# Patient Record
Sex: FEMALE | Race: WHITE | NOT HISPANIC OR LATINO | Employment: OTHER | URBAN - NONMETROPOLITAN AREA
[De-identification: names, ages, dates, MRNs, and addresses within clinical notes are randomized per-mention and may not be internally consistent; named-entity substitution may affect disease eponyms.]

---

## 2018-08-01 ENCOUNTER — TRANSFERRED RECORDS (OUTPATIENT)
Dept: HEALTH INFORMATION MANAGEMENT | Facility: OTHER | Age: 53
End: 2018-08-01

## 2019-03-18 ENCOUNTER — OFFICE VISIT (OUTPATIENT)
Dept: INTERNAL MEDICINE | Facility: OTHER | Age: 54
End: 2019-03-18
Attending: INTERNAL MEDICINE
Payer: COMMERCIAL

## 2019-03-18 VITALS
TEMPERATURE: 97.8 F | HEIGHT: 68 IN | WEIGHT: 167.2 LBS | HEART RATE: 84 BPM | DIASTOLIC BLOOD PRESSURE: 78 MMHG | SYSTOLIC BLOOD PRESSURE: 152 MMHG | BODY MASS INDEX: 25.34 KG/M2 | RESPIRATION RATE: 16 BRPM

## 2019-03-18 DIAGNOSIS — R03.0 BORDERLINE HYPERTENSION: ICD-10-CM

## 2019-03-18 DIAGNOSIS — Z91.030 H/O BEE STING ALLERGY: ICD-10-CM

## 2019-03-18 DIAGNOSIS — M15.0 PRIMARY OSTEOARTHRITIS INVOLVING MULTIPLE JOINTS: Primary | ICD-10-CM

## 2019-03-18 PROCEDURE — 99203 OFFICE O/P NEW LOW 30 MIN: CPT | Performed by: INTERNAL MEDICINE

## 2019-03-18 PROCEDURE — G0463 HOSPITAL OUTPT CLINIC VISIT: HCPCS

## 2019-03-18 RX ORDER — LORATADINE 10 MG/1
10 TABLET ORAL DAILY PRN
COMMUNITY
Start: 2019-03-18 | End: 2020-10-19

## 2019-03-18 RX ORDER — EPINEPHRINE 0.3 MG/.3ML
0.3 INJECTION SUBCUTANEOUS PRN
Qty: 1 ML | Refills: 3 | Status: SHIPPED | OUTPATIENT
Start: 2019-03-18

## 2019-03-18 SDOH — HEALTH STABILITY: MENTAL HEALTH: HOW MANY STANDARD DRINKS CONTAINING ALCOHOL DO YOU HAVE ON A TYPICAL DAY?: 1 OR 2

## 2019-03-18 SDOH — HEALTH STABILITY: MENTAL HEALTH: HOW OFTEN DO YOU HAVE A DRINK CONTAINING ALCOHOL?: 2-3 TIMES A WEEK

## 2019-03-18 ASSESSMENT — ENCOUNTER SYMPTOMS
WOUND: 0
WHEEZING: 0
SHORTNESS OF BREATH: 0
RHINORRHEA: 1
DIZZINESS: 0
BACK PAIN: 1
EYES NEGATIVE: 1
SINUS PRESSURE: 1
TREMORS: 0
BRUISES/BLEEDS EASILY: 0
HEMATOLOGIC/LYMPHATIC NEGATIVE: 1
HEADACHES: 0
FACIAL ASYMMETRY: 0
COLOR CHANGE: 0
ARTHRALGIAS: 1
PALPITATIONS: 0
CHOKING: 0
JOINT SWELLING: 1
APNEA: 0
COUGH: 0
ADENOPATHY: 0
CHEST TIGHTNESS: 0

## 2019-03-18 ASSESSMENT — PATIENT HEALTH QUESTIONNAIRE - PHQ9: SUM OF ALL RESPONSES TO PHQ QUESTIONS 1-9: 0

## 2019-03-18 ASSESSMENT — MIFFLIN-ST. JEOR: SCORE: 1411.91

## 2019-03-18 NOTE — NURSING NOTE
"The patient is here today to be seen to establish care.  Emilia Lisa LPN on 3/18/2019 at 3:11 PM  Chief Complaint   Patient presents with     Establish Care       Initial /78 (BP Location: Right arm, Patient Position: Sitting, Cuff Size: Adult Regular)   Pulse 84   Temp 97.8  F (36.6  C) (Tympanic)   Resp 16   Ht 1.727 m (5' 8\")   Wt 75.8 kg (167 lb 3.2 oz)   Breastfeeding? No   BMI 25.42 kg/m   Estimated body mass index is 25.42 kg/m  as calculated from the following:    Height as of this encounter: 1.727 m (5' 8\").    Weight as of this encounter: 75.8 kg (167 lb 3.2 oz).  Medication Reconciliation: complete    Emilia Lisa LPN    "

## 2019-03-18 NOTE — PROGRESS NOTES
Chief Complaint:  This patient is here to establish care.    HPI: She comes in today to establish care.  She is new to the area within the last 6 months.  She moved up here from the [place] area to live with her boyfriend.  They live on [facility].  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    None of her old records are here so we spent some time today getting everything up-to-date and reviewing them.  She has a history significant for osteoarthritis.  This is to the point of causing her to be on a disability and she is considering getting a Social Security disability determination.  She has a history of peptic ulcer disease so she cannot take anti-inflammatory medications.  She has tried glucosamine in the past without any success.  She has been on some antidepressants including amitriptyline in the past without improvement.  She is really interested more natural and physical therapy type treatment to try to improve upon her symptomatology.    1 of her biggest problems is with her right second finger.  Apparently was injured and splinted incorrectly.  It is now deformed and it causes her some discomfort.  She has seen a hand surgeon in the [place] area and apparently there is nothing more that can be done for this.    Medications are reconciled.  She needs a refill on her EpiPen.  Other than that she just takes Claritin seasonally.  She used to be on hypertensive medications but no longer is.  Past medical history, past surgical history, family history and social histories are all reviewed and updated.    Past Medical History:   Diagnosis Date     Borderline hypertension      H/O bee sting allergy      Osteoarthritis      PUD (peptic ulcer disease)      Seasonal allergies        History reviewed. No pertinent surgical history.    Current Outpatient Medications   Medication Sig Dispense Refill     EPINEPHrine (EPIPEN/ADRENACLICK/OR ANY BX GENERIC EQUIV) 0.3 MG/0.3ML  injection 2-pack Inject 0.3 mLs (0.3 mg) into the muscle as needed for anaphylaxis 1 mL 3     loratadine (CLARITIN) 10 MG tablet Take 1 tablet (10 mg) by mouth daily as needed for allergies         Allergies   Allergen Reactions     Bee Venom Swelling     Nsaids Other (See Comments)     Bleeding ulcers, tar stools       Family History   Problem Relation Age of Onset     Alzheimer Disease Mother      Other - See Comments Father         Old age 93     Alzheimer Disease Father      Pancreatic Cancer Sister      Other - See Comments Brother         Heroin OD       Social History     Socioeconomic History     Marital status: Single     Spouse name: Not on file     Number of children: Not on file     Years of education: Not on file     Highest education level: Not on file   Occupational History     Not on file   Social Needs     Financial resource strain: Not on file     Food insecurity:     Worry: Not on file     Inability: Not on file     Transportation needs:     Medical: Not on file     Non-medical: Not on file   Tobacco Use     Smoking status: Former Smoker     Packs/day: 0.50     Years: 20.00     Pack years: 10.00     Types: Cigarettes     Last attempt to quit: 3/18/2018     Years since quittin.0     Smokeless tobacco: Never Used   Substance and Sexual Activity     Alcohol use: Yes     Frequency: 2-3 times a week     Drinks per session: 1 or 2     Drug use: No     Sexual activity: Yes     Partners: Male   Lifestyle     Physical activity:     Days per week: Not on file     Minutes per session: Not on file     Stress: Not on file   Relationships     Social connections:     Talks on phone: Not on file     Gets together: Not on file     Attends Muslim service: Not on file     Active member of club or organization: Not on file     Attends meetings of clubs or organizations: Not on file     Relationship status: Not on file     Intimate partner violence:     Fear of current or ex partner: Not on file     Emotionally  abused: Not on file     Physically abused: Not on file     Forced sexual activity: Not on file   Other Topics Concern     Not on file   Social History Narrative    Single, but lives with SO on [facility].  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       Review of Systems   HENT: Positive for rhinorrhea, sinus pressure and sneezing.    Eyes: Negative.    Respiratory: Negative for apnea, cough, choking, chest tightness, shortness of breath and wheezing.    Cardiovascular: Negative for chest pain, palpitations and leg swelling.   Gastrointestinal:        History of irritable bowel   Musculoskeletal: Positive for arthralgias, back pain and joint swelling.   Skin: Negative for color change, pallor, rash and wound.   Allergic/Immunologic: Positive for environmental allergies.   Neurological: Negative for dizziness, tremors, facial asymmetry and headaches.   Hematological: Negative.  Negative for adenopathy. Does not bruise/bleed easily.       Physical Exam   Constitutional: She is oriented to person, place, and time. She appears well-developed and well-nourished. No distress.   HENT:   Head: Normocephalic.   Eyes: Conjunctivae are normal. Pupils are equal, round, and reactive to light.   Musculoskeletal:   Her hands are somewhat cool, question Raynaud's phenomena.  She does have a deformity of her right second finger with flexion.  She has some hypertrophic skin over her DIPs and PIPs.  No joint deformities otherwise seen.  No current synovitis present.   Neurological: She is alert and oriented to person, place, and time. No cranial nerve deficit. Coordination normal.   Skin: Skin is warm and dry. No rash noted. She is not diaphoretic. No erythema. No pallor.   Psychiatric: She has a normal mood and affect. Her behavior is normal.   Nursing note and vitals reviewed.      Assessment:      ICD-10-CM    1. Primary osteoarthritis involving multiple joints M15.0 PHYSICAL THERAPY REFERRAL   2. H/O bee sting allergy  Z91.030 EPINEPHrine (EPIPEN/ADRENACLICK/OR ANY BX GENERIC EQUIV) 0.3 MG/0.3ML injection 2-pack   3. Borderline hypertension R03.0         Plan: This patient has significant osteoarthritis in her hands but also in other areas including her feet.  She is requesting physical therapy referral which definitely seems reasonable.  We will try to treat her nonpharmacologically.  A consultation with [facility] is requested.  Her old records are on their way so hopefully will be here within the next couple of weeks.  I will have this patient follow-up with me in 1 month to reassess how she did with the therapy and to start drilling down on what kind of diagnoses and treatments she has had in the past.  She will need a recheck on her blood pressure.  She is obviously going to need to be updated with health maintenance issues.  I will be interested to see whether any rheumatologic evaluation was done.  No medications were prescribed today, but her EpiPen was refilled.

## 2019-03-27 ENCOUNTER — TRANSFERRED RECORDS (OUTPATIENT)
Dept: HEALTH INFORMATION MANAGEMENT | Facility: OTHER | Age: 54
End: 2019-03-27

## 2019-11-08 ENCOUNTER — OFFICE VISIT (OUTPATIENT)
Dept: INTERNAL MEDICINE | Facility: OTHER | Age: 54
End: 2019-11-08
Attending: INTERNAL MEDICINE
Payer: COMMERCIAL

## 2019-11-08 ENCOUNTER — HOSPITAL ENCOUNTER (OUTPATIENT)
Dept: GENERAL RADIOLOGY | Facility: OTHER | Age: 54
Discharge: HOME | End: 2019-11-08
Attending: INTERNAL MEDICINE | Admitting: INTERNAL MEDICINE
Payer: COMMERCIAL

## 2019-11-08 VITALS
WEIGHT: 178.2 LBS | SYSTOLIC BLOOD PRESSURE: 134 MMHG | BODY MASS INDEX: 27.1 KG/M2 | DIASTOLIC BLOOD PRESSURE: 88 MMHG | HEART RATE: 88 BPM | RESPIRATION RATE: 18 BRPM | TEMPERATURE: 96.7 F

## 2019-11-08 DIAGNOSIS — Z00.00 HEALTH CARE MAINTENANCE: ICD-10-CM

## 2019-11-08 DIAGNOSIS — M19.90 INFLAMMATORY ARTHRITIS: ICD-10-CM

## 2019-11-08 DIAGNOSIS — M19.072 PRIMARY OSTEOARTHRITIS OF LEFT FOOT: ICD-10-CM

## 2019-11-08 DIAGNOSIS — R20.0 LEFT ARM NUMBNESS: Primary | ICD-10-CM

## 2019-11-08 DIAGNOSIS — Z12.31 VISIT FOR SCREENING MAMMOGRAM: ICD-10-CM

## 2019-11-08 LAB
ALBUMIN SERPL-MCNC: 5 G/DL (ref 3.5–5.7)
ALBUMIN UR-MCNC: NEGATIVE MG/DL
ALP SERPL-CCNC: 78 U/L (ref 34–104)
ALT SERPL W P-5'-P-CCNC: 17 U/L (ref 7–52)
ANION GAP SERPL CALCULATED.3IONS-SCNC: 9 MMOL/L (ref 3–14)
APPEARANCE UR: CLEAR
AST SERPL W P-5'-P-CCNC: 15 U/L (ref 13–39)
BACTERIA #/AREA URNS HPF: ABNORMAL /HPF
BILIRUB SERPL-MCNC: 0.8 MG/DL (ref 0.3–1)
BILIRUB UR QL STRIP: NEGATIVE
BUN SERPL-MCNC: 14 MG/DL (ref 7–25)
CALCIUM SERPL-MCNC: 10 MG/DL (ref 8.6–10.3)
CHLORIDE SERPL-SCNC: 100 MMOL/L (ref 98–107)
CHOLEST SERPL-MCNC: 222 MG/DL
CO2 SERPL-SCNC: 30 MMOL/L (ref 21–31)
COLOR UR AUTO: YELLOW
CREAT SERPL-MCNC: 0.81 MG/DL (ref 0.6–1.2)
CRP SERPL-MCNC: 0.2 MG/L
ERYTHROCYTE [DISTWIDTH] IN BLOOD BY AUTOMATED COUNT: 11.9 % (ref 10–15)
ERYTHROCYTE [SEDIMENTATION RATE] IN BLOOD BY WESTERGREN METHOD: 12 MM/H (ref 1–15)
GFR SERPL CREATININE-BSD FRML MDRD: 74 ML/MIN/{1.73_M2}
GLUCOSE SERPL-MCNC: 104 MG/DL (ref 70–105)
GLUCOSE UR STRIP-MCNC: NEGATIVE MG/DL
HCT VFR BLD AUTO: 45.7 % (ref 35–47)
HDLC SERPL-MCNC: 74 MG/DL (ref 23–92)
HGB BLD-MCNC: 15 G/DL (ref 11.7–15.7)
HGB UR QL STRIP: NEGATIVE
KETONES UR STRIP-MCNC: ABNORMAL MG/DL
LDLC SERPL CALC-MCNC: 130 MG/DL
LEUKOCYTE ESTERASE UR QL STRIP: ABNORMAL
MCH RBC QN AUTO: 30.5 PG (ref 26.5–33)
MCHC RBC AUTO-ENTMCNC: 32.8 G/DL (ref 31.5–36.5)
MCV RBC AUTO: 93 FL (ref 78–100)
NITRATE UR QL: NEGATIVE
NON-SQ EPI CELLS #/AREA URNS LPF: ABNORMAL /LPF
NONHDLC SERPL-MCNC: 148 MG/DL
PH UR STRIP: 5.5 PH (ref 5–9)
PLATELET # BLD AUTO: 352 10E9/L (ref 150–450)
POTASSIUM SERPL-SCNC: 4.3 MMOL/L (ref 3.5–5.1)
PROT SERPL-MCNC: 9.2 G/DL (ref 6.4–8.9)
RBC # BLD AUTO: 4.91 10E12/L (ref 3.8–5.2)
RBC #/AREA URNS AUTO: ABNORMAL /HPF
RHEUMATOID FACT SER NEPH-ACNC: <14 IU/ML (ref 0–20)
SODIUM SERPL-SCNC: 139 MMOL/L (ref 134–144)
SOURCE: ABNORMAL
SP GR UR STRIP: 1.02 (ref 1–1.03)
TRIGL SERPL-MCNC: 90 MG/DL
URATE SERPL-MCNC: 6.6 MG/DL (ref 4.4–7.6)
UROBILINOGEN UR STRIP-ACNC: 0.2 EU/DL (ref 0.2–1)
WBC # BLD AUTO: 11.7 10E9/L (ref 4–11)
WBC #/AREA URNS AUTO: ABNORMAL /HPF

## 2019-11-08 PROCEDURE — 80053 COMPREHEN METABOLIC PANEL: CPT | Mod: ZL | Performed by: INTERNAL MEDICINE

## 2019-11-08 PROCEDURE — 84550 ASSAY OF BLOOD/URIC ACID: CPT | Mod: ZL | Performed by: INTERNAL MEDICINE

## 2019-11-08 PROCEDURE — 86140 C-REACTIVE PROTEIN: CPT | Mod: ZL | Performed by: INTERNAL MEDICINE

## 2019-11-08 PROCEDURE — 86431 RHEUMATOID FACTOR QUANT: CPT | Mod: ZL | Performed by: INTERNAL MEDICINE

## 2019-11-08 PROCEDURE — 80061 LIPID PANEL: CPT | Mod: ZL | Performed by: INTERNAL MEDICINE

## 2019-11-08 PROCEDURE — 85027 COMPLETE CBC AUTOMATED: CPT | Mod: ZL | Performed by: INTERNAL MEDICINE

## 2019-11-08 PROCEDURE — 73630 X-RAY EXAM OF FOOT: CPT | Mod: LT

## 2019-11-08 PROCEDURE — 99214 OFFICE O/P EST MOD 30 MIN: CPT | Performed by: INTERNAL MEDICINE

## 2019-11-08 PROCEDURE — 85652 RBC SED RATE AUTOMATED: CPT | Mod: ZL | Performed by: INTERNAL MEDICINE

## 2019-11-08 PROCEDURE — G0463 HOSPITAL OUTPT CLINIC VISIT: HCPCS

## 2019-11-08 PROCEDURE — 86200 CCP ANTIBODY: CPT | Mod: ZL | Performed by: INTERNAL MEDICINE

## 2019-11-08 PROCEDURE — G0463 HOSPITAL OUTPT CLINIC VISIT: HCPCS | Mod: 25

## 2019-11-08 PROCEDURE — 36415 COLL VENOUS BLD VENIPUNCTURE: CPT | Mod: ZL | Performed by: INTERNAL MEDICINE

## 2019-11-08 PROCEDURE — 86618 LYME DISEASE ANTIBODY: CPT | Mod: ZL | Performed by: INTERNAL MEDICINE

## 2019-11-08 PROCEDURE — 81001 URINALYSIS AUTO W/SCOPE: CPT | Mod: ZL | Performed by: INTERNAL MEDICINE

## 2019-11-08 PROCEDURE — 86038 ANTINUCLEAR ANTIBODIES: CPT | Mod: ZL | Performed by: INTERNAL MEDICINE

## 2019-11-08 ASSESSMENT — ENCOUNTER SYMPTOMS
ALLERGIC/IMMUNOLOGIC NEGATIVE: 1
CONSTITUTIONAL NEGATIVE: 1
HEMATOLOGIC/LYMPHATIC NEGATIVE: 1
ENDOCRINE NEGATIVE: 1

## 2019-11-08 ASSESSMENT — PATIENT HEALTH QUESTIONNAIRE - PHQ9: SUM OF ALL RESPONSES TO PHQ QUESTIONS 1-9: 0

## 2019-11-08 NOTE — NURSING NOTE
"The patient is here today to have a follow up visit about recent vocational rehab.,  Emilia Lisa LPN on 11/8/2019 at 1:37 PM  Chief Complaint   Patient presents with     RECHECK       Initial There were no vitals taken for this visit. Estimated body mass index is 25.42 kg/m  as calculated from the following:    Height as of 3/18/19: 1.727 m (5' 8\").    Weight as of 3/18/19: 75.8 kg (167 lb 3.2 oz).  Medication Reconciliation: complete    Emilia Lisa LPN    "

## 2019-11-08 NOTE — PROGRESS NOTES
Chief Complaint:  Multiple concerns.    HPI: She is in today for a follow-up.  The only time that I saw her was approximately 7 months ago.  She has a lot of problems with arthritis.  She is talking about getting an SSI disability determination and evaluation.  We talked about that for some time today.  I recommend that she contact the SSI division so that she could be evaluated by an independent examiner to see whether or not she would qualify.    She has arthritis in various areas including her hands and her feet.  Her feet especially bother her.  She would like to have this checked.  We had also talked about doing lab work on her to evaluate for her arthritis to make sure she did not have a rheumatologic or infectious process.  She is fasting today and would like to have that done.    She has numbness of her left arm.  This has been bothering her for some time.  She has a little bit of weakness with it as well.  She is right-handed.  She would like to have this investigated further.    Past Medical History:   Diagnosis Date     Borderline hypertension      H/O bee sting allergy      Osteoarthritis      PUD (peptic ulcer disease)      Seasonal allergies        History reviewed. No pertinent surgical history.    Allergies   Allergen Reactions     Bee Venom Swelling     Nsaids Other (See Comments)     Bleeding ulcers, tar stools       Current Outpatient Medications   Medication Sig Dispense Refill     EPINEPHrine (EPIPEN/ADRENACLICK/OR ANY BX GENERIC EQUIV) 0.3 MG/0.3ML injection 2-pack Inject 0.3 mLs (0.3 mg) into the muscle as needed for anaphylaxis 1 mL 3     loratadine (CLARITIN) 10 MG tablet Take 1 tablet (10 mg) by mouth daily as needed for allergies         Social History     Socioeconomic History     Marital status: Single     Spouse name: Not on file     Number of children: Not on file     Years of education: Not on file     Highest education level: Not on file   Occupational History     Not on file    Social Needs     Financial resource strain: Not on file     Food insecurity:     Worry: Not on file     Inability: Not on file     Transportation needs:     Medical: Not on file     Non-medical: Not on file   Tobacco Use     Smoking status: Current Every Day Smoker     Packs/day: 0.50     Years: 20.00     Pack years: 10.00     Types: Cigarettes     Last attempt to quit: 3/18/2018     Years since quittin.6     Smokeless tobacco: Never Used   Substance and Sexual Activity     Alcohol use: Yes     Frequency: 2-3 times a week     Drinks per session: 1 or 2     Drug use: No     Sexual activity: Yes     Partners: Male   Lifestyle     Physical activity:     Days per week: Not on file     Minutes per session: Not on file     Stress: Not on file   Relationships     Social connections:     Talks on phone: Not on file     Gets together: Not on file     Attends Zoroastrianism service: Not on file     Active member of club or organization: Not on file     Attends meetings of clubs or organizations: Not on file     Relationship status: Not on file     Intimate partner violence:     Fear of current or ex partner: Not on file     Emotionally abused: Not on file     Physically abused: Not on file     Forced sexual activity: Not on file   Other Topics Concern     Not on file   Social History Narrative    Single, but lives with SO on [facility].  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       Review of Systems   Constitutional: Negative.    Endocrine: Negative.    Skin: Negative.    Allergic/Immunologic: Negative.    Hematological: Negative.        Physical Exam  Vitals signs and nursing note reviewed.   Constitutional:       General: She is not in acute distress.     Appearance: Normal appearance. She is not ill-appearing, toxic-appearing or diaphoretic.   Musculoskeletal:      Comments: She has what appears to be OA changes of her hands and her feet on exam today.  No definite synovitis or effusions are present.    Neurological:      Mental Status: She is alert.         Assessment:      ICD-10-CM    1. Left arm numbness R20.0 NEUROLOGY ADULT REFERRAL   2. Primary osteoarthritis of left foot M19.072 XR Foot Left G/E 3 Views   3. Inflammatory arthritis M19.90 Comprehensive Metabolic Panel     CBC W PLT No Diff     *UA reflex to Microscopic     Sedimentation Rate (ESR)     CRP inflammation     Uric acid     Rheumatoid factor     Cyclic Citrullinated Peptide Antibody IgG     Anti Nuclear Airam IgG by IFA with Reflex     Lyme Disease Ab with reflex to WB Serum   4. Health care maintenance Z00.00 Lipid Panel   5. Visit for screening mammogram Z12.31 MA Screen Bilateral w/Hayden       Plan: Her x-ray today showed osteoarthritis of the first MTP joint.  I expect that all of her symptoms are related to osteoarthritis rather than inflammatory arthritis but complete work-up is definitely warranted.  She will have complete blood and urine test today and I will send her a letter with the results.  No change in medications.  EMG of the left arm scheduled, I will let her know the results.  She will contact [facility] for disability assessment.  I talked to her about health maintenance things, Cologuard is requested and mammogram is scheduled.  Follow-up will be dependent on her results and her progress.

## 2019-11-08 NOTE — LETTER
November 12, 2019      Lilian Craft  [address] [address]        Dear Lilian Craft,    Below are the results of your recent labs:    Results for orders placed or performed in visit on 11/08/19   *UA reflex to Microscopic     Status: Abnormal   Result Value Ref Range    Color Urine Yellow     Appearance Urine Clear     Glucose Urine Negative NEG^Negative mg/dL    Bilirubin Urine Negative NEG^Negative    Ketones Urine Trace (A) NEG^Negative mg/dL    Specific Gravity Urine 1.020 1.000 - 1.030    Blood Urine Negative NEG^Negative    pH Urine 5.5 5.0 - 9.0 pH    Protein Albumin Urine Negative NEG^Negative mg/dL    Urobilinogen Urine 0.2 0.2 - 1.0 EU/dL    Nitrite Urine Negative NEG^Negative    Leukocyte Esterase Urine Small (A) NEG^Negative    Source Midstream Urine    Comprehensive Metabolic Panel     Status: Abnormal   Result Value Ref Range    Sodium 139 134 - 144 mmol/L    Potassium 4.3 3.5 - 5.1 mmol/L    Chloride 100 98 - 107 mmol/L    Carbon Dioxide 30 21 - 31 mmol/L    Anion Gap 9 3 - 14 mmol/L    Glucose 104 70 - 105 mg/dL    Urea Nitrogen 14 7 - 25 mg/dL    Creatinine 0.81 0.60 - 1.20 mg/dL    GFR Estimate 74 >60 mL/min/[1.73_m2]    GFR Estimate If Black 89 >60 mL/min/[1.73_m2]    Calcium 10.0 8.6 - 10.3 mg/dL    Bilirubin Total 0.8 0.3 - 1.0 mg/dL    Albumin 5.0 3.5 - 5.7 g/dL    Protein Total 9.2 (H) 6.4 - 8.9 g/dL    Alkaline Phosphatase 78 34 - 104 U/L    ALT 17 7 - 52 U/L    AST 15 13 - 39 U/L   Lipid Panel     Status: Abnormal   Result Value Ref Range    Cholesterol 222 (H) <200 mg/dL    Triglycerides 90 <150 mg/dL    HDL Cholesterol 74 23 - 92 mg/dL    LDL Cholesterol Calculated 130 (H) <100 mg/dL    Non HDL Cholesterol 148 (H) <130 mg/dL   CBC W PLT No Diff     Status: Abnormal   Result Value Ref Range    WBC 11.7 (H) 4.0 - 11.0 10e9/L    RBC Count 4.91 3.8 - 5.2 10e12/L    Hemoglobin 15.0 11.7 - 15.7 g/dL    Hematocrit 45.7 35.0 - 47.0 %    MCV 93 78 - 100 fl    MCH 30.5 26.5  - 33.0 pg    MCHC 32.8 31.5 - 36.5 g/dL    RDW 11.9 10.0 - 15.0 %    Platelet Count 352 150 - 450 10e9/L   Sedimentation Rate (ESR)     Status: None   Result Value Ref Range    Sed Rate 12 1 - 15 mm/h   CRP inflammation     Status: None   Result Value Ref Range    CRP Inflammation 0.2 <0.5 mg/L   Uric acid     Status: None   Result Value Ref Range    Uric Acid 6.6 4.4 - 7.6 mg/dL   Rheumatoid factor     Status: None   Result Value Ref Range    Rheumatoid Factor <14 <14 IU/mL   Cyclic Citrullinated Peptide Antibody IgG     Status: None   Result Value Ref Range    Cyclic Citrullinated Peptide Antibody, IgG 2 <7 U/mL   Anti Nuclear Airam IgG by IFA with Reflex     Status: None   Result Value Ref Range    OBINNA interpretation Negative NEG^Negative   Lyme Disease Ab with reflex to WB Serum     Status: None   Result Value Ref Range    Lyme Disease Antibodies Serum 0.13 0.00 - 0.89   Urine Microscopic     Status: Abnormal   Result Value Ref Range    WBC Urine 5-10 (A) OTO5^0 - 5 /HPF    RBC Urine O - 2 OTO2^O - 2 /HPF    Squamous Epithelial /LPF Urine Few FEW^Few /LPF    Bacteria Urine Few (A) NEG^Negative /HPF        Your blood tests are fairly normal.  A couple of things are labeled high or low.  We can review any abnormal findings in greater detail when you return for a follow-up visit.  Assuming all goes well, I would recommend that you plan to come back to see me sometime before the end of the year.    Sincerely,        Jason Koch MD  Internal Medicine  [facility]

## 2019-11-12 LAB
ANA SER QL IF: NEGATIVE
B BURGDOR IGG+IGM SER QL: 0.13 (ref 0–0.89)
CCP AB SER IA-ACNC: 2 U/ML

## 2020-03-09 ENCOUNTER — APPOINTMENT (OUTPATIENT)
Dept: GENERAL RADIOLOGY | Facility: OTHER | Age: 55
End: 2020-03-09
Attending: FAMILY MEDICINE
Payer: COMMERCIAL

## 2020-03-09 ENCOUNTER — HOSPITAL ENCOUNTER (EMERGENCY)
Facility: OTHER | Age: 55
Discharge: HOME | End: 2020-03-09
Attending: FAMILY MEDICINE | Admitting: FAMILY MEDICINE
Payer: COMMERCIAL

## 2020-03-09 VITALS
TEMPERATURE: 98.2 F | BODY MASS INDEX: 26.48 KG/M2 | RESPIRATION RATE: 18 BRPM | SYSTOLIC BLOOD PRESSURE: 153 MMHG | OXYGEN SATURATION: 98 % | DIASTOLIC BLOOD PRESSURE: 103 MMHG | HEIGHT: 70 IN | WEIGHT: 185 LBS | HEART RATE: 82 BPM

## 2020-03-09 DIAGNOSIS — I10 BENIGN ESSENTIAL HYPERTENSION: ICD-10-CM

## 2020-03-09 DIAGNOSIS — K21.00 GASTROESOPHAGEAL REFLUX DISEASE WITH ESOPHAGITIS: ICD-10-CM

## 2020-03-09 LAB
ALBUMIN SERPL-MCNC: 4.4 G/DL (ref 3.5–5.7)
ALBUMIN UR-MCNC: NEGATIVE MG/DL
ALP SERPL-CCNC: 58 U/L (ref 34–104)
ALT SERPL W P-5'-P-CCNC: 16 U/L (ref 7–52)
AMPHETAMINES UR QL SCN: NOT DETECTED
ANION GAP SERPL CALCULATED.3IONS-SCNC: 9 MMOL/L (ref 3–14)
APPEARANCE UR: CLEAR
AST SERPL W P-5'-P-CCNC: 14 U/L (ref 13–39)
BARBITURATES UR QL: NOT DETECTED
BASOPHILS # BLD AUTO: 0.1 10E9/L (ref 0–0.2)
BASOPHILS NFR BLD AUTO: 0.8 %
BENZODIAZ UR QL: NOT DETECTED
BILIRUB SERPL-MCNC: 0.6 MG/DL (ref 0.3–1)
BILIRUB UR QL STRIP: NEGATIVE
BUN SERPL-MCNC: 14 MG/DL (ref 7–25)
BUPRENORPHINE UR QL: NOT DETECTED NG/ML
CALCIUM SERPL-MCNC: 9.5 MG/DL (ref 8.6–10.3)
CANNABINOIDS UR QL: NOT DETECTED NG/ML
CHLORIDE SERPL-SCNC: 104 MMOL/L (ref 98–107)
CO2 SERPL-SCNC: 27 MMOL/L (ref 21–31)
COCAINE UR QL: NOT DETECTED
COLOR UR AUTO: YELLOW
CREAT SERPL-MCNC: 0.89 MG/DL (ref 0.6–1.2)
CRP SERPL-MCNC: 0.1 MG/L
D-METHAMPHET UR QL: NOT DETECTED NG/ML
DIFFERENTIAL METHOD BLD: NORMAL
EOSINOPHIL # BLD AUTO: 0 10E9/L (ref 0–0.7)
EOSINOPHIL NFR BLD AUTO: 0.1 %
ERYTHROCYTE [DISTWIDTH] IN BLOOD BY AUTOMATED COUNT: 11.5 % (ref 10–15)
GFR SERPL CREATININE-BSD FRML MDRD: 66 ML/MIN/{1.73_M2}
GLUCOSE SERPL-MCNC: 104 MG/DL (ref 70–105)
GLUCOSE UR STRIP-MCNC: NEGATIVE MG/DL
HCT VFR BLD AUTO: 40.3 % (ref 35–47)
HGB BLD-MCNC: 13.4 G/DL (ref 11.7–15.7)
HGB UR QL STRIP: NEGATIVE
IMM GRANULOCYTES # BLD: 0 10E9/L (ref 0–0.4)
IMM GRANULOCYTES NFR BLD: 0.3 %
KETONES UR STRIP-MCNC: NEGATIVE MG/DL
LEUKOCYTE ESTERASE UR QL STRIP: NEGATIVE
LIPASE SERPL-CCNC: 15 U/L (ref 11–82)
LYMPHOCYTES # BLD AUTO: 2.7 10E9/L (ref 0.8–5.3)
LYMPHOCYTES NFR BLD AUTO: 34.7 %
MAGNESIUM SERPL-MCNC: 1.9 MG/DL (ref 1.9–2.7)
MCH RBC QN AUTO: 30.9 PG (ref 26.5–33)
MCHC RBC AUTO-ENTMCNC: 33.3 G/DL (ref 31.5–36.5)
MCV RBC AUTO: 93 FL (ref 78–100)
METHADONE UR QL SCN: NOT DETECTED
MONOCYTES # BLD AUTO: 0.5 10E9/L (ref 0–1.3)
MONOCYTES NFR BLD AUTO: 5.9 %
NEUTROPHILS # BLD AUTO: 4.5 10E9/L (ref 1.6–8.3)
NEUTROPHILS NFR BLD AUTO: 58.2 %
NITRATE UR QL: NEGATIVE
NT-PROBNP SERPL-MCNC: 50 PG/ML (ref 0–100)
OPIATES UR QL SCN: NOT DETECTED
OXYCODONE UR QL: NOT DETECTED NG/ML
PCP UR QL SCN: NOT DETECTED
PH UR STRIP: 7 PH (ref 5–7)
PLATELET # BLD AUTO: 337 10E9/L (ref 150–450)
POTASSIUM SERPL-SCNC: 4 MMOL/L (ref 3.5–5.1)
PROPOXYPH UR QL: NOT DETECTED NG/ML
PROT SERPL-MCNC: 7.2 G/DL (ref 6.4–8.9)
RBC # BLD AUTO: 4.34 10E12/L (ref 3.8–5.2)
SODIUM SERPL-SCNC: 140 MMOL/L (ref 134–144)
SOURCE: NORMAL
SP GR UR STRIP: 1.01 (ref 1–1.03)
TRICYCLICS UR QL SCN: NOT DETECTED NG/ML
TROPONIN I SERPL-MCNC: <2.3 PG/ML
TROPONIN I SERPL-MCNC: <2.3 PG/ML
UROBILINOGEN UR STRIP-MCNC: NORMAL MG/DL (ref 0–2)
WBC # BLD AUTO: 7.8 10E9/L (ref 4–11)

## 2020-03-09 PROCEDURE — 25000125 ZZHC RX 250: Performed by: FAMILY MEDICINE

## 2020-03-09 PROCEDURE — 71045 X-RAY EXAM CHEST 1 VIEW: CPT

## 2020-03-09 PROCEDURE — 96365 THER/PROPH/DIAG IV INF INIT: CPT | Performed by: FAMILY MEDICINE

## 2020-03-09 PROCEDURE — 86140 C-REACTIVE PROTEIN: CPT | Performed by: FAMILY MEDICINE

## 2020-03-09 PROCEDURE — 83880 ASSAY OF NATRIURETIC PEPTIDE: CPT | Performed by: FAMILY MEDICINE

## 2020-03-09 PROCEDURE — 80053 COMPREHEN METABOLIC PANEL: CPT | Performed by: FAMILY MEDICINE

## 2020-03-09 PROCEDURE — 99285 EMERGENCY DEPT VISIT HI MDM: CPT | Mod: 25 | Performed by: FAMILY MEDICINE

## 2020-03-09 PROCEDURE — 84484 ASSAY OF TROPONIN QUANT: CPT | Performed by: FAMILY MEDICINE

## 2020-03-09 PROCEDURE — 25000128 H RX IP 250 OP 636: Performed by: FAMILY MEDICINE

## 2020-03-09 PROCEDURE — 83735 ASSAY OF MAGNESIUM: CPT | Performed by: FAMILY MEDICINE

## 2020-03-09 PROCEDURE — 83690 ASSAY OF LIPASE: CPT | Performed by: FAMILY MEDICINE

## 2020-03-09 PROCEDURE — 80307 DRUG TEST PRSMV CHEM ANLYZR: CPT | Performed by: FAMILY MEDICINE

## 2020-03-09 PROCEDURE — 99284 EMERGENCY DEPT VISIT MOD MDM: CPT | Mod: Z6 | Performed by: FAMILY MEDICINE

## 2020-03-09 PROCEDURE — 96375 TX/PRO/DX INJ NEW DRUG ADDON: CPT | Performed by: FAMILY MEDICINE

## 2020-03-09 PROCEDURE — 93005 ELECTROCARDIOGRAM TRACING: CPT | Performed by: FAMILY MEDICINE

## 2020-03-09 PROCEDURE — 25800030 ZZH RX IP 258 OP 636: Performed by: FAMILY MEDICINE

## 2020-03-09 PROCEDURE — 93010 ELECTROCARDIOGRAM REPORT: CPT | Performed by: INTERNAL MEDICINE

## 2020-03-09 PROCEDURE — 25000132 ZZH RX MED GY IP 250 OP 250 PS 637: Performed by: FAMILY MEDICINE

## 2020-03-09 PROCEDURE — 85025 COMPLETE CBC W/AUTO DIFF WBC: CPT | Performed by: FAMILY MEDICINE

## 2020-03-09 PROCEDURE — 36415 COLL VENOUS BLD VENIPUNCTURE: CPT | Performed by: FAMILY MEDICINE

## 2020-03-09 PROCEDURE — 81003 URINALYSIS AUTO W/O SCOPE: CPT | Performed by: FAMILY MEDICINE

## 2020-03-09 PROCEDURE — 96366 THER/PROPH/DIAG IV INF ADDON: CPT | Performed by: FAMILY MEDICINE

## 2020-03-09 RX ORDER — LISINOPRIL 10 MG/1
10 TABLET ORAL DAILY
Qty: 30 TABLET | Refills: 0 | Status: SHIPPED | OUTPATIENT
Start: 2020-03-09 | End: 2020-10-19

## 2020-03-09 RX ORDER — ALUMINA, MAGNESIA, AND SIMETHICONE 2400; 2400; 240 MG/30ML; MG/30ML; MG/30ML
15 SUSPENSION ORAL ONCE
Status: COMPLETED | OUTPATIENT
Start: 2020-03-09 | End: 2020-03-09

## 2020-03-09 RX ORDER — LISINOPRIL 2.5 MG/1
10 TABLET ORAL DAILY
Qty: 30 TABLET | Refills: 0 | Status: SHIPPED | OUTPATIENT
Start: 2020-03-09 | End: 2020-03-09 | Stop reason: DRUGHIGH

## 2020-03-09 RX ORDER — LIDOCAINE HYDROCHLORIDE 20 MG/ML
15 SOLUTION OROPHARYNGEAL ONCE
Status: COMPLETED | OUTPATIENT
Start: 2020-03-09 | End: 2020-03-09

## 2020-03-09 RX ORDER — PANTOPRAZOLE SODIUM 40 MG/1
40 TABLET, DELAYED RELEASE ORAL DAILY
Qty: 30 TABLET | Refills: 0 | Status: SHIPPED | OUTPATIENT
Start: 2020-03-09 | End: 2020-04-08

## 2020-03-09 RX ORDER — LISINOPRIL 10 MG/1
10 TABLET ORAL ONCE
Status: COMPLETED | OUTPATIENT
Start: 2020-03-09 | End: 2020-03-09

## 2020-03-09 RX ORDER — SUCRALFATE 1 G/1
1 TABLET ORAL 4 TIMES DAILY
Qty: 120 TABLET | Refills: 0 | Status: SHIPPED | OUTPATIENT
Start: 2020-03-09 | End: 2020-10-19

## 2020-03-09 RX ORDER — CLONIDINE HYDROCHLORIDE 0.1 MG/1
0.1 TABLET ORAL ONCE
Status: COMPLETED | OUTPATIENT
Start: 2020-03-09 | End: 2020-03-09

## 2020-03-09 RX ORDER — SODIUM CHLORIDE 9 MG/ML
1000 INJECTION, SOLUTION INTRAVENOUS CONTINUOUS
Status: DISCONTINUED | OUTPATIENT
Start: 2020-03-09 | End: 2020-03-09 | Stop reason: HOSPADM

## 2020-03-09 RX ADMIN — SODIUM CHLORIDE 1000 ML: 9 INJECTION, SOLUTION INTRAVENOUS at 10:13

## 2020-03-09 RX ADMIN — LIDOCAINE HYDROCHLORIDE 15 ML: 20 SOLUTION ORAL; TOPICAL at 11:59

## 2020-03-09 RX ADMIN — PROCHLORPERAZINE EDISYLATE 10 MG: 5 INJECTION, SOLUTION INTRAMUSCULAR; INTRAVENOUS at 10:53

## 2020-03-09 RX ADMIN — CLONIDINE HYDROCHLORIDE 0.1 MG: 0.1 TABLET ORAL at 10:12

## 2020-03-09 RX ADMIN — FAMOTIDINE 20 MG: 20 INJECTION, SOLUTION INTRAVENOUS at 10:13

## 2020-03-09 RX ADMIN — LISINOPRIL 10 MG: 10 TABLET ORAL at 12:18

## 2020-03-09 RX ADMIN — ALUMINUM HYDROXIDE, MAGNESIUM HYDROXIDE, AND DIMETHICONE 15 ML: 400; 400; 40 SUSPENSION ORAL at 11:59

## 2020-03-09 ASSESSMENT — ENCOUNTER SYMPTOMS
FATIGUE: 0
APPETITE CHANGE: 1
FEVER: 0
SHORTNESS OF BREATH: 0
LIGHT-HEADEDNESS: 1
WHEEZING: 0
STRIDOR: 0
NAUSEA: 1
HEMATOLOGIC/LYMPHATIC NEGATIVE: 1
WEAKNESS: 0
APNEA: 0
ACTIVITY CHANGE: 0
BLOOD IN STOOL: 0
CHEST TIGHTNESS: 0
CHILLS: 0
CONSTIPATION: 0
COUGH: 0
DIARRHEA: 0
ABDOMINAL PAIN: 0
BACK PAIN: 0
PSYCHIATRIC NEGATIVE: 1
UNEXPECTED WEIGHT CHANGE: 0
DIZZINESS: 1
DIAPHORESIS: 0
VOMITING: 0
CHOKING: 0
PALPITATIONS: 0

## 2020-03-09 ASSESSMENT — MIFFLIN-ST. JEOR: SCORE: 1511.46

## 2020-03-09 NOTE — ED TRIAGE NOTES
Pt states that her BP was high when she took it last night.  Pt also states that she has had acid reflux for the past month.  Pt states she came in today because she now feel nauseated as well.  Pt states that she had been taking omeprazole and Zantac along with Pepto.

## 2020-03-09 NOTE — ED AVS SNAPSHOT
[facility]  [address]  [place] [address]  Phone:  [phone]  Fax:  [phone]                                    Lilian Craft   MRN: [id number]    Department:  [facility]   Date of Visit:  3/9/2020           After Visit Summary Signature Page    I have received my discharge instructions, and my questions have been answered. I have discussed any challenges I see with this plan with the nurse or doctor.    ..........................................................................................................................................  Patient/Patient Representative Signature      ..........................................................................................................................................  Patient Representative Print Name and Relationship to Patient    ..................................................               ................................................  Date                                   Time    ..........................................................................................................................................  Reviewed by Signature/Title    ...................................................              ..............................................  Date                                               Time          22EPIC Rev 08/18

## 2020-03-09 NOTE — PROGRESS NOTES
Patient states that 1 month ago, she had 1 black tarry stool.  Just completed a 14 day course of Omeprazole that ended yesterday.  Took 2 doses of Pepto this morning as well as 1 ranitidine last night.    Kasie Molina RN on 3/9/2020 at 9:59 AM

## 2020-03-09 NOTE — DISCHARGE INSTRUCTIONS
Clinic should contact you regarding your surgery appointment   I did try to schedule it as the procedure however they may make you consult first

## 2020-03-09 NOTE — PROGRESS NOTES
Patient states that her nausea has resolved.  Her gastric pain level is now down to a 2 since the GI cocktail was administered.    Kasie Molina RN on 3/9/2020 at 12:18 PM

## 2020-03-09 NOTE — ED PROVIDER NOTES
History     Chief Complaint   Patient presents with     Hypertension     Gastrophageal Reflux     HPI  Lilian Craft is a 54 year old female who presents with concern of  Heartburn for basically whole life but increased over the last 1 month. States that she completed omeprazole day before presentation , Took ranitidine last night. States has history of ulcers but states has never had EGD. States thought she had a black stool 1 month ago but has not recurred .  Patient has known IBS . Does see DR Koch and has seen twice. Burning , stabbing chest pain , quite severe at 830 , pain scale 8 this AM , now at 4. NO history of known CAD . No history of high cholesterol or diabetes . NO sob   Nausea . NO vomitting. No recent cough , cold or flu  Like symptoms    .    Allergies:  Allergies   Allergen Reactions     Bee Venom Swelling     Nsaids Other (See Comments)     Bleeding ulcers, tar stools       Problem List:    Patient Active Problem List    Diagnosis Date Noted     Primary osteoarthritis involving multiple joints 03/18/2019     Priority: Medium     H/O bee sting allergy      Priority: Medium     Borderline hypertension      Priority: Medium        Past Medical History:    Past Medical History:   Diagnosis Date     Borderline hypertension      H/O bee sting allergy      Osteoarthritis      PUD (peptic ulcer disease)      Seasonal allergies        Past Surgical History:    History reviewed. No pertinent surgical history.    Family History:    Family History   Problem Relation Age of Onset     Alzheimer Disease Mother      Other - See Comments Father         Old age 93     Alzheimer Disease Father      Pancreatic Cancer Sister      Other - See Comments Brother         Heroin OD       Social History:  Marital Status:  Single [1]  Social History     Tobacco Use     Smoking status: Current Every Day Smoker     Packs/day: 0.50     Years: 20.00     Pack years: 10.00     Types: Cigarettes     Last attempt to quit:  "3/18/2018     Years since quittin.9     Smokeless tobacco: Never Used   Substance Use Topics     Alcohol use: Yes     Frequency: 2-3 times a week     Drinks per session: 1 or 2     Drug use: No        Medications:    loratadine (CLARITIN) 10 MG tablet  EPINEPHrine (EPIPEN/ADRENACLICK/OR ANY BX GENERIC EQUIV) 0.3 MG/0.3ML injection 2-pack          Review of Systems   Constitutional: Positive for appetite change. Negative for activity change, chills, diaphoresis, fatigue, fever and unexpected weight change.   HENT: Negative.    Respiratory: Negative for apnea, cough, choking, chest tightness, shortness of breath, wheezing and stridor.    Cardiovascular: Positive for chest pain. Negative for palpitations and leg swelling.   Gastrointestinal: Positive for nausea. Negative for abdominal pain, blood in stool, constipation, diarrhea and vomiting.   Genitourinary: Negative.    Musculoskeletal: Negative for back pain.   Skin: Negative.    Neurological: Positive for dizziness and light-headedness. Negative for syncope and weakness.   Hematological: Negative.    Psychiatric/Behavioral: Negative.        Physical Exam   BP: (!) 215/118  Pulse: 88  Heart Rate: 95  Temp: 98.2  F (36.8  C)  Resp: 18  Height: 176.5 cm (5' 9.5\")  Weight: 83.9 kg (185 lb)  SpO2: 100 %      Physical Exam  Vitals signs and nursing note reviewed.   Constitutional:       Appearance: Normal appearance.   HENT:      Head: Normocephalic and atraumatic.      Right Ear: Tympanic membrane normal.      Left Ear: Tympanic membrane normal.      Nose: Nose normal.      Mouth/Throat:      Mouth: Mucous membranes are moist.   Eyes:      Pupils: Pupils are equal, round, and reactive to light.   Neck:      Musculoskeletal: Normal range of motion and neck supple. No muscular tenderness.   Cardiovascular:      Rate and Rhythm: Normal rate and regular rhythm.      Pulses: Normal pulses.   Pulmonary:      Effort: Pulmonary effort is normal.      Breath sounds: Normal " breath sounds.   Abdominal:      General: There is no distension.      Comments: Mid epigastric tenderness to palpation without guarding or rebound    Musculoskeletal: Normal range of motion.   Lymphadenopathy:      Cervical: No cervical adenopathy.   Skin:     General: Skin is warm.   Neurological:      Mental Status: She is alert.         ED Course        Procedures          Patient presents to ER for evaluation of 2 week history of increasing heartburn and hypertension . Patient triaged to exam room . Vital signs significant for marked elevation  Of blood pressure upon arrival at 215/118. Patient describing heartburn which is longstanding but no other symptoms suggestive of hypertensive emergency including sob, severe  Headache, focal neurologic symptoms. Cardiac monitors placed EKG obtained EKG NSR NO acute ST /T changes apparent . Labs and diagnostics ordered. Symptoms most suggestive of heartburn as patient suspected. Pepcid 20 mg, compazine given. Clonidine 0.1 mg given for hypertension . Following clonidine, pepcid patient with improved symptom score of 4-2. GI cocktail ordered with almost complete symptoms resolution . Blood pressures improved significantly after single dose of clonidine . Patient states had been on lisinopril for hypertension but discontinued so will restart . Lisinopril 10 mg given prior to departure. Labs and diagnostics including serial troponin times 2 allreassuring. Discussed reassuring results with patient. Given patient blood pressure significantly improved and patient now symptomatically better will discontinue with prescription for lisinopril 10 mg daily with recommendation to follow up with primary care in next 1 week. Will start protonix , carafate . Patient will  Be referred for outpatient EGD and colonoscopy   Results for orders placed or performed during the hospital encounter of 03/09/20   XR Chest Port 1 View     Status: None    Narrative    PROCEDURE:  XR CHEST PORT 1  VW    HISTORY: chest pain. .    COMPARISON:  None.    FINDINGS:    The cardiomediastinal contours are within normal limits for portable  technique.  No focal consolidation, effusion or pneumothorax.      Impression    IMPRESSION:  Negative portable chest.      ANIBAL MACHADO MD   CBC with platelets differential     Status: None   Result Value Ref Range    WBC 7.8 4.0 - 11.0 10e9/L    RBC Count 4.34 3.8 - 5.2 10e12/L    Hemoglobin 13.4 11.7 - 15.7 g/dL    Hematocrit 40.3 35.0 - 47.0 %    MCV 93 78 - 100 fl    MCH 30.9 26.5 - 33.0 pg    MCHC 33.3 31.5 - 36.5 g/dL    RDW 11.5 10.0 - 15.0 %    Platelet Count 337 150 - 450 10e9/L    Diff Method Automated Method     % Neutrophils 58.2 %    % Lymphocytes 34.7 %    % Monocytes 5.9 %    % Eosinophils 0.1 %    % Basophils 0.8 %    % Immature Granulocytes 0.3 %    Absolute Neutrophil 4.5 1.6 - 8.3 10e9/L    Absolute Lymphocytes 2.7 0.8 - 5.3 10e9/L    Absolute Monocytes 0.5 0.0 - 1.3 10e9/L    Absolute Eosinophils 0.0 0.0 - 0.7 10e9/L    Absolute Basophils 0.1 0.0 - 0.2 10e9/L    Abs Immature Granulocytes 0.0 0 - 0.4 10e9/L   Comprehensive metabolic panel     Status: None   Result Value Ref Range    Sodium 140 134 - 144 mmol/L    Potassium 4.0 3.5 - 5.1 mmol/L    Chloride 104 98 - 107 mmol/L    Carbon Dioxide 27 21 - 31 mmol/L    Anion Gap 9 3 - 14 mmol/L    Glucose 104 70 - 105 mg/dL    Urea Nitrogen 14 7 - 25 mg/dL    Creatinine 0.89 0.60 - 1.20 mg/dL    GFR Estimate 66 >60 mL/min/[1.73_m2]    GFR Estimate If Black 80 >60 mL/min/[1.73_m2]    Calcium 9.5 8.6 - 10.3 mg/dL    Bilirubin Total 0.6 0.3 - 1.0 mg/dL    Albumin 4.4 3.5 - 5.7 g/dL    Protein Total 7.2 6.4 - 8.9 g/dL    Alkaline Phosphatase 58 34 - 104 U/L    ALT 16 7 - 52 U/L    AST 14 13 - 39 U/L   Lipase     Status: None   Result Value Ref Range    Lipase 15 11 - 82 U/L   Troponin GH     Status: None   Result Value Ref Range    Troponin <2.3 <34.0 pg/mL   Nt probnp inpatient (BNP)     Status: None   Result  Value Ref Range    N-Terminal Pro BNP Inpatient 50 0 - 100 pg/mL   CRP inflammation     Status: None   Result Value Ref Range    CRP Inflammation 0.1 <0.5 mg/L   UA reflex to Microscopic and Culture     Status: None    Specimen: Urine Midstream; Midstream Urine   Result Value Ref Range    Color Urine Yellow     Appearance Urine Clear     Glucose Urine Negative NEG^Negative mg/dL    Bilirubin Urine Negative NEG^Negative    Ketones Urine Negative NEG^Negative mg/dL    Specific Gravity Urine 1.006 1.003 - 1.035    Blood Urine Negative NEG^Negative    pH Urine 7.0 5.0 - 7.0 pH    Protein Albumin Urine Negative NEG^Negative mg/dL    Urobilinogen mg/dL Normal 0.0 - 2.0 mg/dL    Nitrite Urine Negative NEG^Negative    Leukocyte Esterase Urine Negative NEG^Negative    Source Midstream Urine    Drug of Abuse Screen Urine GH     Status: None   Result Value Ref Range    Amphetamine Qual Urine Not Detected NDET^Not Detected    Benzodiazepine Qual Urine Not Detected NDET^Not Detected    Cocaine Qual Urine Not Detected NDET^Not Detected    Methadone Qual Urine Not Detected NDET^Not Detected    PCP Qual Urine Not Detected NDET^Not Detected    Opiates Qualitative Urine Not Detected NDET^Not Detected    Oxycodone Qualitative Urine Not Detected NDET^Not Detected ng/mL    Propoxyphene Qualitative Urine Not Detected NDET^Not Detected ng/mL    Tricyclic Antidepressants Qual Urine Not Detected NDET^Not Detected ng/mL    Methamphetamine Qualitative Urine Not Detected NDET^Not Detected ng/mL    Barbiturates Qual Urine Not Detected NDET^Not Detected    Cannabinoids Qualitative Urine Not Detected NDET^Not Detected ng/mL    Buprenorphine Qualitative Urine Not Detected NDET^Not Detected ng/mL   Magnesium     Status: None   Result Value Ref Range    Magnesium 1.9 1.9 - 2.7 mg/dL   Troponin GH     Status: None   Result Value Ref Range    Troponin <2.3 <34.0 pg/mL           Results for orders placed or performed during the hospital encounter of  03/09/20 (from the past 24 hour(s))   CBC with platelets differential   Result Value Ref Range    WBC 7.8 4.0 - 11.0 10e9/L    RBC Count 4.34 3.8 - 5.2 10e12/L    Hemoglobin 13.4 11.7 - 15.7 g/dL    Hematocrit 40.3 35.0 - 47.0 %    MCV 93 78 - 100 fl    MCH 30.9 26.5 - 33.0 pg    MCHC 33.3 31.5 - 36.5 g/dL    RDW 11.5 10.0 - 15.0 %    Platelet Count 337 150 - 450 10e9/L    Diff Method Automated Method     % Neutrophils 58.2 %    % Lymphocytes 34.7 %    % Monocytes 5.9 %    % Eosinophils 0.1 %    % Basophils 0.8 %    % Immature Granulocytes 0.3 %    Absolute Neutrophil 4.5 1.6 - 8.3 10e9/L    Absolute Lymphocytes 2.7 0.8 - 5.3 10e9/L    Absolute Monocytes 0.5 0.0 - 1.3 10e9/L    Absolute Eosinophils 0.0 0.0 - 0.7 10e9/L    Absolute Basophils 0.1 0.0 - 0.2 10e9/L    Abs Immature Granulocytes 0.0 0 - 0.4 10e9/L   Troponin GH   Result Value Ref Range    Troponin <2.3 <34.0 pg/mL   Nt probnp inpatient (BNP)   Result Value Ref Range    N-Terminal Pro BNP Inpatient 50 0 - 100 pg/mL       Medications   0.9% sodium chloride BOLUS (1,000 mLs Intravenous New Bag 3/9/20 1013)     Followed by   sodium chloride 0.9% infusion (has no administration in time range)   famotidine (PEPCID) infusion 20 mg (20 mg Intravenous New Bag 3/9/20 1013)   prochlorperazine (COMPAZINE) injection 10 mg (has no administration in time range)   cloNIDine (CATAPRES) tablet 0.1 mg (0.1 mg Oral Given 3/9/20 1012)       Assessments & Plan (with Medical Decision Making)     I have reviewed the nursing notes.    I have reviewed the findings, diagnosis, plan and need for follow up with the patient.      New Prescriptions    No medications on file       Final diagnoses:   Benign essential hypertension   Gastroesophageal reflux disease with esophagitis       3/9/2020   [facility]     Zahira Muniz MD  03/09/20 [id number]

## 2020-03-10 LAB — INTERPRETATION ECG - MUSE: NORMAL

## 2020-10-19 ENCOUNTER — OFFICE VISIT (OUTPATIENT)
Dept: INTERNAL MEDICINE | Facility: OTHER | Age: 55
End: 2020-10-19
Attending: INTERNAL MEDICINE
Payer: COMMERCIAL

## 2020-10-19 VITALS
HEART RATE: 88 BPM | RESPIRATION RATE: 16 BRPM | SYSTOLIC BLOOD PRESSURE: 148 MMHG | TEMPERATURE: 97.9 F | WEIGHT: 196 LBS | BODY MASS INDEX: 28.53 KG/M2 | DIASTOLIC BLOOD PRESSURE: 100 MMHG

## 2020-10-19 DIAGNOSIS — M15.0 PRIMARY OSTEOARTHRITIS INVOLVING MULTIPLE JOINTS: Primary | ICD-10-CM

## 2020-10-19 PROCEDURE — 99214 OFFICE O/P EST MOD 30 MIN: CPT | Performed by: INTERNAL MEDICINE

## 2020-10-19 PROCEDURE — G0463 HOSPITAL OUTPT CLINIC VISIT: HCPCS

## 2020-10-19 SDOH — HEALTH STABILITY: MENTAL HEALTH: HOW OFTEN DO YOU HAVE 6 OR MORE DRINKS ON ONE OCCASION?: NOT ASKED

## 2020-10-19 SDOH — HEALTH STABILITY: MENTAL HEALTH: HOW OFTEN DO YOU HAVE A DRINK CONTAINING ALCOHOL?: MONTHLY OR LESS

## 2020-10-19 ASSESSMENT — PAIN SCALES - GENERAL: PAINLEVEL: MILD PAIN (3)

## 2020-10-19 ASSESSMENT — ENCOUNTER SYMPTOMS
ALLERGIC/IMMUNOLOGIC NEGATIVE: 1
HEMATOLOGIC/LYMPHATIC NEGATIVE: 1
CONSTITUTIONAL NEGATIVE: 1
ENDOCRINE NEGATIVE: 1

## 2020-10-19 NOTE — PROGRESS NOTES
Chief Complaint: Disability discussion.    HPI: She is here today to talk about getting a disability through Social Security.  She has already applied for disability on one occasion and was denied.  She now has some attorneys in the [place] area and they are helping her to pursue an appeal for her disability.  She is going to need some further documentation and may need to have some ongoing treatment with physical therapy, etc.  She had a letter from her attorneys but she forgot to bring that in today and she does not remember exactly what is needed in order for her to proceed as they had requested.    Her main areas of disability include arthritis.  This includes her neck, her low back, her knees and her feet.  I do not have any recent documentation or radiographs regarding her issues.  She tells me that she has had scans in the past that confirmed that she has bulging disks, etc.    Medications are reconciled.  Past medical history, past surgical history, family history and social histories are reviewed and updated.    Past Medical History:   Diagnosis Date     Borderline hypertension      H/O bee sting allergy      Osteoarthritis      PUD (peptic ulcer disease)      Seasonal allergies        History reviewed. No pertinent surgical history.    Allergies   Allergen Reactions     Bee Venom Swelling     Nsaids Other (See Comments)     Bleeding ulcers, tar stools       Current Outpatient Medications   Medication Sig Dispense Refill     EPINEPHrine (EPIPEN/ADRENACLICK/OR ANY BX GENERIC EQUIV) 0.3 MG/0.3ML injection 2-pack Inject 0.3 mLs (0.3 mg) into the muscle as needed for anaphylaxis 1 mL 3       Social History     Socioeconomic History     Marital status: Single     Spouse name: Not on file     Number of children: Not on file     Years of education: Not on file     Highest education level: Not on file   Occupational History     Not on file   Social Needs     Financial resource strain: Not on file     Food  insecurity     Worry: Not on file     Inability: Not on file     Transportation needs     Medical: Not on file     Non-medical: Not on file   Tobacco Use     Smoking status: Current Every Day Smoker     Packs/day: 0.50     Years: 20.00     Pack years: 10.00     Types: Cigarettes     Last attempt to quit: 3/18/2018     Years since quittin.5     Smokeless tobacco: Never Used   Substance and Sexual Activity     Alcohol use: Yes     Frequency: Monthly or less     Drinks per session: 1 or 2     Comment: 1-2 per year     Drug use: No     Sexual activity: Yes     Partners: Male   Lifestyle     Physical activity     Days per week: Not on file     Minutes per session: Not on file     Stress: Not on file   Relationships     Social connections     Talks on phone: Not on file     Gets together: Not on file     Attends [employer] service: Not on file     Active member of club or organization: Not on file     Attends meetings of clubs or organizations: Not on file     Relationship status: Not on file     Intimate partner violence     Fear of current or ex partner: Not on file     Emotionally abused: Not on file     Physically abused: Not on file     Forced sexual activity: Not on file   Other Topics Concern     Not on file   Social History Narrative    Single, but lives with SO on [address].  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       Review of Systems   Constitutional: Negative.    Endocrine: Negative.    Skin: Negative.    Allergic/Immunologic: Negative.    Hematological: Negative.        Physical Exam  Vitals signs and nursing note reviewed.   Constitutional:       General: She is not in acute distress.     Appearance: Normal appearance. She is not ill-appearing, toxic-appearing or diaphoretic.   Neurological:      Mental Status: She is alert.         Assessment:      ICD-10-CM    1. Primary osteoarthritis involving multiple joints  M89.49        Plan: This patient is seeking disability status because of  her osteoarthritis.  She is going to need some further testing and documentation to help with her case with Social Security.  Unfortunately, she did not bring her letter from her attorneys so she will drop that off in a couple of days and I will review this and get back to her.  Of note is that she is not really a candidate for anti-inflammatories as she does have a history of peptic ulcer disease.  She will continue with Tylenol in the interim.  Over 50% of a 25-minute visit spent in counseling and coordination of care.

## 2020-10-19 NOTE — NURSING NOTE
"Chief Complaint   Patient presents with     Consult     Discuss disability       Initial BP (!) 148/100 (BP Location: Right arm, Patient Position: Sitting, Cuff Size: Adult Regular)   Pulse 88   Temp 97.9  F (36.6  C) (Tympanic)   Resp 16   Wt 88.9 kg (196 lb)   BMI 28.53 kg/m   Estimated body mass index is 28.53 kg/m  as calculated from the following:    Height as of 3/9/20: 1.765 m (5' 9.5\").    Weight as of this encounter: 88.9 kg (196 lb).  Medication Reconciliation: complete    Alisson Galeas LPN  "

## 2020-10-30 ENCOUNTER — HOSPITAL ENCOUNTER (OUTPATIENT)
Dept: GENERAL RADIOLOGY | Facility: OTHER | Age: 55
End: 2020-10-30
Attending: FAMILY MEDICINE
Payer: COMMERCIAL

## 2020-10-30 ENCOUNTER — OFFICE VISIT (OUTPATIENT)
Dept: FAMILY MEDICINE | Facility: OTHER | Age: 55
End: 2020-10-30
Attending: FAMILY MEDICINE
Payer: COMMERCIAL

## 2020-10-30 VITALS
OXYGEN SATURATION: 99 % | TEMPERATURE: 97.8 F | BODY MASS INDEX: 30.51 KG/M2 | WEIGHT: 206 LBS | DIASTOLIC BLOOD PRESSURE: 90 MMHG | RESPIRATION RATE: 16 BRPM | HEART RATE: 98 BPM | SYSTOLIC BLOOD PRESSURE: 136 MMHG | HEIGHT: 69 IN

## 2020-10-30 DIAGNOSIS — R03.0 BORDERLINE HYPERTENSION: ICD-10-CM

## 2020-10-30 DIAGNOSIS — M17.11 PRIMARY OSTEOARTHRITIS OF RIGHT KNEE: ICD-10-CM

## 2020-10-30 DIAGNOSIS — M17.11 PRIMARY OSTEOARTHRITIS OF RIGHT KNEE: Primary | ICD-10-CM

## 2020-10-30 DIAGNOSIS — M21.612 BUNION, LEFT: ICD-10-CM

## 2020-10-30 DIAGNOSIS — M15.0 PRIMARY OSTEOARTHRITIS INVOLVING MULTIPLE JOINTS: ICD-10-CM

## 2020-10-30 DIAGNOSIS — G43.719 INTRACTABLE CHRONIC MIGRAINE WITHOUT AURA AND WITHOUT STATUS MIGRAINOSUS: ICD-10-CM

## 2020-10-30 PROCEDURE — 73562 X-RAY EXAM OF KNEE 3: CPT | Mod: RT

## 2020-10-30 PROCEDURE — G0463 HOSPITAL OUTPT CLINIC VISIT: HCPCS

## 2020-10-30 PROCEDURE — 99214 OFFICE O/P EST MOD 30 MIN: CPT | Performed by: FAMILY MEDICINE

## 2020-10-30 PROCEDURE — 73630 X-RAY EXAM OF FOOT: CPT

## 2020-10-30 RX ORDER — SUMATRIPTAN 50 MG/1
50 TABLET, FILM COATED ORAL
Qty: 20 TABLET | Refills: 0 | Status: SHIPPED | OUTPATIENT
Start: 2020-10-30 | End: 2020-11-05

## 2020-10-30 ASSESSMENT — MIFFLIN-ST. JEOR: SCORE: 1593.79

## 2020-10-30 ASSESSMENT — PAIN SCALES - GENERAL: PAINLEVEL: MILD PAIN (3)

## 2020-10-30 NOTE — PROGRESS NOTES
Nursing Notes:   Kimmy Martinez LPN  10/30/2020  1:25 PM  Sign at exiting of workspace  Chief Complaint   Patient presents with     Establish Care     Here today to establish care. Moved up here about 2-3 years ago from the [facility]. Complains of L arm tingliness, Right leg numbness, chronic headaches, applying for sliding scale insulin and would like to talk about Advanced Directives. Unsure about last pap or date of colonoscopy. Is due for Mammogram.     Medication Reconciliation: complete    Kimmy Martinez LPN       SUBJECTIVE:  HPI: Lilian Craft is a 55 year old female here for recheck of several chronic complaints. She transferred care from [facility], Dr. Olga Price 2 to 3 years ago.     # Right knee osteoarthritis: Pain in her right knee is rated 3 out of 10.  Located mostly in the joint, with some radiation into her patellar tendon.  She does have associated occasional numbness around the knee.  Numbness is worse with prolonged standing and walking, and numbness resolves with sitting down.  No x-rays on file.  Recent lab work-up of polyarthropathy, significant for normal OBINNA, RF, CRP, ESR, CBC, Lyme disease.  Patient has completed hot water physical therapy at [facility], (mostly for her herniated disc and low back pain, but was also helpful for her knee).  She denies any surgeries or prior steroid injections.  She would be interested in a steroid injection.  She does use Tylenol as needed with minimal relief.  She is not able to use NSAIDs due to recent peptic ulcer disease.  Denies leg weakness or falls.    # Left foot bunion and DJD: Pain in her foot is rated 6 out of 10 and described as constant.  Noted on prior foot x-ray from 11/2019.  Has not improved with conservative treatment.  No fevers or chills.  No history of gout. Tylenol use as needed with minimal pain relief.    # Headaches: Patient notes almost daily dull headaches, associated with photophobia and phonophobia.  She denies any nausea or  vomiting.  Tylenol has not been helpful.  She has not tried any migraine prophylaxis or treatments.    # Hypertension history: Patient has been on lisinopril 10 mg in the past, but is not taking currently.    # Peptic ulcer disease: Patient was seen in the emergency room 3-9-20 20, and the plan was to start protonix , carafate . Patient will  Be referred for outpatient EGD and colonoscopy.  We will address this at her next visit.    # Healthcare maintenance: Due for her mammogram.  Unknown timing of her last Pap smear.  Unknown timing of her last colonoscopy.  Though colonoscopy was recommended during her last ER visit for peptic ulcer disease.  We will plan to order that at her next visit.  Additionally we will get records from her last PCP.    Allergies:  Allergies   Allergen Reactions     Bee Venom Swelling     Nsaids Other (See Comments)     Bleeding ulcers, tar stools     ROS:  Positive for left eye corneal dryness, for which she saw ophthalmology this morning, and was diagnosed with elevated pressure, though not meeting criteria for glaucoma.  She was prescribed drops, which she says helps her symptoms, but does not resolve them.  She does not know what the drops are called. Constitutional, HEENT, cardiovascular, pulmonary, GI and  systems are negative, except as otherwise noted.    Past medical, surgical, and family history reviewed and updated as appropriate in the chart.  Relevant social history added to social history tab.    Past Medical History:   Diagnosis Date     Borderline hypertension      H/O bee sting allergy      Osteoarthritis      PUD (peptic ulcer disease)      Seasonal allergies      No past surgical history on file.    Family History   Problem Relation Age of Onset     Alzheimer Disease Mother      Other - See Comments Father         Old age 93     Alzheimer Disease Father      Pancreatic Cancer Sister      Other - See Comments Brother         Heroin OD     Current Outpatient Medications    Medication     EPINEPHrine (EPIPEN/ADRENACLICK/OR ANY BX GENERIC EQUIV) 0.3 MG/0.3ML injection 2-pack     SUMAtriptan (IMITREX) 50 MG tablet     No current facility-administered medications for this visit.        Social History     Socioeconomic History     Marital status: Single     Spouse name: Not on file     Number of children: Not on file     Years of education: Not on file     Highest education level: Not on file   Occupational History     Not on file   Social Needs     Financial resource strain: Not on file     Food insecurity     Worry: Not on file     Inability: Not on file     Transportation needs     Medical: Not on file     Non-medical: Not on file   Tobacco Use     Smoking status: Current Every Day Smoker     Packs/day: 0.50     Years: 20.00     Pack years: 10.00     Types: Cigarettes     Last attempt to quit: 3/18/2018     Years since quittin.6     Smokeless tobacco: Never Used     Tobacco comment: clove cigarettes   Substance and Sexual Activity     Alcohol use: Yes     Frequency: Monthly or less     Drinks per session: 1 or 2     Comment: 1-2 per year     Drug use: No     Sexual activity: Yes     Partners: Male   Lifestyle     Physical activity     Days per week: Not on file     Minutes per session: Not on file     Stress: Not on file   Relationships     Social connections     Talks on phone: Not on file     Gets together: Not on file     Attends Sabianism service: Not on file     Active member of club or organization: Not on file     Attends meetings of clubs or organizations: Not on file     Relationship status: Not on file     Intimate partner violence     Fear of current or ex partner: Not on file     Emotionally abused: Not on file     Physically abused: Not on file     Forced sexual activity: Not on file   Other Topics Concern     Not on file   Social History Narrative    Single, but lives with SO on [address].  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        2020: Has 4  "adult children.  Cared for her elderly parents until they passed away.  She did note that was a stressful time.  Does smoke half pack per day. 10-pack-year history       OBJECTIVE:  BP (!) 136/90 (BP Location: Right arm, Patient Position: Sitting, Cuff Size: Adult Regular)   Pulse 98   Temp 97.8  F (36.6  C) (Tympanic)   Resp 16   Ht 1.753 m (5' 9\")   Wt 93.4 kg (206 lb)   SpO2 99%   Breastfeeding No   BMI 30.42 kg/m      EXAM:  Constitutional: Alert. No acute distress. Well-groomed, well-hydrated and well-nourished.  Appears stated age.  Head: Normocephalic, atraumatic.  Eyes: EOMI, anicteric, PERRL, slight proptosis of both eyes  Ears: Normal TMs bilaterally. Normal auditory canals and external ears.   OroPharynx: Moist mucous membranes. Dental hygiene adequate. Normal buccal mucosa. Normal pharynx.  Neck: Supple, with no masses or nodes. No lymphadenopathy.  No thyromegaly.  Respiratory: Non-labored respirations. Clear to auscultation bilaterally.  No wheezing, rhonchi, or rales.  Cardiovascular: Regular rate.  No murmur.  No lower extremity edema.  Abdominal: Soft, nontender, non-distended. No abnormal masses or organomegaly.  GI: Bowel sounds are present.  Skin: Warm, dry, intact.  No concerning rashes or lesions.  Musculoskeletal:  Right knee with joint line tenderness, no effusions, no meniscal or ACL instability.  Left foot with prominent MCP, joint is not hot or erythematous.  Moves arms and legs equally and normally.  Normal tone and strength throughout.  Neurologic: A+Ox3. CN 2-12 grossly intact. Normal gait. No focal motor or sensory deficits. No tremor.  Psychiatric: Does not appear anxious or depressed.  Appropriate affect and insight.    X-rays personally reviewed and interpreted with patient.    ASSESSEMENT AND PLAN:    1. Primary osteoarthritis of right knee  - XR Knee Right 3 Views; Future  -Return to clinic for right knee steroid injection  -PT recommended  -Continue Tylenol as needed, " avoid NSAIDs due to PUD    2. Bunion, left foot  - XR Foot 3 Views Standing Left; Future  - Orthopedic & Spine  Referral; Future    3. Intractable chronic migraine without aura and without status migrainosus  - SUMAtriptan (IMITREX) 50 MG tablet; Take 1 tablet (50 mg) by mouth at onset of headache for migraine May repeat in 2 hours. Max 4 tablets/24 hours.  Dispense: 20 tablet; Refill: 0    # Hypertension history: Patient has been on lisinopril 10 mg in the past, but is not taking currently.    # Peptic ulcer disease: Patient was seen in the emergency room 3-9-20 20, and the plan was to start protonix , carafate . Patient will  Be referred for outpatient EGD and colonoscopy.  We will address this at her next visit.    # Healthcare maintenance: Due for her mammogram.  Unknown timing of her last Pap smear.  Unknown timing of her last colonoscopy.  Though colonoscopy was recommended during her last ER visit for peptic ulcer disease.  We will plan to order that at her next visit.  Additionally we will get records from her last PCP.    Return to clinic for steroid injection and to address her other medical's concerns, including left arm numbness, and low back pain due to L5 herniated disc.  Additionally healthcare maintenance needs to be addressed.  Consider checking TSH for slight proptosis.    The majority of the 40 minute patient visit was spent coordinating care and counseling the patient on the above issue/s.    Michaelle Simon MD  [facility] AND hospitals

## 2020-10-30 NOTE — NURSING NOTE
Chief Complaint   Patient presents with     Establish Care     Here today to establish care. Moved up here about 2-3 years ago from the [facility]. Complains of L arm tingliness, Right leg numbness, chronic headaches, applying for sliding scale insulin and would like to talk about Advanced Directives. Unsure about last pap or date of colonoscopy. Is due for Mammogram.     Medication Reconciliation: complete    Kimmy Martinez, LPN

## 2020-11-05 ENCOUNTER — OFFICE VISIT (OUTPATIENT)
Dept: FAMILY MEDICINE | Facility: OTHER | Age: 55
End: 2020-11-05
Payer: COMMERCIAL

## 2020-11-05 ENCOUNTER — HOSPITAL ENCOUNTER (OUTPATIENT)
Dept: GENERAL RADIOLOGY | Facility: OTHER | Age: 55
End: 2020-11-05
Attending: FAMILY MEDICINE
Payer: COMMERCIAL

## 2020-11-05 VITALS
RESPIRATION RATE: 16 BRPM | WEIGHT: 196.4 LBS | BODY MASS INDEX: 29 KG/M2 | HEART RATE: 100 BPM | SYSTOLIC BLOOD PRESSURE: 142 MMHG | DIASTOLIC BLOOD PRESSURE: 100 MMHG | OXYGEN SATURATION: 98 % | TEMPERATURE: 98.3 F

## 2020-11-05 DIAGNOSIS — Z12.11 SCREENING FOR COLON CANCER: ICD-10-CM

## 2020-11-05 DIAGNOSIS — M51.35 DJD (DEGENERATIVE JOINT DISEASE), THORACOLUMBAR: ICD-10-CM

## 2020-11-05 DIAGNOSIS — M17.11 PRIMARY OSTEOARTHRITIS OF RIGHT KNEE: ICD-10-CM

## 2020-11-05 DIAGNOSIS — M47.22 OSTEOARTHRITIS OF SPINE WITH RADICULOPATHY, CERVICAL REGION: ICD-10-CM

## 2020-11-05 DIAGNOSIS — M47.22 OSTEOARTHRITIS OF SPINE WITH RADICULOPATHY, CERVICAL REGION: Primary | ICD-10-CM

## 2020-11-05 DIAGNOSIS — G43.719 INTRACTABLE CHRONIC MIGRAINE WITHOUT AURA AND WITHOUT STATUS MIGRAINOSUS: ICD-10-CM

## 2020-11-05 PROCEDURE — 96372 THER/PROPH/DIAG INJ SC/IM: CPT | Performed by: FAMILY MEDICINE

## 2020-11-05 PROCEDURE — 250N000011 HC RX IP 250 OP 636: Performed by: FAMILY MEDICINE

## 2020-11-05 PROCEDURE — 72100 X-RAY EXAM L-S SPINE 2/3 VWS: CPT

## 2020-11-05 PROCEDURE — 72080 X-RAY EXAM THORACOLMB 2/> VW: CPT

## 2020-11-05 PROCEDURE — 72040 X-RAY EXAM NECK SPINE 2-3 VW: CPT

## 2020-11-05 PROCEDURE — G0463 HOSPITAL OUTPT CLINIC VISIT: HCPCS

## 2020-11-05 PROCEDURE — 99214 OFFICE O/P EST MOD 30 MIN: CPT | Mod: 25 | Performed by: FAMILY MEDICINE

## 2020-11-05 RX ORDER — SUMATRIPTAN 50 MG/1
50 TABLET, FILM COATED ORAL
Qty: 30 TABLET | Refills: 0 | Status: SHIPPED | OUTPATIENT
Start: 2020-11-05 | End: 2020-11-19

## 2020-11-05 RX ORDER — BUTALBITAL, ASPIRIN, AND CAFFEINE 325; 50; 40 MG/1; MG/1; MG/1
1 CAPSULE ORAL EVERY 4 HOURS PRN
Qty: 30 CAPSULE | Refills: 0 | Status: SHIPPED | OUTPATIENT
Start: 2020-11-05 | End: 2020-11-19 | Stop reason: ALTCHOICE

## 2020-11-05 RX ORDER — TRIAMCINOLONE ACETONIDE 40 MG/ML
40 INJECTION, SUSPENSION INTRA-ARTICULAR; INTRAMUSCULAR ONCE
Status: COMPLETED | OUTPATIENT
Start: 2020-11-05 | End: 2020-11-05

## 2020-11-05 RX ORDER — BUTALBITAL, ACETAMINOPHEN AND CAFFEINE 50; 325; 40 MG/1; MG/1; MG/1
1 TABLET ORAL EVERY 4 HOURS PRN
Qty: 30 TABLET | Refills: 0 | Status: SHIPPED | OUTPATIENT
Start: 2020-11-05 | End: 2020-12-04

## 2020-11-05 RX ADMIN — TRIAMCINOLONE ACETONIDE 40 MG: 40 INJECTION, SUSPENSION INTRA-ARTICULAR; INTRAMUSCULAR at 14:38

## 2020-11-05 ASSESSMENT — PAIN SCALES - GENERAL: PAINLEVEL: MODERATE PAIN (4)

## 2020-11-05 NOTE — NURSING NOTE
"Chief Complaint   Patient presents with     Knee Pain    x-ray were completed on 10/30/20. Just a little nervous about injection.      Initial BP (!) 142/100   Pulse 100   Temp 98.3  F (36.8  C) (Tympanic)   Resp 16   Wt 89.1 kg (196 lb 6.4 oz)   SpO2 98%   Breastfeeding No   BMI 29.00 kg/m   Estimated body mass index is 29 kg/m  as calculated from the following:    Height as of 10/30/20: 1.753 m (5' 9\").    Weight as of this encounter: 89.1 kg (196 lb 6.4 oz).  Medication Reconciliation: complete    Gabriella Barajas LPN    "
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
## 2020-11-06 DIAGNOSIS — Z01.818 PRE-OP TESTING: Primary | ICD-10-CM

## 2020-11-06 DIAGNOSIS — Z12.11 SCREENING FOR COLON CANCER: ICD-10-CM

## 2020-11-06 NOTE — TELEPHONE ENCOUNTER
Screening Questions for the Scheduling of Screening Colonoscopies   (If Colonoscopy is diagnostic, Provider should review the chart before scheduling.)  Are you younger than 50 or older than 80?   NO   Do you take aspirin or fish oil?  NO  (if yes, tell patient to stop 1 week prior to Colonoscopy)  Do you take warfarin (Coumadin), clopidogrel (Plavix), apixaban (Eliquis), dabigatram (Pradaxa), rivaroxaban (Xarelto) or any blood thinner? NO   Do you use oxygen at home?  NO   Do you have kidney disease? NO   Are you on dialysis? NO   Have you had a stroke or heart attack in the last year? NO   Have you had a stent in your heart or any blood vessel in the last year? NO   Have you had a transplant of any organ? NO   Have you had a colonoscopy or upper endoscopy (EGD) before? NO          When?    Date of scheduled Colonoscopy. 12/17/2020  Provider [facility]   Pharmacy [facility]

## 2020-11-12 ENCOUNTER — OFFICE VISIT (OUTPATIENT)
Dept: ORTHOPEDICS | Facility: OTHER | Age: 55
End: 2020-11-12
Attending: PODIATRIST
Payer: COMMERCIAL

## 2020-11-12 VITALS
SYSTOLIC BLOOD PRESSURE: 144 MMHG | BODY MASS INDEX: 28.8 KG/M2 | HEART RATE: 84 BPM | DIASTOLIC BLOOD PRESSURE: 90 MMHG | WEIGHT: 195 LBS

## 2020-11-12 DIAGNOSIS — M20.22 HALLUX RIGIDUS OF LEFT FOOT: Primary | ICD-10-CM

## 2020-11-12 DIAGNOSIS — M77.42 METATARSALGIA OF LEFT FOOT: ICD-10-CM

## 2020-11-12 PROCEDURE — 99202 OFFICE O/P NEW SF 15 MIN: CPT | Performed by: PODIATRIST

## 2020-11-12 PROCEDURE — G0463 HOSPITAL OUTPT CLINIC VISIT: HCPCS

## 2020-11-12 NOTE — PROGRESS NOTES
Visit Date:   11/12/2020      HISTORY OF PRESENT ILLNESS:  Lilian is here to see me regarding left foot bunion.  She has had a pain associated with this for quite some time.  Primary care provider recommended possible surgery on this given the extent of pain.  She is here today specifically to discuss this option and recovery and in hopes of getting scheduled.      HISTORY REVIEW:  I have reviewed this patient's past medical history, family history, social history as well as medications and allergies.  Any changes/additions were appropriately charted in the patient's electronic medical record.        PHYSICAL EXAMINATION:    CONSTITUTIONAL:  The patient is alert and oriented x3, well appearing and in no apparent distress.  Affect is pleasant and answers questions appropriately.   VASCULAR:  Circulation is intact with palpable pedal pulses and adequate capillary fill time to all digits.  Hair growth is present and appropriate to mid foot and digits.   NEUROLOGIC:  Light touch sensation is intact to digits.  There is a negative Tinel sign.  There are no signs of apparent nerve entrapment of superficial peroneal or common peroneal nerves.   INTEGUMENT:  No abnormal dermatologic lesions are noted.  Skin has normal texture and turgor.     MUSCULOSKELETAL:  The patient does have a fairly significant bunion deformity and eminence to her medial metatarsal head is evident and tender to palpate.  Most of her pain is within the joint.  She is quite evidently arthritic with very limited range of motion, tender range of motion.  Most of her pain is associated with the joint itself.  Her hindfoot is well aligned.  She has good motion of the ankle.  Walks in normal shoegear without assistive device.      IMAGING:  Three views of the patient's left foot were taken previously.  She does have end-stage first MPJ arthrosis, associated bunion deformity, second MPJ pain, arthrosis is also evident and also has clinical pain here as well.       ASSESSMENT:  Left foot hallux rigidus associated with bunion and metatarsalgia.      PLAN:  I discussed condition and treatment with the patient today.  Given findings on x-ray and extent of pain and deformity, I did discuss surgery with her, which I think is appropriate.  She has accommodated in appropriate shoes for quite some time.  She would like to get this scheduled.  I discussed surgery would consist of a first MPJ fusion, second head resection, which I think would work quite well for her.  She will require preop prior to proceeding.  We discussed surgery, recovery, risks, potential complications, alternatives and benefits in detail.  Followup accordingly postoperatively.         JULIAN MILLER DPM             D: 2020   T: 2020   MT: [id number]      Name:     KAYLA BAILEY   MRN:      [id number]        Account:      [id number]   :      1965           Visit Date:   2020      Document: [id number]

## 2020-11-12 NOTE — PROGRESS NOTES
Patient is here for consult on her left foot.  Meme Dumont LPN .....................11/12/2020 1:02 PM

## 2020-11-13 ENCOUNTER — TELEPHONE (OUTPATIENT)
Dept: FAMILY MEDICINE | Facility: OTHER | Age: 55
End: 2020-11-13

## 2020-11-13 NOTE — TELEPHONE ENCOUNTER
Called and verified patient full name and  with Rupesh. Rupesh stated that patient already filled her 18 tabs of her Sumatriptan for the month. Wondering if we would like to resubmit and PA or if patient should be taking the other medication that was prescribed. Rupesh notified that PCP was out today. Will follow up on Monday.     Kimmy Martinez LPN on 2020 at 2:17 PM

## 2020-11-16 PROBLEM — M20.22 HALLUX RIGIDUS OF LEFT FOOT: Status: ACTIVE | Noted: 2020-11-16

## 2020-11-16 PROBLEM — M77.42 METATARSALGIA OF LEFT FOOT: Status: ACTIVE | Noted: 2020-11-16

## 2020-11-18 NOTE — TELEPHONE ENCOUNTER
Closing encounter. Patient has appt tomorrow with CCN.     Kimmy Martinez LPN on 11/18/2020 at 1:47 PM

## 2020-11-18 NOTE — TELEPHONE ENCOUNTER
Have her start taking the other medication I prescribed first to see if that is effective as we will be able to use more those doses than the sumatriptan in the future. Michaelle Simon MD

## 2020-11-19 ENCOUNTER — OFFICE VISIT (OUTPATIENT)
Dept: FAMILY MEDICINE | Facility: OTHER | Age: 55
End: 2020-11-19
Attending: FAMILY MEDICINE
Payer: COMMERCIAL

## 2020-11-19 VITALS
RESPIRATION RATE: 18 BRPM | OXYGEN SATURATION: 99 % | BODY MASS INDEX: 28.94 KG/M2 | HEART RATE: 94 BPM | SYSTOLIC BLOOD PRESSURE: 140 MMHG | TEMPERATURE: 98.3 F | HEIGHT: 69 IN | WEIGHT: 195.4 LBS | DIASTOLIC BLOOD PRESSURE: 82 MMHG

## 2020-11-19 DIAGNOSIS — M47.26 OSTEOARTHRITIS OF SPINE WITH RADICULOPATHY, LUMBAR REGION: ICD-10-CM

## 2020-11-19 DIAGNOSIS — G43.719 INTRACTABLE CHRONIC MIGRAINE WITHOUT AURA AND WITHOUT STATUS MIGRAINOSUS: ICD-10-CM

## 2020-11-19 DIAGNOSIS — R92.30 DENSE BREAST TISSUE: ICD-10-CM

## 2020-11-19 DIAGNOSIS — Z00.00 ROUTINE GENERAL MEDICAL EXAMINATION AT A HEALTH CARE FACILITY: Primary | ICD-10-CM

## 2020-11-19 DIAGNOSIS — Z12.4 SCREENING FOR CERVICAL CANCER: ICD-10-CM

## 2020-11-19 DIAGNOSIS — Z12.31 ENCOUNTER FOR SCREENING MAMMOGRAM FOR BREAST CANCER: ICD-10-CM

## 2020-11-19 DIAGNOSIS — Z71.89 ADVANCED CARE PLANNING/COUNSELING DISCUSSION: ICD-10-CM

## 2020-11-19 DIAGNOSIS — M47.22 OSTEOARTHRITIS OF SPINE WITH RADICULOPATHY, CERVICAL REGION: ICD-10-CM

## 2020-11-19 DIAGNOSIS — E78.00 HYPERCHOLESTEROLEMIA: ICD-10-CM

## 2020-11-19 LAB
ALBUMIN SERPL-MCNC: 4.8 G/DL (ref 3.5–5.7)
ALP SERPL-CCNC: 66 U/L (ref 34–104)
ALT SERPL W P-5'-P-CCNC: 32 U/L (ref 7–52)
ANION GAP SERPL CALCULATED.3IONS-SCNC: 7 MMOL/L (ref 3–14)
AST SERPL W P-5'-P-CCNC: 16 U/L (ref 13–39)
BILIRUB SERPL-MCNC: 0.4 MG/DL (ref 0.3–1)
BUN SERPL-MCNC: 16 MG/DL (ref 7–25)
CALCIUM SERPL-MCNC: 10.3 MG/DL (ref 8.6–10.3)
CHLORIDE SERPL-SCNC: 102 MMOL/L (ref 98–107)
CHOLEST SERPL-MCNC: 232 MG/DL
CO2 SERPL-SCNC: 30 MMOL/L (ref 21–31)
CREAT SERPL-MCNC: 0.81 MG/DL (ref 0.6–1.2)
ERYTHROCYTE [DISTWIDTH] IN BLOOD BY AUTOMATED COUNT: 12.2 % (ref 10–15)
GFR SERPL CREATININE-BSD FRML MDRD: 73 ML/MIN/{1.73_M2}
GLUCOSE SERPL-MCNC: 95 MG/DL (ref 70–105)
HCT VFR BLD AUTO: 42.7 % (ref 35–47)
HDLC SERPL-MCNC: 50 MG/DL (ref 23–92)
HGB BLD-MCNC: 13.6 G/DL (ref 11.7–15.7)
LDLC SERPL CALC-MCNC: 164 MG/DL
MCH RBC QN AUTO: 29.5 PG (ref 26.5–33)
MCHC RBC AUTO-ENTMCNC: 31.9 G/DL (ref 31.5–36.5)
MCV RBC AUTO: 93 FL (ref 78–100)
NONHDLC SERPL-MCNC: 182 MG/DL
PLATELET # BLD AUTO: 385 10E9/L (ref 150–450)
POTASSIUM SERPL-SCNC: 3.9 MMOL/L (ref 3.5–5.1)
PROT SERPL-MCNC: 8.2 G/DL (ref 6.4–8.9)
RBC # BLD AUTO: 4.61 10E12/L (ref 3.8–5.2)
SODIUM SERPL-SCNC: 139 MMOL/L (ref 134–144)
TRIGL SERPL-MCNC: 91 MG/DL
TSH SERPL DL<=0.05 MIU/L-ACNC: 1.48 IU/ML (ref 0.34–5.6)
WBC # BLD AUTO: 9.9 10E9/L (ref 4–11)

## 2020-11-19 PROCEDURE — 88142 CYTOPATH C/V THIN LAYER: CPT | Performed by: FAMILY MEDICINE

## 2020-11-19 PROCEDURE — 80053 COMPREHEN METABOLIC PANEL: CPT | Mod: ZL | Performed by: FAMILY MEDICINE

## 2020-11-19 PROCEDURE — 84443 ASSAY THYROID STIM HORMONE: CPT | Mod: ZL | Performed by: FAMILY MEDICINE

## 2020-11-19 PROCEDURE — 80061 LIPID PANEL: CPT | Mod: ZL | Performed by: FAMILY MEDICINE

## 2020-11-19 PROCEDURE — 99396 PREV VISIT EST AGE 40-64: CPT | Mod: 25 | Performed by: FAMILY MEDICINE

## 2020-11-19 PROCEDURE — 87624 HPV HI-RISK TYP POOLED RSLT: CPT | Mod: ZL | Performed by: FAMILY MEDICINE

## 2020-11-19 PROCEDURE — 85027 COMPLETE CBC AUTOMATED: CPT | Mod: ZL | Performed by: FAMILY MEDICINE

## 2020-11-19 PROCEDURE — G0123 SCREEN CERV/VAG THIN LAYER: HCPCS | Performed by: FAMILY MEDICINE

## 2020-11-19 PROCEDURE — 36415 COLL VENOUS BLD VENIPUNCTURE: CPT | Mod: ZL | Performed by: FAMILY MEDICINE

## 2020-11-19 RX ORDER — TOPIRAMATE 25 MG/1
25 TABLET, FILM COATED ORAL DAILY
Qty: 30 TABLET | Refills: 0 | Status: SHIPPED | OUTPATIENT
Start: 2020-11-19 | End: 2020-12-04 | Stop reason: DRUGHIGH

## 2020-11-19 ASSESSMENT — MIFFLIN-ST. JEOR: SCORE: 1545.71

## 2020-11-19 ASSESSMENT — PAIN SCALES - GENERAL: PAINLEVEL: MODERATE PAIN (4)

## 2020-11-19 NOTE — NURSING NOTE
"Chief Complaint   Patient presents with     Physical       Initial BP (!) 140/82 (BP Location: Right arm, Patient Position: Sitting, Cuff Size: Adult Regular)   Pulse 94   Temp 98.3  F (36.8  C) (Tympanic)   Resp 18   Ht 1.753 m (5' 9\")   Wt 88.6 kg (195 lb 6.4 oz)   SpO2 99%   Breastfeeding No   BMI 28.86 kg/m   Estimated body mass index is 28.86 kg/m  as calculated from the following:    Height as of this encounter: 1.753 m (5' 9\").    Weight as of this encounter: 88.6 kg (195 lb 6.4 oz).  Medication Reconciliation: complete    Rosalina Quintanilla LPN    "

## 2020-11-19 NOTE — PROGRESS NOTES
SUBJECTIVE:   CC: Lilian Craft is an 55 year old woman who presents for preventive health visit, and for follow-up of her migraines.    Patient has been advised of split billing requirements and indicates understanding: Yes  Healthy Habits:    Do you get at least three servings of calcium containing foods daily (dairy, green leafy vegetables, etc.)? yes    Amount of exercise or daily activities, outside of work: Some daily exercise    Problems taking medications regularly No    Medication side effects: No    Have you had an eye exam in the past two years? yes    Do you see a dentist twice per year? yes    Do you have sleep apnea, excessive snoring or daytime drowsiness?no    Risk for STI?: no  Last pap: Unknown, but more than 5 years ago  Any hx of abnormal paps: Yes, history of a cone biopsy more than 20 years ago  FH of early CA?:  Denies  Tobacco?: clove cigarettes, on/off since teenage years   Calcium intake: From diet  DEXA: At age 65  Last mammo: More than 3 years ago  Colonoscopy: Scheduled for 12/17/2020  Immunizations: Shingrix declined  Cholesterol: Due for recheck today    Periods: None  Breast concerns: Dense breast tissue    # Left foot bunion: Patient saw Ortho, and has a surgery scheduled for mid December with the first MPJ fusion and bunion resection.    # Right knee osteoarthritis: Had steroid injection at her last visit on 11/5. Mild relief only.  She is wondering about a gel injection.    # Peptic ulcer disease: Patient was seen in the emergency room 3-9-20 20, and the plan was to start protonix , carafate . Patient will  Be referred for outpatient EGD and colonoscopy.  Patient reports that her pain has significantly improved since she stopped drinking.     # Migraines: She is getting some relief from Imitrex, though was only given 9/month from her insurance company.  Fiorecet is helpful, but she continues to have daily migraines.  She is interested in a migraine prophylaxis medication.     #  DJD: X-rays reviewed from last visit.  MRIs are warranted due to her radiculopathy.  See prior clinic notes.    Today's PHQ-2 Score:   PHQ-2 (  [employer]) 2020   Q1: Little interest or pleasure in doing things 0 0   Q2: Feeling down, depressed or hopeless 0 0   PHQ-2 Score 0 0     Have you ever done Advance Care Planning? (For example, a Health Directive, POLST, or a discussion with a medical provider or your loved ones about your wishes): Yes, advance care planning is on file.  Discussed and filled out during today's visit.    Social History     Tobacco Use     Smoking status: Current Every Day Smoker     Packs/day: 0.50     Years: 20.00     Pack years: 10.00     Types: Cigarettes     Last attempt to quit: 3/18/2018     Years since quittin.6     Smokeless tobacco: Never Used     Tobacco comment: clove cigarettes   Substance Use Topics     Alcohol use: Yes     Frequency: Monthly or less     Drinks per session: 1 or 2     Comment: 1-2 per year     If you drink alcohol do you typically have >3 drinks per day or >7 drinks per week? No                     Reviewed orders with patient.  Reviewed health maintenance and updated orders accordingly - Yes  Lab work is in process    Mammogram Screening: Patient under age 50, mutual decision reflected in health maintenance.      Pertinent mammograms are reviewed under the imaging tab.  History of abnormal Pap smear: YES - updated in Problem List and Health Maintenance accordingly     Reviewed and updated as needed this visit by clinical staff  Tobacco  Allergies  Meds              Reviewed and updated as needed this visit by Provider                Past Medical History:   Diagnosis Date     Borderline hypertension      H/O bee sting allergy      Osteoarthritis      PUD (peptic ulcer disease)      Seasonal allergies       No past surgical history on file.  OB History    Para Term  AB Living   0 0 0 0 0 4   SAB TAB Ectopic Multiple Live  "Births   0 0 0 0 0     ROS:  Constitutional, HEENT, cardiovascular, pulmonary, GI and  systems are negative, except as otherwise noted.    OBJECTIVE:   BP (!) 140/82 (BP Location: Right arm, Patient Position: Sitting, Cuff Size: Adult Regular)   Pulse 94   Temp 98.3  F (36.8  C) (Tympanic)   Resp 18   Ht 1.753 m (5' 9\")   Wt 88.6 kg (195 lb 6.4 oz)   SpO2 99%   Breastfeeding No   BMI 28.86 kg/m    EXAM:  GENERAL APPEARANCE: healthy, alert and no distress  EYES: Eyes grossly normal to inspection, PERRL and conjunctivae and sclerae normal  HENT: ear canals and TM's normal, nose and mouth without ulcers or lesions, oropharynx clear and oral mucous membranes moist  NECK: no adenopathy, no asymmetry, masses, or scars and thyroid normal to palpation  RESP: lungs clear to auscultation - no rales, rhonchi or wheezes  BREAST: normal without masses, tenderness or nipple discharge and no palpable axillary masses or adenopathy  CV: regular rate and rhythm, normal S1 S2, no S3 or S4, no murmur, click or rub, no peripheral edema and peripheral pulses strong  ABDOMEN: soft, nontender, no hepatosplenomegaly, no masses and bowel sounds normal   (female): normal female external genitalia, normal urethral meatus, vaginal mucosal atrophy noted, normal cervix, adnexae, and uterus without masses or abnormal discharge  MS: no musculoskeletal defects are noted and gait is age appropriate without ataxia-arthritis exam findings addressed at previous visit  SKIN: no suspicious lesions or rashes  NEURO: Normal strength and tone, sensory exam grossly normal, mentation intact and speech normal  PSYCH: mentation appears normal and affect normal/bright    X-rays from previous visit reviewed:  FINDINGS: The thoracic discs are normal in height. Mild degenerative  osteophytes are seen in the lower thoracic spine. Thoracic scoliosis  is noted. The paravertebral soft tissues appear normal.                                                     "            IMPRESSION: Mild thoracic scoliosis    FINDINGS: There is spondylolisthesis of L3 on L4. There is forward  slippage by approximately 7 mm. There is decrease in height in the  L3-L4 L4-L5 and L5-S1 discs. Lower lumbar facet joint degenerative  changes are noted. There is scoliosis concave right. The paravertebral  soft tissues appear normal.                                                                    IMPRESSION: Spondylolisthesis L3 on L4    FINDINGS: There is decrease in height in the C4-C5 C5-C6 and C6-C7  disks with anterior posterior osteophytes. There is uncovertebral  joint spurring seen at C5-C6 and C6-C7. There is scoliosis concave to  the patient's right. The prevertebral soft tissues appear normal.                                                                    IMPRESSION: Advanced degenerative changes of the cervical spine    The 10-year ASCVD risk score (Nisreen ESPINAL Jr., et al., 2013) is: 7.3%    Values used to calculate the score:      Age: 55 years      Sex: Female      Is Non- : No      Diabetic: No      Tobacco smoker: Yes      Systolic Blood Pressure: 140 mmHg      Is BP treated: No      HDL Cholesterol: 50 mg/dL      Total Cholesterol: 232 mg/dL    ASSESSMENT/PLAN:       ICD-10-CM    1. Routine general medical examination at a health care facility  Z00.00 CBC W PLT No Diff     Comprehensive Metabolic Panel     Comprehensive Metabolic Panel     CBC W PLT No Diff     Thyrotropin GH     CANCELED: TSH   2. Screening for cervical cancer  Z12.4 HPV High Risk Types DNA Cervical     Pap imaged thin layer screen with HPV - recommended age 30 - 65   3. Hypercholesterolemia  E78.00 Lipid Panel     Lipid Panel   4. Encounter for screening mammogram for breast cancer  Z12.31 MA Screen Bilateral w/Hayden   5. Intractable chronic migraine without aura and without status migrainosus  G43.719 topiramate (TOPAMAX) 25 MG tablet   6. Osteoarthritis of spine with radiculopathy,  "cervical region  M47.22 MR Lumbar Spine w/o Contrast   7. Osteoarthritis of spine with radiculopathy, lumbar region  M47.26 MR Cervical Spine w/o Contrast   8. Dense breast tissue  R92.2 MA Screen Bilateral w/Hayden   9. Advanced care planning/counseling discussion  Z71.89    5.  Imitrex discontinued.  Fioricet to be used as needed.  Initiate trial of daily Topamax 25 mg today.  Return to clinic in 2 to 3 weeks to monitor symptoms.    Return to clinic also for preop for foot surgery.    Patient has been advised of split billing requirements and indicates understanding: Yes  COUNSELING:   Reviewed preventive health counseling, as reflected in patient instructions    Estimated body mass index is 28.86 kg/m  as calculated from the following:    Height as of this encounter: 1.753 m (5' 9\").    Weight as of this encounter: 88.6 kg (195 lb 6.4 oz).    Weight management plan: Discussed healthy diet and exercise guidelines    She reports that she has been smoking cigarettes. She has a 10.00 pack-year smoking history. She has never used smokeless tobacco.  Tobacco Cessation Action Plan:   Information offered: Patient not interested at this time    Counseling Resources:  ATP IV Guidelines  Pooled Cohorts Equation Calculator  Breast Cancer Risk Calculator  BRCA-Related Cancer Risk Assessment: FHS-7 Tool  FRAX Risk Assessment  ICSI Preventive Guidelines  Dietary Guidelines for Americans, 2010  USDA's MyPlate  ASA Prophylaxis  Lung CA Screening    An additional 15 minutes of face-to-face time was spent reviewing lab results, counseling, and coordinating care for the patient's migraines and osteoarthritis, separate from her preventive health visit.    Michaelle Simon MD  [facility]  "

## 2020-11-20 PROCEDURE — G0123 SCREEN CERV/VAG THIN LAYER: HCPCS | Performed by: FAMILY MEDICINE

## 2020-11-20 RX ORDER — POLYETHYLENE GLYCOL 3350, SODIUM CHLORIDE, SODIUM BICARBONATE, POTASSIUM CHLORIDE 420; 11.2; 5.72; 1.48 G/4L; G/4L; G/4L; G/4L
4000 POWDER, FOR SOLUTION ORAL ONCE
Qty: 4000 ML | Refills: 0 | Status: SHIPPED | OUTPATIENT
Start: 2020-11-20 | End: 2020-11-20

## 2020-11-20 RX ORDER — BISACODYL 5 MG/1
TABLET, DELAYED RELEASE ORAL
Qty: 2 TABLET | Refills: 0 | Status: ON HOLD | OUTPATIENT
Start: 2020-11-20 | End: 2020-12-15

## 2020-11-24 ENCOUNTER — HOSPITAL ENCOUNTER (OUTPATIENT)
Dept: MRI IMAGING | Facility: OTHER | Age: 55
End: 2020-11-24
Attending: FAMILY MEDICINE
Payer: COMMERCIAL

## 2020-11-24 DIAGNOSIS — M47.22 OSTEOARTHRITIS OF SPINE WITH RADICULOPATHY, CERVICAL REGION: ICD-10-CM

## 2020-11-24 DIAGNOSIS — M47.26 OSTEOARTHRITIS OF SPINE WITH RADICULOPATHY, LUMBAR REGION: ICD-10-CM

## 2020-11-24 LAB
COPATH REPORT: NORMAL
PAP: NORMAL

## 2020-11-24 PROCEDURE — 72141 MRI NECK SPINE W/O DYE: CPT

## 2020-11-24 PROCEDURE — 72148 MRI LUMBAR SPINE W/O DYE: CPT

## 2020-11-27 LAB
FINAL DIAGNOSIS: NORMAL
HPV HR 12 DNA CVX QL NAA+PROBE: NEGATIVE
HPV16 DNA SPEC QL NAA+PROBE: NEGATIVE
HPV18 DNA SPEC QL NAA+PROBE: NEGATIVE
SPECIMEN DESCRIPTION: NORMAL
SPECIMEN SOURCE CVX/VAG CYTO: NORMAL

## 2020-12-04 ENCOUNTER — OFFICE VISIT (OUTPATIENT)
Dept: FAMILY MEDICINE | Facility: OTHER | Age: 55
End: 2020-12-04
Attending: FAMILY MEDICINE
Payer: COMMERCIAL

## 2020-12-04 VITALS
SYSTOLIC BLOOD PRESSURE: 136 MMHG | TEMPERATURE: 98.4 F | HEART RATE: 98 BPM | OXYGEN SATURATION: 100 % | BODY MASS INDEX: 28.62 KG/M2 | RESPIRATION RATE: 18 BRPM | WEIGHT: 193.8 LBS | DIASTOLIC BLOOD PRESSURE: 78 MMHG

## 2020-12-04 DIAGNOSIS — G43.719 INTRACTABLE CHRONIC MIGRAINE WITHOUT AURA AND WITHOUT STATUS MIGRAINOSUS: ICD-10-CM

## 2020-12-04 DIAGNOSIS — H04.123 DRY EYES: ICD-10-CM

## 2020-12-04 DIAGNOSIS — M54.16 SPINAL STENOSIS OF LUMBAR REGION WITH RADICULOPATHY: ICD-10-CM

## 2020-12-04 DIAGNOSIS — Z01.818 PREOP GENERAL PHYSICAL EXAM: Primary | ICD-10-CM

## 2020-12-04 DIAGNOSIS — M48.02 SPINAL STENOSIS IN CERVICAL REGION: ICD-10-CM

## 2020-12-04 DIAGNOSIS — M48.061 SPINAL STENOSIS OF LUMBAR REGION WITH RADICULOPATHY: ICD-10-CM

## 2020-12-04 PROCEDURE — G0463 HOSPITAL OUTPT CLINIC VISIT: HCPCS

## 2020-12-04 PROCEDURE — 99214 OFFICE O/P EST MOD 30 MIN: CPT | Performed by: FAMILY MEDICINE

## 2020-12-04 RX ORDER — TOPIRAMATE 50 MG/1
50 TABLET, FILM COATED ORAL DAILY
Qty: 90 TABLET | Refills: 1 | Status: SHIPPED | OUTPATIENT
Start: 2020-12-04 | End: 2021-06-10

## 2020-12-04 RX ORDER — BUTALBITAL, ACETAMINOPHEN AND CAFFEINE 50; 325; 40 MG/1; MG/1; MG/1
1 TABLET ORAL EVERY 4 HOURS PRN
Qty: 30 TABLET | Refills: 0 | Status: SHIPPED | OUTPATIENT
Start: 2020-12-04 | End: 2021-01-08

## 2020-12-04 RX ORDER — CYCLOSPORINE 0.5 MG/ML
1 EMULSION OPHTHALMIC 2 TIMES DAILY
Qty: 5.5 ML | Refills: 0 | Status: SHIPPED | OUTPATIENT
Start: 2020-12-04 | End: 2021-01-28

## 2020-12-04 ASSESSMENT — PAIN SCALES - GENERAL: PAINLEVEL: MODERATE PAIN (4)

## 2020-12-04 NOTE — NURSING NOTE
"Patient presents to clinic on preop appointment for L foot metatarsalgia. Surgery with Dr Parish scheduled for 12/18/2020. Patient also has a colonoscopy on 12/15/2020 with Dr. Young.  Chief Complaint   Patient presents with     Pre-Op Exam       Initial BP (!) 142/78 (BP Location: Right arm, Patient Position: Sitting, Cuff Size: Adult Regular)   Pulse 98   Temp 98.4  F (36.9  C) (Tympanic)   Resp 18   Wt 87.9 kg (193 lb 12.8 oz)   SpO2 100%   Breastfeeding No   BMI 28.62 kg/m   Estimated body mass index is 28.62 kg/m  as calculated from the following:    Height as of 11/19/20: 1.753 m (5' 9\").    Weight as of this encounter: 87.9 kg (193 lb 12.8 oz).  Medication Reconciliation: complete    Vero Garcia LPN    "

## 2020-12-04 NOTE — PROGRESS NOTES
[facility]  [address]  [place] [address]  Phone: [phone]  Fax: [phone]  Primary Provider: Jason Koch  Pre-op Performing Provider: AZUCENA VASQUEZ    PREOPERATIVE EVALUATION:  Today's date: 12/4/2020    Lilian Craft is a 55 year old female who presents for a preoperative evaluation.    Surgical Information:  Surgery/Procedure: first MPJ fusion, second head resection  Surgery Location: [facility]  Surgeon: Dr. Parish   Surgery Date: 12/18/2020  Where patient plans to recover: At home with family  Fax number for surgical facility: Note does not need to be faxed, will be available electronically in Epic.    Type of Anesthesia Anticipated: Local with MAC    Subjective     HPI related to upcoming procedure: History of left foot bunion, confirmed by XR. She has had a pain associated with this for quite some time.  She had an orthopedic consult who agrees that surgical intervention would be appropriate.     Preop Questions 12/4/2020   1. Have you ever had a heart attack or stroke? No   2. Have you ever had surgery on your heart or blood vessels, such as a stent placement, a coronary artery bypass, or surgery on an artery in your head, neck, heart, or legs? No   3. Do you have chest pain with activity? No   4. Do you have a history of  heart failure? No   5. Do you currently have a cold, bronchitis or symptoms of other infection? No   6. Do you have a cough, shortness of breath, or wheezing? No   7. Do you or anyone in your family have previous history of blood clots? No   8. Do you or does anyone in your family have a serious bleeding problem such as prolonged bleeding following surgeries or cuts? No   9. Have you ever had problems with anemia or been told to take iron pills? No   10. Have you had any abnormal blood loss such as black, tarry or bloody stools, or abnormal vaginal bleeding? YES - hx of PUD, which resolved with abstinence from alcohol   11. Have you  ever had a blood transfusion? No   12. Are you willing to have a blood transfusion if it is medically needed before, during, or after your surgery? Yes   13. Have you or any of your relatives ever had problems with anesthesia? No   14. Do you have sleep apnea, excessive snoring or daytime drowsiness? No   15. Do you have any artifical heart valves or other implanted medical devices like a pacemaker, defibrillator, or continuous glucose monitor? No   16. Do you have artificial joints? No   17. Are you allergic to latex? No   18. Is there any chance that you may be pregnant? No     Health Care Directive:  Patient has a Health Care Directive on file    Preoperative Review of :   reviewed - controlled substances reflected in medication list.    Status of Chronic Conditions:  See problem list for active medical problems.  Problems all longstanding and stable, except as noted/documented.  See ROS for pertinent symptoms related to these conditions.    Patient has a history of migraines, which she is being treated with Topamax daily, and Imitrex as needed.  She also has a history of cervical and lumbar osteoarthritis in addition to knee osteoarthritis.  She has a history of alcoholism in remission.    Review of Systems  Constitutional, HEENT, cardiovascular, pulmonary, GI and  systems are negative, except as otherwise noted.    Patient Active Problem List    Diagnosis Date Noted     Advanced care planning/counseling discussion 11/19/2020     Priority: Medium     Osteoarthritis of spine with radiculopathy, cervical region 11/19/2020     Priority: Medium     Osteoarthritis of spine with radiculopathy, lumbar region 11/19/2020     Priority: Medium     Hypercholesterolemia 11/19/2020     Priority: Medium     Metatarsalgia of left foot 11/16/2020     Priority: Medium     Added automatically from request for surgery [id number]       Hallux rigidus of left foot 11/16/2020     Priority: Medium     Added automatically from  request for surgery [id number]       Primary osteoarthritis of right knee 10/30/2020     Priority: Medium     Intractable chronic migraine without aura and without status migrainosus 10/30/2020     Priority: Medium     Bunion, left 10/30/2020     Priority: Medium     Primary osteoarthritis involving multiple joints 2019     Priority: Medium     H/O bee sting allergy      Priority: Medium     Borderline hypertension      Priority: Medium      Past Medical History:   Diagnosis Date     Borderline hypertension      H/O bee sting allergy      Osteoarthritis      PUD (peptic ulcer disease)      Seasonal allergies      No past surgical history on file.  Current Outpatient Medications   Medication Sig Dispense Refill     bisacodyl (DULCOLAX) 5 MG EC tablet Take as directed by colonoscopy prep instructions 2 tablet 0     butalbital-acetaminophen-caffeine (ESGIC) -40 MG tablet Take 1 tablet by mouth every 4 hours as needed for headaches 30 tablet 0     EPINEPHrine (EPIPEN/ADRENACLICK/OR ANY BX GENERIC EQUIV) 0.3 MG/0.3ML injection 2-pack Inject 0.3 mLs (0.3 mg) into the muscle as needed for anaphylaxis 1 mL 3     topiramate (TOPAMAX) 25 MG tablet Take 1 tablet (25 mg) by mouth daily for 30 doses 30 tablet 0       Allergies   Allergen Reactions     Bee Venom Swelling     Nsaids Other (See Comments)     Bleeding ulcers, tar stools        Social History     Tobacco Use     Smoking status: Current Every Day Smoker     Packs/day: 0.50     Years: 20.00     Pack years: 10.00     Types: Cigarettes     Last attempt to quit: 3/18/2018     Years since quittin.7     Smokeless tobacco: Never Used     Tobacco comment: clove cigarettes   Substance Use Topics     Alcohol use: Yes     Frequency: Monthly or less     Drinks per session: 1 or 2     Comment: 1-2 per year     Family History   Problem Relation Age of Onset     Alzheimer Disease Mother      Other - See Comments Father         Old age 93     Alzheimer Disease Father       Pancreatic Cancer Sister      Other - See Comments Brother         Heroin OD     History   Drug Use No         Objective     /78 (BP Location: Right arm, Patient Position: Sitting, Cuff Size: Adult Regular)   Pulse 98   Temp 98.4  F (36.9  C) (Tympanic)   Resp 18   Wt 87.9 kg (193 lb 12.8 oz)   SpO2 100%   Breastfeeding No   BMI 28.62 kg/m      Physical Exam    GENERAL APPEARANCE: healthy, alert and no distress     EYES: EOMI, PERRL     NECK: no adenopathy, no asymmetry, masses, or scars and thyroid normal to palpation, limited range of motion     RESP: lungs clear to auscultation - no rales, rhonchi or wheezes     CV: regular rates and rhythm, normal S1 S2, no S3 or S4 and no murmur, click or rub     ABDOMEN:  soft, nontender, no HSM or masses and bowel sounds normal     MS: inflammation in joints of hand R>L, limited range of motion in knees, hips, back and neck     SKIN: no suspicious lesions or rashes     NEURO: Normal tone, decreased strength due to pain, mentation intact and speech normal     PSYCH: mentation appears normal. and affect normal     LYMPHATICS: No cervical adenopathy    Recent Labs   Lab Test 11/19/20  1618 03/09/20  1020   HGB 13.6 13.4    337    140   POTASSIUM 3.9 4.0   CR 0.81 0.89      Diagnostics:  No labs were ordered during this visit.   No EKG required, no history of coronary heart disease, significant arrhythmia, peripheral arterial disease or other structural heart disease.   EKG 3/2020 was normal sinus rhythm.    Revised Cardiac Risk Index (RCRI):  The patient has the following serious cardiovascular risks for perioperative complications:   - No serious cardiac risks = 0 points     RCRI Interpretation: 0 points: Class I (very low risk - 0.4% complication rate)       Assessment & Plan   The proposed surgical procedure is considered INTERMEDIATE risk.    1. Preop general physical exam     Risks and Recommendations:  The patient has the following additional  risks and recommendations for perioperative complications:   - No identified additional risk factors other than previously addressed    Medication Instructions:  Patient is to take all scheduled medications on the day of surgery    RECOMMENDATION:  APPROVAL GIVEN to proceed with proposed procedure, without further diagnostic evaluation.    Signed Electronically by: Michaelle Simon MD    Copy of this evaluation report is provided to requesting physician.    Preop [facility] Preop Guidelines    Revised Cardiac Risk Index

## 2020-12-04 NOTE — H&P (VIEW-ONLY)
[facility] AND [facility]  [address]  [place] [address]  Phone: [phone]  Fax: [phone]  Primary Provider: Jason Koch  Pre-op Performing Provider: AZUCENA VASQUEZ    PREOPERATIVE EVALUATION:  Today's date: 12/4/2020    Lilian Craft is a 55 year old female who presents for a preoperative evaluation.    Surgical Information:  Surgery/Procedure: first MPJ fusion, second head resection  Surgery Location: [facility]  Surgeon: Dr. Parish   Surgery Date: 12/18/2020  Where patient plans to recover: At home with family  Fax number for surgical facility: Note does not need to be faxed, will be available electronically in Epic.    Type of Anesthesia Anticipated: Local with MAC    Subjective     HPI related to upcoming procedure: History of left foot bunion, confirmed by XR. She has had a pain associated with this for quite some time.  She had an orthopedic consult who agrees that surgical intervention would be appropriate.     Preop Questions 12/4/2020   1. Have you ever had a heart attack or stroke? No   2. Have you ever had surgery on your heart or blood vessels, such as a stent placement, a coronary artery bypass, or surgery on an artery in your head, neck, heart, or legs? No   3. Do you have chest pain with activity? No   4. Do you have a history of  heart failure? No   5. Do you currently have a cold, bronchitis or symptoms of other infection? No   6. Do you have a cough, shortness of breath, or wheezing? No   7. Do you or anyone in your family have previous history of blood clots? No   8. Do you or does anyone in your family have a serious bleeding problem such as prolonged bleeding following surgeries or cuts? No   9. Have you ever had problems with anemia or been told to take iron pills? No   10. Have you had any abnormal blood loss such as black, tarry or bloody stools, or abnormal vaginal bleeding? YES - hx of PUD, which resolved with abstinence from alcohol   11. Have you  ever had a blood transfusion? No   12. Are you willing to have a blood transfusion if it is medically needed before, during, or after your surgery? Yes   13. Have you or any of your relatives ever had problems with anesthesia? No   14. Do you have sleep apnea, excessive snoring or daytime drowsiness? No   15. Do you have any artifical heart valves or other implanted medical devices like a pacemaker, defibrillator, or continuous glucose monitor? No   16. Do you have artificial joints? No   17. Are you allergic to latex? No   18. Is there any chance that you may be pregnant? No     Health Care Directive:  Patient has a Health Care Directive on file    Preoperative Review of :   reviewed - controlled substances reflected in medication list.    Status of Chronic Conditions:  See problem list for active medical problems.  Problems all longstanding and stable, except as noted/documented.  See ROS for pertinent symptoms related to these conditions.    Patient has a history of migraines, which she is being treated with Topamax daily, and Imitrex as needed.  She also has a history of cervical and lumbar osteoarthritis in addition to knee osteoarthritis.  She has a history of alcoholism in remission.    Review of Systems  Constitutional, HEENT, cardiovascular, pulmonary, GI and  systems are negative, except as otherwise noted.    Patient Active Problem List    Diagnosis Date Noted     Advanced care planning/counseling discussion 11/19/2020     Priority: Medium     Osteoarthritis of spine with radiculopathy, cervical region 11/19/2020     Priority: Medium     Osteoarthritis of spine with radiculopathy, lumbar region 11/19/2020     Priority: Medium     Hypercholesterolemia 11/19/2020     Priority: Medium     Metatarsalgia of left foot 11/16/2020     Priority: Medium     Added automatically from request for surgery [id number]       Hallux rigidus of left foot 11/16/2020     Priority: Medium     Added automatically from  request for surgery [id number]       Primary osteoarthritis of right knee 10/30/2020     Priority: Medium     Intractable chronic migraine without aura and without status migrainosus 10/30/2020     Priority: Medium     Bunion, left 10/30/2020     Priority: Medium     Primary osteoarthritis involving multiple joints 2019     Priority: Medium     H/O bee sting allergy      Priority: Medium     Borderline hypertension      Priority: Medium      Past Medical History:   Diagnosis Date     Borderline hypertension      H/O bee sting allergy      Osteoarthritis      PUD (peptic ulcer disease)      Seasonal allergies      No past surgical history on file.  Current Outpatient Medications   Medication Sig Dispense Refill     bisacodyl (DULCOLAX) 5 MG EC tablet Take as directed by colonoscopy prep instructions 2 tablet 0     butalbital-acetaminophen-caffeine (ESGIC) -40 MG tablet Take 1 tablet by mouth every 4 hours as needed for headaches 30 tablet 0     EPINEPHrine (EPIPEN/ADRENACLICK/OR ANY BX GENERIC EQUIV) 0.3 MG/0.3ML injection 2-pack Inject 0.3 mLs (0.3 mg) into the muscle as needed for anaphylaxis 1 mL 3     topiramate (TOPAMAX) 25 MG tablet Take 1 tablet (25 mg) by mouth daily for 30 doses 30 tablet 0       Allergies   Allergen Reactions     Bee Venom Swelling     Nsaids Other (See Comments)     Bleeding ulcers, tar stools        Social History     Tobacco Use     Smoking status: Current Every Day Smoker     Packs/day: 0.50     Years: 20.00     Pack years: 10.00     Types: Cigarettes     Last attempt to quit: 3/18/2018     Years since quittin.7     Smokeless tobacco: Never Used     Tobacco comment: clove cigarettes   Substance Use Topics     Alcohol use: Yes     Frequency: Monthly or less     Drinks per session: 1 or 2     Comment: 1-2 per year     Family History   Problem Relation Age of Onset     Alzheimer Disease Mother      Other - See Comments Father         Old age 93     Alzheimer Disease Father       Pancreatic Cancer Sister      Other - See Comments Brother         Heroin OD     History   Drug Use No         Objective     /78 (BP Location: Right arm, Patient Position: Sitting, Cuff Size: Adult Regular)   Pulse 98   Temp 98.4  F (36.9  C) (Tympanic)   Resp 18   Wt 87.9 kg (193 lb 12.8 oz)   SpO2 100%   Breastfeeding No   BMI 28.62 kg/m      Physical Exam    GENERAL APPEARANCE: healthy, alert and no distress     EYES: EOMI, PERRL     NECK: no adenopathy, no asymmetry, masses, or scars and thyroid normal to palpation, limited range of motion     RESP: lungs clear to auscultation - no rales, rhonchi or wheezes     CV: regular rates and rhythm, normal S1 S2, no S3 or S4 and no murmur, click or rub     ABDOMEN:  soft, nontender, no HSM or masses and bowel sounds normal     MS: inflammation in joints of hand R>L, limited range of motion in knees, hips, back and neck     SKIN: no suspicious lesions or rashes     NEURO: Normal tone, decreased strength due to pain, mentation intact and speech normal     PSYCH: mentation appears normal. and affect normal     LYMPHATICS: No cervical adenopathy    Recent Labs   Lab Test 11/19/20  1618 03/09/20  1020   HGB 13.6 13.4    337    140   POTASSIUM 3.9 4.0   CR 0.81 0.89      Diagnostics:  No labs were ordered during this visit.   No EKG required, no history of coronary heart disease, significant arrhythmia, peripheral arterial disease or other structural heart disease.   EKG 3/2020 was normal sinus rhythm.    Revised Cardiac Risk Index (RCRI):  The patient has the following serious cardiovascular risks for perioperative complications:   - No serious cardiac risks = 0 points     RCRI Interpretation: 0 points: Class I (very low risk - 0.4% complication rate)       Assessment & Plan   The proposed surgical procedure is considered INTERMEDIATE risk.    1. Preop general physical exam     Risks and Recommendations:  The patient has the following additional  risks and recommendations for perioperative complications:   - No identified additional risk factors other than previously addressed    Medication Instructions:  Patient is to take all scheduled medications on the day of surgery    RECOMMENDATION:  APPROVAL GIVEN to proceed with proposed procedure, without further diagnostic evaluation.    Signed Electronically by: Michaelle Simon MD    Copy of this evaluation report is provided to requesting physician.    Preop [facility] Preop Guidelines    Revised Cardiac Risk Index

## 2020-12-04 NOTE — PATIENT INSTRUCTIONS

## 2020-12-05 NOTE — PROGRESS NOTES
Nursing Notes:   Vero Garcia LPN  12/4/2020  1:10 PM  Signed  Patient presents to clinic on preop appointment for L foot metatarsalgia. Surgery with Dr Parish scheduled for 12/18/2020. Patient also has a colonoscopy on 12/15/2020 with Dr. Young.  Chief Complaint   Patient presents with     Pre-Op Exam & Follow up Migraines     Vero Garcia LPN       SUBJECTIVE:  HPI: Lilian Craft is a 55 year old female here to follow-up with her migraines, and neck and low back pain in addition to her preop exam. Additionally she complains of dry eyes and would like treatment.    See separate preop note.    # Migraines: Topamax 25 mg was started at her last visit for migraine prophylaxis.  She has noticed some improvement in the frequency of her migraines, though she continues to have migraines regularly.  She denies any adverse side effects to the medication.  Additionally, she continues to get relief from Imitrex for her acute migraines, though was only given 9/month from her insurance company.  Her migraines are also triggered by her dry eyes in addition to her neck pain.     # DJD: Pain in her neck is reported 4 out of 10.  She said that her neck cracks and snaps occasionally.  She also notes additional left arm numbness that has been off and on for 1 year.  She also notes left-sided weakness occasionally. MRIs of her lumbar spine and cervical spine were reviewed with patient. She notes significant bilateral upper and lower extremity radiculopathy.  MRIs reveal spinal stenosis of the cervical and lumbar spine.  Patient is interested in surgical referral.  She cannot tolerate NSAIDs.     # Dry eyes: Patient reports that she was recently seen at her eye doctor and diagnosed with dry eyes.  She was advised to try over-the-counter moisturizing eyedrops, which she has done and she feels like it is making her condition worse.  She is wondering if I can prescribe something for dry eyes.  No history of  Sjogren's.    Allergies:  Allergies   Allergen Reactions     Bee Venom Swelling     Nsaids Other (See Comments)     Bleeding ulcers, tar stools     ROS:  Constitutional, HEENT, cardiovascular, pulmonary, GI and  systems are negative, except as otherwise noted.    Past medical, surgical, and family history reviewed and updated as appropriate in the chart.  Relevant social history listed in HPI.    OBJECTIVE:  /78 (BP Location: Right arm, Patient Position: Sitting, Cuff Size: Adult Regular)   Pulse 98   Temp 98.4  F (36.9  C) (Tympanic)   Resp 18   Wt 87.9 kg (193 lb 12.8 oz)   SpO2 100%   Breastfeeding No   BMI 28.62 kg/m      EXAM:    GENERAL APPEARANCE: healthy, alert and no distress     EYES: EOMI, PERRL     NECK: no adenopathy, no asymmetry, masses, or scars and thyroid normal to palpation, limited range of motion     RESP: lungs clear to auscultation - no rales, rhonchi or wheezes     CV: regular rates and rhythm, normal S1 S2, no S3 or S4 and no murmur, click or rub     ABDOMEN:  soft, nontender, no HSM or masses and bowel sounds normal     MS: inflammation in joints of hand R>L, limited range of motion in knees, hips, back and neck     SKIN: no suspicious lesions or rashes     NEURO: Normal tone, decreased strength due to pain, mentation intact and speech normal     PSYCH: mentation appears normal. and affect normal     LYMPHATICS: No cervical adenopathy      MR LUMBAR SPINE W/O CONTRAST   Diffuse asymmetric degenerative changes of the lumbar spine associated  with mild leftward lower lumbar coronal curvature. No severe spinal  stenosis. Foraminal stenoses are most pronounced on the right at L3-4.  Subarticular zone narrowing is most pronounced bilaterally at L4-5.    MR CERVICAL SPINE W/O CONTRAST  Fairly advanced diffuse degenerative changes of cervical spine, with  Modic type II edema at C5-6 and C7-T1. Moderate spinal stenosis at  C5-6 and C6-7. Multifocal high-grade foraminal stenoses,  most  pronounced on the left at C5-6.      ASSESSEMENT AND PLAN:    1. Preop general physical exam  -See preop note    2. Intractable chronic migraine without aura and without status migrainosus  - butalbital-acetaminophen-caffeine (ESGIC) -40 MG tablet; Take 1 tablet by mouth every 4 hours as needed for headaches  Dispense: 30 tablet; Refill: 0  - topiramate (TOPAMAX) 50 MG tablet; Take 1 tablet (50 mg) by mouth daily  Dispense: 90 tablet; Refill: 1  -If patient's migraines fail to respond to Topamax I would then consider an injectable prophylactic medication  -Additionally C-spine surgery may improve her migraines as well.    3. Spinal stenosis in cervical region  4. Spinal stenosis of lumbar region with radiculopathy  -Patient has done physical therapy in the past, and due to ongoing pain she would like to pursue surgical intervention of both her neck and her low back  - Orthopedic & Spine  Referral; Future    5. Dry eyes  - cycloSPORINE (RESTASIS) 0.05 % ophthalmic emulsion; Place 1 drop into both eyes 2 times daily  Dispense: 5.5 mL; Refill: 0  -Additionally I encouraged patient to follow-up with her ophthalmologist    6.  Paperwork completed for disability request     The majority of the 25 minute patient visit was spent counseling the patient on the above issue/s, separate from the preop physical.    Michaelle Simon MD  [facility] AND [facility]    Portions of this dictation were created using the Dragon Nuance voice recognition system. Proofreading was completed but there may be errors in text.

## 2020-12-05 NOTE — H&P (VIEW-ONLY)
Nursing Notes:   Vero Garcia LPN  12/4/2020  1:10 PM  Signed  Patient presents to clinic on preop appointment for L foot metatarsalgia. Surgery with Dr Parish scheduled for 12/18/2020. Patient also has a colonoscopy on 12/15/2020 with Dr. Young.  Chief Complaint   Patient presents with     Pre-Op Exam & Follow up Migraines     Vero Garcia LPN       SUBJECTIVE:  HPI: Lilian Craft is a 55 year old female here to follow-up with her migraines, and neck and low back pain in addition to her preop exam. Additionally she complains of dry eyes and would like treatment.    See separate preop note.    # Migraines: Topamax 25 mg was started at her last visit for migraine prophylaxis.  She has noticed some improvement in the frequency of her migraines, though she continues to have migraines regularly.  She denies any adverse side effects to the medication.  Additionally, she continues to get relief from Imitrex for her acute migraines, though was only given 9/month from her insurance company.  Her migraines are also triggered by her dry eyes in addition to her neck pain.     # DJD: Pain in her neck is reported 4 out of 10.  She said that her neck cracks and snaps occasionally.  She also notes additional left arm numbness that has been off and on for 1 year.  She also notes left-sided weakness occasionally. MRIs of her lumbar spine and cervical spine were reviewed with patient. She notes significant bilateral upper and lower extremity radiculopathy.  MRIs reveal spinal stenosis of the cervical and lumbar spine.  Patient is interested in surgical referral.  She cannot tolerate NSAIDs.     # Dry eyes: Patient reports that she was recently seen at her eye doctor and diagnosed with dry eyes.  She was advised to try over-the-counter moisturizing eyedrops, which she has done and she feels like it is making her condition worse.  She is wondering if I can prescribe something for dry eyes.  No history of  Sjogren's.    Allergies:  Allergies   Allergen Reactions     Bee Venom Swelling     Nsaids Other (See Comments)     Bleeding ulcers, tar stools     ROS:  Constitutional, HEENT, cardiovascular, pulmonary, GI and  systems are negative, except as otherwise noted.    Past medical, surgical, and family history reviewed and updated as appropriate in the chart.  Relevant social history listed in HPI.    OBJECTIVE:  /78 (BP Location: Right arm, Patient Position: Sitting, Cuff Size: Adult Regular)   Pulse 98   Temp 98.4  F (36.9  C) (Tympanic)   Resp 18   Wt 87.9 kg (193 lb 12.8 oz)   SpO2 100%   Breastfeeding No   BMI 28.62 kg/m      EXAM:    GENERAL APPEARANCE: healthy, alert and no distress     EYES: EOMI, PERRL     NECK: no adenopathy, no asymmetry, masses, or scars and thyroid normal to palpation, limited range of motion     RESP: lungs clear to auscultation - no rales, rhonchi or wheezes     CV: regular rates and rhythm, normal S1 S2, no S3 or S4 and no murmur, click or rub     ABDOMEN:  soft, nontender, no HSM or masses and bowel sounds normal     MS: inflammation in joints of hand R>L, limited range of motion in knees, hips, back and neck     SKIN: no suspicious lesions or rashes     NEURO: Normal tone, decreased strength due to pain, mentation intact and speech normal     PSYCH: mentation appears normal. and affect normal     LYMPHATICS: No cervical adenopathy      MR LUMBAR SPINE W/O CONTRAST   Diffuse asymmetric degenerative changes of the lumbar spine associated  with mild leftward lower lumbar coronal curvature. No severe spinal  stenosis. Foraminal stenoses are most pronounced on the right at L3-4.  Subarticular zone narrowing is most pronounced bilaterally at L4-5.    MR CERVICAL SPINE W/O CONTRAST  Fairly advanced diffuse degenerative changes of cervical spine, with  Modic type II edema at C5-6 and C7-T1. Moderate spinal stenosis at  C5-6 and C6-7. Multifocal high-grade foraminal stenoses,  most  pronounced on the left at C5-6.      ASSESSEMENT AND PLAN:    1. Preop general physical exam  -See preop note    2. Intractable chronic migraine without aura and without status migrainosus  - butalbital-acetaminophen-caffeine (ESGIC) -40 MG tablet; Take 1 tablet by mouth every 4 hours as needed for headaches  Dispense: 30 tablet; Refill: 0  - topiramate (TOPAMAX) 50 MG tablet; Take 1 tablet (50 mg) by mouth daily  Dispense: 90 tablet; Refill: 1  -If patient's migraines fail to respond to Topamax I would then consider an injectable prophylactic medication  -Additionally C-spine surgery may improve her migraines as well.    3. Spinal stenosis in cervical region  4. Spinal stenosis of lumbar region with radiculopathy  -Patient has done physical therapy in the past, and due to ongoing pain she would like to pursue surgical intervention of both her neck and her low back  - Orthopedic & Spine  Referral; Future    5. Dry eyes  - cycloSPORINE (RESTASIS) 0.05 % ophthalmic emulsion; Place 1 drop into both eyes 2 times daily  Dispense: 5.5 mL; Refill: 0  -Additionally I encouraged patient to follow-up with her ophthalmologist    6.  Paperwork completed for disability request     The majority of the 25 minute patient visit was spent counseling the patient on the above issue/s, separate from the preop physical.    Michaelle Simon MD  [facility] AND hospitals    Portions of this dictation were created using the Dragon Nuance voice recognition system. Proofreading was completed but there may be errors in text.

## 2020-12-08 ENCOUNTER — TELEPHONE (OUTPATIENT)
Dept: FAMILY MEDICINE | Facility: OTHER | Age: 55
End: 2020-12-08

## 2020-12-08 DIAGNOSIS — H04.123 DRY EYES: Primary | ICD-10-CM

## 2020-12-08 RX ORDER — TOPIRAMATE 25 MG/1
TABLET, FILM COATED ORAL
COMMUNITY
Start: 2020-11-19 | End: 2020-12-08

## 2020-12-08 NOTE — TELEPHONE ENCOUNTER
Called and verified patient full name and  with Rob. States that she is wondering if patient has tried artifical tears- this is first line treatment. And/or if she has had other treatment why Restatis is prescribed.     Kimmy Martinez LPN on 2020 at 4:19 PM

## 2020-12-09 NOTE — TELEPHONE ENCOUNTER
Rob from [facility] said if the doctor orders moisturizing eye drops it will be covered by her insurance. Lora Ziegler LPN ....................12/9/2020  12:53 PM

## 2020-12-10 RX ORDER — PROPYLENE GLYCOL/PEG 400 0.3 %-0.4%
1 DROPS OPHTHALMIC (EYE)
Qty: 10 ML | Refills: 1 | Status: SHIPPED | OUTPATIENT
Start: 2020-12-10 | End: 2024-08-07

## 2020-12-10 NOTE — TELEPHONE ENCOUNTER
Eyedrop prescription has been written, sent to [facility] pharmacy.

## 2020-12-11 ENCOUNTER — ALLIED HEALTH/NURSE VISIT (OUTPATIENT)
Dept: FAMILY MEDICINE | Facility: OTHER | Age: 55
End: 2020-12-11
Attending: SURGERY
Payer: COMMERCIAL

## 2020-12-11 DIAGNOSIS — Z01.818 PRE-OP TESTING: ICD-10-CM

## 2020-12-11 PROCEDURE — U0003 INFECTIOUS AGENT DETECTION BY NUCLEIC ACID (DNA OR RNA); SEVERE ACUTE RESPIRATORY SYNDROME CORONAVIRUS 2 (SARS-COV-2) (CORONAVIRUS DISEASE [COVID-19]), AMPLIFIED PROBE TECHNIQUE, MAKING USE OF HIGH THROUGHPUT TECHNOLOGIES AS DESCRIBED BY CMS-2020-01-R: HCPCS | Mod: ZL | Performed by: SURGERY

## 2020-12-11 PROCEDURE — C9803 HOPD COVID-19 SPEC COLLECT: HCPCS

## 2020-12-12 LAB
SARS-COV-2 RNA SPEC QL NAA+PROBE: NOT DETECTED
SPECIMEN SOURCE: NORMAL

## 2020-12-14 ENCOUNTER — ALLIED HEALTH/NURSE VISIT (OUTPATIENT)
Dept: FAMILY MEDICINE | Facility: OTHER | Age: 55
End: 2020-12-14
Attending: PODIATRIST
Payer: COMMERCIAL

## 2020-12-14 DIAGNOSIS — Z20.822 ENCOUNTER FOR LABORATORY TESTING FOR COVID-19 VIRUS: Primary | ICD-10-CM

## 2020-12-14 PROCEDURE — U0003 INFECTIOUS AGENT DETECTION BY NUCLEIC ACID (DNA OR RNA); SEVERE ACUTE RESPIRATORY SYNDROME CORONAVIRUS 2 (SARS-COV-2) (CORONAVIRUS DISEASE [COVID-19]), AMPLIFIED PROBE TECHNIQUE, MAKING USE OF HIGH THROUGHPUT TECHNOLOGIES AS DESCRIBED BY CMS-2020-01-R: HCPCS | Mod: ZL | Performed by: PODIATRIST

## 2020-12-14 PROCEDURE — C9803 HOPD COVID-19 SPEC COLLECT: HCPCS

## 2020-12-14 NOTE — PROGRESS NOTES
Patient is here for [facility] testing for surgery.  Althea Armstrong LPN on 12/14/2020 at 10:36 AM

## 2020-12-15 ENCOUNTER — ANESTHESIA (OUTPATIENT)
Dept: SURGERY | Facility: OTHER | Age: 55
End: 2020-12-15
Payer: COMMERCIAL

## 2020-12-15 ENCOUNTER — ANESTHESIA EVENT (OUTPATIENT)
Dept: SURGERY | Facility: OTHER | Age: 55
End: 2020-12-15
Payer: COMMERCIAL

## 2020-12-15 ENCOUNTER — HOSPITAL ENCOUNTER (OUTPATIENT)
Facility: OTHER | Age: 55
Discharge: HOME | End: 2020-12-15
Attending: SURGERY | Admitting: SURGERY
Payer: COMMERCIAL

## 2020-12-15 VITALS
DIASTOLIC BLOOD PRESSURE: 98 MMHG | RESPIRATION RATE: 14 BRPM | SYSTOLIC BLOOD PRESSURE: 148 MMHG | HEART RATE: 82 BPM | WEIGHT: 193 LBS | BODY MASS INDEX: 28.58 KG/M2 | HEIGHT: 69 IN | TEMPERATURE: 99.2 F | OXYGEN SATURATION: 99 %

## 2020-12-15 DIAGNOSIS — K63.5 POLYP OF COLON, UNSPECIFIED PART OF COLON, UNSPECIFIED TYPE: Primary | ICD-10-CM

## 2020-12-15 DIAGNOSIS — K64.8 INTERNAL HEMORRHOIDS: ICD-10-CM

## 2020-12-15 LAB
SARS-COV-2 RNA SPEC QL NAA+PROBE: NOT DETECTED
SPECIMEN SOURCE: NORMAL

## 2020-12-15 PROCEDURE — 250N000009 HC RX 250: Performed by: SURGERY

## 2020-12-15 PROCEDURE — 45385 COLONOSCOPY W/LESION REMOVAL: CPT | Performed by: NURSE ANESTHETIST, CERTIFIED REGISTERED

## 2020-12-15 PROCEDURE — 88305 TISSUE EXAM BY PATHOLOGIST: CPT

## 2020-12-15 PROCEDURE — 250N000009 HC RX 250: Performed by: NURSE ANESTHETIST, CERTIFIED REGISTERED

## 2020-12-15 PROCEDURE — 45385 COLONOSCOPY W/LESION REMOVAL: CPT | Mod: PT | Performed by: SURGERY

## 2020-12-15 PROCEDURE — 258N000003 HC RX IP 258 OP 636: Performed by: SURGERY

## 2020-12-15 PROCEDURE — 250N000011 HC RX IP 250 OP 636: Performed by: NURSE ANESTHETIST, CERTIFIED REGISTERED

## 2020-12-15 PROCEDURE — 45380 COLONOSCOPY AND BIOPSY: CPT | Performed by: SURGERY

## 2020-12-15 RX ORDER — NALOXONE HYDROCHLORIDE 0.4 MG/ML
0.2 INJECTION, SOLUTION INTRAMUSCULAR; INTRAVENOUS; SUBCUTANEOUS
Status: DISCONTINUED | OUTPATIENT
Start: 2020-12-15 | End: 2020-12-15 | Stop reason: HOSPADM

## 2020-12-15 RX ORDER — NALOXONE HYDROCHLORIDE 0.4 MG/ML
0.4 INJECTION, SOLUTION INTRAMUSCULAR; INTRAVENOUS; SUBCUTANEOUS
Status: DISCONTINUED | OUTPATIENT
Start: 2020-12-15 | End: 2020-12-15 | Stop reason: HOSPADM

## 2020-12-15 RX ORDER — SODIUM CHLORIDE, SODIUM LACTATE, POTASSIUM CHLORIDE, CALCIUM CHLORIDE 600; 310; 30; 20 MG/100ML; MG/100ML; MG/100ML; MG/100ML
INJECTION, SOLUTION INTRAVENOUS CONTINUOUS
Status: DISCONTINUED | OUTPATIENT
Start: 2020-12-15 | End: 2020-12-15 | Stop reason: HOSPADM

## 2020-12-15 RX ORDER — PROPOFOL 10 MG/ML
INJECTION, EMULSION INTRAVENOUS PRN
Status: DISCONTINUED | OUTPATIENT
Start: 2020-12-15 | End: 2020-12-15

## 2020-12-15 RX ORDER — LIDOCAINE HYDROCHLORIDE 20 MG/ML
INJECTION, SOLUTION INFILTRATION; PERINEURAL PRN
Status: DISCONTINUED | OUTPATIENT
Start: 2020-12-15 | End: 2020-12-15

## 2020-12-15 RX ORDER — FLUMAZENIL 0.1 MG/ML
0.2 INJECTION, SOLUTION INTRAVENOUS
Status: DISCONTINUED | OUTPATIENT
Start: 2020-12-15 | End: 2020-12-15 | Stop reason: HOSPADM

## 2020-12-15 RX ORDER — LIDOCAINE 40 MG/G
CREAM TOPICAL
Status: DISCONTINUED | OUTPATIENT
Start: 2020-12-15 | End: 2020-12-15 | Stop reason: HOSPADM

## 2020-12-15 RX ORDER — PROPOFOL 10 MG/ML
INJECTION, EMULSION INTRAVENOUS CONTINUOUS PRN
Status: DISCONTINUED | OUTPATIENT
Start: 2020-12-15 | End: 2020-12-15

## 2020-12-15 RX ADMIN — LIDOCAINE HYDROCHLORIDE 0.1 ML: 10 INJECTION, SOLUTION EPIDURAL; INFILTRATION; INTRACAUDAL; PERINEURAL at 11:37

## 2020-12-15 RX ADMIN — LIDOCAINE HYDROCHLORIDE 40 MG: 20 INJECTION, SOLUTION INFILTRATION; PERINEURAL at 11:47

## 2020-12-15 RX ADMIN — PROPOFOL 50 MG: 10 INJECTION, EMULSION INTRAVENOUS at 11:50

## 2020-12-15 RX ADMIN — PROPOFOL 50 MG: 10 INJECTION, EMULSION INTRAVENOUS at 11:47

## 2020-12-15 RX ADMIN — SODIUM CHLORIDE, POTASSIUM CHLORIDE, SODIUM LACTATE AND CALCIUM CHLORIDE 30 ML/HR: 600; 310; 30; 20 INJECTION, SOLUTION INTRAVENOUS at 11:34

## 2020-12-15 RX ADMIN — PROPOFOL 140 MCG/KG/MIN: 10 INJECTION, EMULSION INTRAVENOUS at 11:47

## 2020-12-15 SDOH — HEALTH STABILITY: MENTAL HEALTH: CURRENT SMOKER: 1

## 2020-12-15 ASSESSMENT — MIFFLIN-ST. JEOR: SCORE: 1534.82

## 2020-12-15 ASSESSMENT — LIFESTYLE VARIABLES: TOBACCO_USE: 1

## 2020-12-15 NOTE — ANESTHESIA CARE TRANSFER NOTE
Patient: Lilian Craft    Procedure(s):  COLONOSCOPY, WITH POLYPECTOMY    Diagnosis: Encounter for screening colonoscopy [Z12.11]  Diagnosis Additional Information: No value filed.    Anesthesia Type:   MAC     Note:  Airway :Room Air  Patient transferred to:Phase II  Handoff Report: Identifed the Patient, Identified the Reponsible Provider, Reviewed the pertinent medical history, Discussed the surgical course, Reviewed Intra-OP anesthesia mangement and issues during anesthesia, Set expectations for post-procedure period and Allowed opportunity for questions and acknowledgement of understanding      Vitals: (Last set prior to Anesthesia Care Transfer)    CRNA VITALS  12/15/2020 1139 - 12/15/2020 1210      12/15/2020             Resp Rate (set):  10                Electronically Signed By: ALEENA RIVERA CRNA  December 15, 2020  12:10 PM

## 2020-12-15 NOTE — INTERVAL H&P NOTE
I saw and examined Lilian Craft.  I have reviewed the history and physical and find no changes to the patient's medical status or condition with the exceptions noted below.   Lilian Craft denies family history of colon cancer. Patient denies change in bowel habits or blood in stools. No previous colonoscopy.   The technical details of colonoscopy were discussed with the patient along with the risks and benefits to include bleeding, perforation and incomplete study. Lilian Craft demonstrated understanding and is willing to proceed.       Varinder Gomez MD   11:29 AM 12/15/2020

## 2020-12-15 NOTE — DISCHARGE INSTRUCTIONS
[facility]  Adult Discharge Orders & Instructions    ________________________________________________________________          For 12 hours after surgery  1. Get plenty of rest.  A responsible adult must stay with you for at least 12 hours after you leave the hospital.   2. You may feel lightheaded.  IF so, sit for a few minutes before standing.  Have someone help you get up.   3. You may have a slight fever. Call the doctor if your fever is over 101 F (38.3 C) (taken under the tongue) or lasts longer than 24 hours.  4. You may have a dry mouth, a sore throat, muscle aches or trouble sleeping.  These should go away after 24 hours.  5. Do not make important or legal decisions.  6.   Do not drive or use heavy equipment.  If you have weakness or tingling, don't drive or use heavy equipment until this feeling goes away.    To contact a doctor, call   [phone]_______________________

## 2020-12-15 NOTE — ANESTHESIA PREPROCEDURE EVALUATION
Anesthesia Pre-Procedure Evaluation    Patient: Lilian Craft   MRN: [id number] : 1965          Preoperative Diagnosis: Encounter for screening colonoscopy [Z12.11]    Procedure(s):  COLONOSCOPY    Past Medical History:   Diagnosis Date     Borderline hypertension      H/O bee sting allergy      Osteoarthritis      PUD (peptic ulcer disease)      Seasonal allergies      History reviewed. No pertinent surgical history.    Anesthesia Evaluation     . Pt has not had prior anesthetic            ROS/MED HX    ENT/Pulmonary:     (+)tobacco use, Current use 1/4 ppd packs/day  , . .    Neurologic:     (+)migraines,     Cardiovascular:     (+) Dyslipidemia, ----. : . . . :. .       METS/Exercise Tolerance:  4 - Raking leaves, gardening   Hematologic:  - neg hematologic  ROS       Musculoskeletal:   (+) arthritis,  -       GI/Hepatic:     (+) Other GI/Hepatic hx of PUD      Renal/Genitourinary:  - ROS Renal section negative       Endo:  - neg endo ROS       Psychiatric:         Infectious Disease:  - neg infectious disease ROS       Malignancy:      - no malignancy   Other:    - neg other ROS                      Physical Exam  Normal systems: pulmonary and dental    Airway   Mallampati: I  TM distance: >3 FB  Neck ROM: limited  Comment: Pt has adequate neck extension but states she needs surgery    Dental     Cardiovascular   Rhythm and rate: regular and normal      Pulmonary             Lab Results   Component Value Date    WBC 9.9 2020    HGB 13.6 2020    HCT 42.7 2020     2020    CRP 0.1 2020    SED 12 2019     2020    POTASSIUM 3.9 2020    CHLORIDE 102 2020    CO2 30 2020    BUN 16 2020    CR 0.81 2020    GLC 95 2020    ANNIE 10.3 2020    MAG 1.9 2020    ALBUMIN 4.8 2020    PROTTOTAL 8.2 2020    ALT 32 2020    AST 16 2020    ALKPHOS 66 2020    BILITOTAL 0.4 2020    LIPASE 15  "03/09/2020       Preop Vitals  BP Readings from Last 3 Encounters:   12/04/20 136/78   11/19/20 (!) 140/82   11/12/20 (!) 144/90    Pulse Readings from Last 3 Encounters:   12/04/20 98   11/19/20 94   11/12/20 84      Resp Readings from Last 3 Encounters:   12/04/20 18   11/19/20 18   11/05/20 16    SpO2 Readings from Last 3 Encounters:   12/04/20 100%   11/19/20 99%   11/05/20 98%      Temp Readings from Last 1 Encounters:   12/04/20 98.4  F (36.9  C) (Tympanic)    Ht Readings from Last 1 Encounters:   11/19/20 1.753 m (5' 9\")      Wt Readings from Last 1 Encounters:   12/04/20 87.9 kg (193 lb 12.8 oz)    Estimated body mass index is 28.62 kg/m  as calculated from the following:    Height as of 11/19/20: 1.753 m (5' 9\").    Weight as of 12/4/20: 87.9 kg (193 lb 12.8 oz).       Anesthesia Plan      History & Physical Review      ASA Status:  2 .    NPO Status:  > 2 hours    Plan for MAC with Propofol induction.     Pt has a migraine starting today.  She states she has nausea from her migraines.  She is feeling ok at this point.  I offered antinausea and pain medication for her migraine after the procedure.  Will hydrate liberally with IV fluids.  She states her allergy to NSAIDS is d/t PUD only.   The patient is a current Smoker, Patient was instructed to abstain from smoking on day of procedure and patient smoked on day of surgery     Postoperative Care      Consents  Anesthetic plan, risks, benefits and alternatives discussed with:  Patient..                 ALEENA RIVERA CRNA  "

## 2020-12-15 NOTE — OP NOTE
COLONOSCOPY PROCEDURE NOTE    DATE OF SERVICE: 12/15/2020    SURGEON: ERIC Gomez MD     PRE-OP DIAGNOSIS:    Healthcare maintenance     POST-OP DIAGNOSIS:    Internal hemorrhoids  Polyps at descending colon and sigmoid colon     PROCEDURE:   Colonoscopy with snare polypectomy    ASSISTANT:  Circulator: Lizy Pulido RN  Scrub Person: LeathaJeannie  Pre-Op Nurse: Radhika Murry RN    ANESTHESIA:  MAC                            Monitor Anesthesia CareCRNA Independent: Crystal Best APRN CRNA    INDICATION FOR THE PROCEDURE: The patient is a 55 year old female with average risk for colon cancer in need of screening colonoscopy. The patient has no other complaints.  After explaining the risks to include bleeding, perforation, potential inability to reach the cecum the patient wishes to proceed.    PROCEDURE: After adequate sedation, the patient was in the left lateral decubitus position.  Rectal exam was performed.  There was normal tone and no palpable masses.  The colonoscope was introduced into the rectum and advanced to the cecum with Moderate difficulty.  The patient's prep was good.  The terminal cecum was reached.  The cecum, ascending, transverse, descending and sigmoid colon were significant for one 3-4mm polyp in the proximal descending colon removed with hot snare and one 5-6mm polyp in the sigmoid colon removed with hot snare.  The scope was retroflexed in the rectum.  The anorectal junction was remarkable for likely grade I-II internal hemorrhoids.  The scope was straightened and removed.  The patient tolerated the procedure well.     ESTIMATED BLOOD LOSS: none    COMPLICATIONS:  None    TISSUE REMOVED:  Yes    RECOMMEND:    Follow-up pending pathology      ERIC Gomez MD

## 2020-12-15 NOTE — ANESTHESIA POSTPROCEDURE EVALUATION
Patient: Lilian Craft    Procedure(s):  COLONOSCOPY, WITH POLYPECTOMY    Diagnosis:Encounter for screening colonoscopy [Z12.11]  Diagnosis Additional Information: No value filed.    Anesthesia Type:  MAC    Note:  Anesthesia Post Evaluation    Patient location during evaluation: Phase 2  Patient participation: Able to fully participate in evaluation  Level of consciousness: awake and alert  Pain management: adequate  Airway patency: patent  Cardiovascular status: acceptable  Respiratory status: acceptable  Hydration status: acceptable  PONV: none             Last vitals:  Vitals:    12/15/20 1116 12/15/20 1216   BP: (!) 147/107 126/88   Pulse: 90 78   Resp: 12 12   Temp: 99.2  F (37.3  C)    SpO2: 98% 99%         Electronically Signed By: ALEENA RIVERA CRNA  December 15, 2020  12:49 PM

## 2020-12-16 ENCOUNTER — HOSPITAL ENCOUNTER (OUTPATIENT)
Dept: MAMMOGRAPHY | Facility: OTHER | Age: 55
Discharge: HOME | End: 2020-12-16
Attending: FAMILY MEDICINE | Admitting: FAMILY MEDICINE
Payer: COMMERCIAL

## 2020-12-16 DIAGNOSIS — R92.30 DENSE BREAST TISSUE: ICD-10-CM

## 2020-12-16 DIAGNOSIS — Z12.31 ENCOUNTER FOR SCREENING MAMMOGRAM FOR BREAST CANCER: ICD-10-CM

## 2020-12-16 PROCEDURE — 77063 BREAST TOMOSYNTHESIS BI: CPT

## 2020-12-17 ENCOUNTER — ANESTHESIA EVENT (OUTPATIENT)
Dept: SURGERY | Facility: OTHER | Age: 55
End: 2020-12-17
Payer: COMMERCIAL

## 2020-12-18 ENCOUNTER — HOSPITAL ENCOUNTER (OUTPATIENT)
Facility: OTHER | Age: 55
Discharge: HOME | End: 2020-12-18
Attending: PODIATRIST | Admitting: PODIATRIST
Payer: COMMERCIAL

## 2020-12-18 ENCOUNTER — HOSPITAL ENCOUNTER (OUTPATIENT)
Dept: GENERAL RADIOLOGY | Facility: OTHER | Age: 55
End: 2020-12-18
Attending: PODIATRIST
Payer: COMMERCIAL

## 2020-12-18 ENCOUNTER — ANESTHESIA (OUTPATIENT)
Dept: SURGERY | Facility: OTHER | Age: 55
End: 2020-12-18
Payer: COMMERCIAL

## 2020-12-18 VITALS
TEMPERATURE: 99 F | DIASTOLIC BLOOD PRESSURE: 91 MMHG | OXYGEN SATURATION: 100 % | SYSTOLIC BLOOD PRESSURE: 127 MMHG | HEIGHT: 69 IN | HEART RATE: 80 BPM | BODY MASS INDEX: 28.58 KG/M2 | WEIGHT: 193 LBS | RESPIRATION RATE: 16 BRPM

## 2020-12-18 DIAGNOSIS — G89.18 POST-OP PAIN: Primary | ICD-10-CM

## 2020-12-18 DIAGNOSIS — M77.42 METATARSALGIA OF LEFT FOOT: ICD-10-CM

## 2020-12-18 DIAGNOSIS — M20.22 HALLUX RIGIDUS OF LEFT FOOT: ICD-10-CM

## 2020-12-18 DIAGNOSIS — S92.309A METATARSAL BONE FRACTURE: ICD-10-CM

## 2020-12-18 PROCEDURE — 258N000003 HC RX IP 258 OP 636: Performed by: NURSE ANESTHETIST, CERTIFIED REGISTERED

## 2020-12-18 PROCEDURE — 28122 PARTIAL REMOVAL OF FOOT BONE: CPT | Mod: AS | Performed by: PHYSICIAN ASSISTANT

## 2020-12-18 PROCEDURE — 28750 FUSION OF BIG TOE JOINT: CPT | Performed by: NURSE ANESTHETIST, CERTIFIED REGISTERED

## 2020-12-18 PROCEDURE — 360N000021 HC SURGERY LEVEL 3 EA 15 ADDTL MIN: Performed by: PODIATRIST

## 2020-12-18 PROCEDURE — 250N000013 HC RX MED GY IP 250 OP 250 PS 637: Performed by: PODIATRIST

## 2020-12-18 PROCEDURE — 761N000007 HC RECOVERY PHASE 2 EACH 15 MINS: Performed by: PODIATRIST

## 2020-12-18 PROCEDURE — 250N000009 HC RX 250: Performed by: NURSE ANESTHETIST, CERTIFIED REGISTERED

## 2020-12-18 PROCEDURE — 999N000136 HC STATISTIC PRE PROC ASSESS II: Performed by: PODIATRIST

## 2020-12-18 PROCEDURE — C1769 GUIDE WIRE: HCPCS | Performed by: PODIATRIST

## 2020-12-18 PROCEDURE — C1713 ANCHOR/SCREW BN/BN,TIS/BN: HCPCS | Performed by: PODIATRIST

## 2020-12-18 PROCEDURE — 370N000002 HC ANESTHESIA TECHNICAL FEE, EACH ADDTL 15 MIN: Performed by: PODIATRIST

## 2020-12-18 PROCEDURE — 370N000001 HC ANESTHESIA TECHNICAL FEE, 1ST 30 MIN: Performed by: PODIATRIST

## 2020-12-18 PROCEDURE — 272N000001 HC OR GENERAL SUPPLY STERILE: Performed by: PODIATRIST

## 2020-12-18 PROCEDURE — 250N000011 HC RX IP 250 OP 636: Performed by: NURSE ANESTHETIST, CERTIFIED REGISTERED

## 2020-12-18 PROCEDURE — 250N000009 HC RX 250: Performed by: PODIATRIST

## 2020-12-18 PROCEDURE — 28750 FUSION OF BIG TOE JOINT: CPT | Mod: TA | Performed by: PODIATRIST

## 2020-12-18 PROCEDURE — 250N000011 HC RX IP 250 OP 636: Performed by: PODIATRIST

## 2020-12-18 PROCEDURE — 360N000024 HC SURGERY LEVEL 3 W FLUORO 1ST 30 MIN: Performed by: PODIATRIST

## 2020-12-18 PROCEDURE — 28122 PARTIAL REMOVAL OF FOOT BONE: CPT | Mod: 59 | Performed by: PODIATRIST

## 2020-12-18 PROCEDURE — 999N000180 XR SURGERY CARM FLUORO LESS THAN 5 MIN

## 2020-12-18 DEVICE — QCKFIX SCRW,TI,CANC.,PT,3.0X 28MM
Type: IMPLANTABLE DEVICE | Site: ANKLE | Status: FUNCTIONAL
Brand: ARTHREX®

## 2020-12-18 DEVICE — 3.0 X 16MM VAL SCREW, TI
Type: IMPLANTABLE DEVICE | Site: ANKLE | Status: FUNCTIONAL
Brand: ARTHREX®

## 2020-12-18 DEVICE — 3.0 X 14MM VAL SCREW, TI
Type: IMPLANTABLE DEVICE | Site: ANKLE | Status: FUNCTIONAL
Brand: ARTHREX®

## 2020-12-18 DEVICE — LO-PRO SCRW,TI,3.0MMX 16MMCORT
Type: IMPLANTABLE DEVICE | Site: ANKLE | Status: FUNCTIONAL
Brand: ARTHREX®

## 2020-12-18 DEVICE — LO-PRO STR MTP PLT,SHT,TI
Type: IMPLANTABLE DEVICE | Site: ANKLE | Status: FUNCTIONAL
Brand: ARTHREX®

## 2020-12-18 RX ORDER — ONDANSETRON 2 MG/ML
INJECTION INTRAMUSCULAR; INTRAVENOUS PRN
Status: DISCONTINUED | OUTPATIENT
Start: 2020-12-18 | End: 2020-12-18

## 2020-12-18 RX ORDER — PROPOFOL 10 MG/ML
INJECTION, EMULSION INTRAVENOUS CONTINUOUS PRN
Status: DISCONTINUED | OUTPATIENT
Start: 2020-12-18 | End: 2020-12-18

## 2020-12-18 RX ORDER — CEFAZOLIN SODIUM 2 G/100ML
2 INJECTION, SOLUTION INTRAVENOUS
Status: COMPLETED | OUTPATIENT
Start: 2020-12-18 | End: 2020-12-18

## 2020-12-18 RX ORDER — PROPOFOL 10 MG/ML
INJECTION, EMULSION INTRAVENOUS PRN
Status: DISCONTINUED | OUTPATIENT
Start: 2020-12-18 | End: 2020-12-18

## 2020-12-18 RX ORDER — NALOXONE HYDROCHLORIDE 0.4 MG/ML
0.2 INJECTION, SOLUTION INTRAMUSCULAR; INTRAVENOUS; SUBCUTANEOUS
Status: DISCONTINUED | OUTPATIENT
Start: 2020-12-18 | End: 2020-12-18 | Stop reason: HOSPADM

## 2020-12-18 RX ORDER — ACETAMINOPHEN 325 MG/1
650 TABLET ORAL
Status: DISCONTINUED | OUTPATIENT
Start: 2020-12-18 | End: 2020-12-18 | Stop reason: HOSPADM

## 2020-12-18 RX ORDER — FENTANYL CITRATE 50 UG/ML
INJECTION, SOLUTION INTRAMUSCULAR; INTRAVENOUS PRN
Status: DISCONTINUED | OUTPATIENT
Start: 2020-12-18 | End: 2020-12-18

## 2020-12-18 RX ORDER — SODIUM CHLORIDE, SODIUM LACTATE, POTASSIUM CHLORIDE, CALCIUM CHLORIDE 600; 310; 30; 20 MG/100ML; MG/100ML; MG/100ML; MG/100ML
INJECTION, SOLUTION INTRAVENOUS CONTINUOUS
Status: DISCONTINUED | OUTPATIENT
Start: 2020-12-18 | End: 2020-12-18 | Stop reason: HOSPADM

## 2020-12-18 RX ORDER — OXYCODONE HYDROCHLORIDE 5 MG/1
5 TABLET ORAL
Status: COMPLETED | OUTPATIENT
Start: 2020-12-18 | End: 2020-12-18

## 2020-12-18 RX ORDER — DEXAMETHASONE SODIUM PHOSPHATE 4 MG/ML
INJECTION, SOLUTION INTRA-ARTICULAR; INTRALESIONAL; INTRAMUSCULAR; INTRAVENOUS; SOFT TISSUE PRN
Status: DISCONTINUED | OUTPATIENT
Start: 2020-12-18 | End: 2020-12-18

## 2020-12-18 RX ORDER — CEFAZOLIN SODIUM 1 G/50ML
1 INJECTION, SOLUTION INTRAVENOUS SEE ADMIN INSTRUCTIONS
Status: DISCONTINUED | OUTPATIENT
Start: 2020-12-18 | End: 2020-12-18 | Stop reason: HOSPADM

## 2020-12-18 RX ORDER — NALOXONE HYDROCHLORIDE 0.4 MG/ML
0.4 INJECTION, SOLUTION INTRAMUSCULAR; INTRAVENOUS; SUBCUTANEOUS
Status: DISCONTINUED | OUTPATIENT
Start: 2020-12-18 | End: 2020-12-18 | Stop reason: HOSPADM

## 2020-12-18 RX ORDER — OXYCODONE HYDROCHLORIDE 5 MG/1
5 TABLET ORAL EVERY 6 HOURS PRN
Qty: 16 TABLET | Refills: 0 | Status: SHIPPED | OUTPATIENT
Start: 2020-12-18 | End: 2021-08-02

## 2020-12-18 RX ORDER — MEPERIDINE HYDROCHLORIDE 50 MG/ML
12.5 INJECTION INTRAMUSCULAR; INTRAVENOUS; SUBCUTANEOUS
Status: DISCONTINUED | OUTPATIENT
Start: 2020-12-18 | End: 2020-12-18 | Stop reason: HOSPADM

## 2020-12-18 RX ORDER — ONDANSETRON 2 MG/ML
4 INJECTION INTRAMUSCULAR; INTRAVENOUS EVERY 30 MIN PRN
Status: DISCONTINUED | OUTPATIENT
Start: 2020-12-18 | End: 2020-12-18 | Stop reason: HOSPADM

## 2020-12-18 RX ORDER — HYDROMORPHONE HYDROCHLORIDE 1 MG/ML
.3-.5 INJECTION, SOLUTION INTRAMUSCULAR; INTRAVENOUS; SUBCUTANEOUS EVERY 10 MIN PRN
Status: DISCONTINUED | OUTPATIENT
Start: 2020-12-18 | End: 2020-12-18 | Stop reason: HOSPADM

## 2020-12-18 RX ORDER — HYDROXYZINE HYDROCHLORIDE 10 MG/1
20 TABLET, FILM COATED ORAL
Status: DISCONTINUED | OUTPATIENT
Start: 2020-12-18 | End: 2020-12-18 | Stop reason: HOSPADM

## 2020-12-18 RX ORDER — LIDOCAINE HYDROCHLORIDE 20 MG/ML
INJECTION, SOLUTION INFILTRATION; PERINEURAL PRN
Status: DISCONTINUED | OUTPATIENT
Start: 2020-12-18 | End: 2020-12-18

## 2020-12-18 RX ORDER — ACETAMINOPHEN 325 MG/1
650 TABLET ORAL EVERY 4 HOURS PRN
Qty: 50 TABLET | Refills: 0 | Status: SHIPPED | OUTPATIENT
Start: 2020-12-18 | End: 2021-08-02

## 2020-12-18 RX ORDER — FENTANYL CITRATE 50 UG/ML
25-50 INJECTION, SOLUTION INTRAMUSCULAR; INTRAVENOUS
Status: DISCONTINUED | OUTPATIENT
Start: 2020-12-18 | End: 2020-12-18 | Stop reason: HOSPADM

## 2020-12-18 RX ORDER — ONDANSETRON 4 MG/1
4 TABLET, ORALLY DISINTEGRATING ORAL EVERY 30 MIN PRN
Status: DISCONTINUED | OUTPATIENT
Start: 2020-12-18 | End: 2020-12-18 | Stop reason: HOSPADM

## 2020-12-18 RX ADMIN — PROPOFOL 50 MG: 10 INJECTION, EMULSION INTRAVENOUS at 09:10

## 2020-12-18 RX ADMIN — PROPOFOL 125 MCG/KG/MIN: 10 INJECTION, EMULSION INTRAVENOUS at 09:02

## 2020-12-18 RX ADMIN — OXYCODONE HYDROCHLORIDE 5 MG: 5 TABLET ORAL at 10:05

## 2020-12-18 RX ADMIN — SODIUM CHLORIDE, POTASSIUM CHLORIDE, SODIUM LACTATE AND CALCIUM CHLORIDE: 600; 310; 30; 20 INJECTION, SOLUTION INTRAVENOUS at 08:40

## 2020-12-18 RX ADMIN — FENTANYL CITRATE 25 MCG: 50 INJECTION, SOLUTION INTRAMUSCULAR; INTRAVENOUS at 09:02

## 2020-12-18 RX ADMIN — MIDAZOLAM 2 MG: 1 INJECTION INTRAMUSCULAR; INTRAVENOUS at 09:02

## 2020-12-18 RX ADMIN — LIDOCAINE HYDROCHLORIDE 40 MG: 20 INJECTION, SOLUTION INFILTRATION; PERINEURAL at 09:02

## 2020-12-18 RX ADMIN — ONDANSETRON 4 MG: 2 INJECTION INTRAMUSCULAR; INTRAVENOUS at 09:02

## 2020-12-18 RX ADMIN — CEFAZOLIN SODIUM 2 G: 2 INJECTION, SOLUTION INTRAVENOUS at 09:02

## 2020-12-18 RX ADMIN — FENTANYL CITRATE 50 MCG: 50 INJECTION, SOLUTION INTRAMUSCULAR; INTRAVENOUS at 09:59

## 2020-12-18 RX ADMIN — PROPOFOL 140 MG: 10 INJECTION, EMULSION INTRAVENOUS at 09:02

## 2020-12-18 RX ADMIN — DEXAMETHASONE SODIUM PHOSPHATE 4 MG: 4 INJECTION, SOLUTION INTRA-ARTICULAR; INTRALESIONAL; INTRAMUSCULAR; INTRAVENOUS; SOFT TISSUE at 09:02

## 2020-12-18 ASSESSMENT — MIFFLIN-ST. JEOR: SCORE: 1534.82

## 2020-12-18 NOTE — ANESTHESIA POSTPROCEDURE EVALUATION
Patient: Lilian Craft    Procedure(s):  RECONSTRUCTION, FOREFOOT, WITH MTP JOINT FUSION, 2nd metatarsal head resection    Diagnosis:Metatarsalgia of left foot [M77.42]  Hallux rigidus of left foot [M20.22]  Diagnosis Additional Information: No value filed.    Anesthesia Type:  MAC    Note:  Anesthesia Post Evaluation    Patient location during evaluation: Phase 2  Patient participation: Able to fully participate in evaluation  Level of consciousness: awake and alert  Pain management: adequate  Airway patency: patent  Cardiovascular status: acceptable  Respiratory status: acceptable  Hydration status: acceptable  PONV: none     Anesthetic complications: None          Last vitals:  Vitals:    12/18/20 0830 12/18/20 0946 12/18/20 1000   BP: 129/86 109/72 (!) 115/96   Pulse:  74 79   Resp: 16     Temp: 99  F (37.2  C)     SpO2: 99% 99% 98%         Electronically Signed By: ALEENA Mclean CRNA  December 18, 2020  10:54 AM

## 2020-12-18 NOTE — ANESTHESIA PREPROCEDURE EVALUATION
Anesthesia Pre-Procedure Evaluation    Patient: Lilian Craft   MRN: [id number] : 1965          Preoperative Diagnosis: Metatarsalgia of left foot [M77.42]  Hallux rigidus of left foot [M20.22]    Procedure(s):  RECONSTRUCTION, FOREFOOT, WITH MTP JOINT FUSION, 2nd metatarsal head resection    Past Medical History:   Diagnosis Date     Borderline hypertension      H/O bee sting allergy      Osteoarthritis      PUD (peptic ulcer disease)      Seasonal allergies      Past Surgical History:   Procedure Laterality Date     COLONOSCOPY N/A 12/15/2020    Procedure: COLONOSCOPY, WITH POLYPECTOMY;  Surgeon: Varinder Gomez MD;  Location:  OR       Anesthesia Evaluation     . Pt has had prior anesthetic.     No history of anesthetic complications          ROS/MED HX    ENT/Pulmonary:  - neg pulmonary ROS     Neurologic:  - neg neurologic ROS     Cardiovascular:     (+) Dyslipidemia, hypertension----. : . . . :. .       METS/Exercise Tolerance:  >4 METS   Hematologic:  - neg hematologic  ROS       Musculoskeletal:   (+) arthritis,  -       GI/Hepatic:  - neg GI/hepatic ROS       Renal/Genitourinary:  - ROS Renal section negative       Endo:  - neg endo ROS       Psychiatric:  - neg psychiatric ROS       Infectious Disease:  - neg infectious disease ROS       Malignancy:      - no malignancy   Other:    - neg other ROS                      Physical Exam  Normal systems: cardiovascular, pulmonary and dental    Airway   Mallampati: II  TM distance: >3 FB  Neck ROM: full    Dental     Cardiovascular   Rhythm and rate: regular and normal      Pulmonary    breath sounds clear to auscultation            Lab Results   Component Value Date    WBC 9.9 2020    HGB 13.6 2020    HCT 42.7 2020     2020    CRP 0.1 2020    SED 12 2019     2020    POTASSIUM 3.9 2020    CHLORIDE 102 2020    CO2 30 2020    BUN 16 2020    CR 0.81 2020    GLC 95  "11/19/2020    ANNIE 10.3 11/19/2020    MAG 1.9 03/09/2020    ALBUMIN 4.8 11/19/2020    PROTTOTAL 8.2 11/19/2020    ALT 32 11/19/2020    AST 16 11/19/2020    ALKPHOS 66 11/19/2020    BILITOTAL 0.4 11/19/2020    LIPASE 15 03/09/2020       Preop Vitals  BP Readings from Last 3 Encounters:   12/18/20 129/86   12/15/20 (!) 148/98   12/04/20 136/78    Pulse Readings from Last 3 Encounters:   12/15/20 82   12/04/20 98   11/19/20 94      Resp Readings from Last 3 Encounters:   12/18/20 16   12/15/20 14   12/04/20 18    SpO2 Readings from Last 3 Encounters:   12/18/20 99%   12/15/20 99%   12/04/20 100%      Temp Readings from Last 1 Encounters:   12/18/20 99  F (37.2  C) (Tympanic)    Ht Readings from Last 1 Encounters:   12/18/20 1.753 m (5' 9\")      Wt Readings from Last 1 Encounters:   12/18/20 87.5 kg (193 lb)    Estimated body mass index is 28.5 kg/m  as calculated from the following:    Height as of this encounter: 1.753 m (5' 9\").    Weight as of this encounter: 87.5 kg (193 lb).       Anesthesia Plan      History & Physical Review      ASA Status:  2 .    NPO Status:  > 8 hours    Plan for MAC            Postoperative Care      Consents  Anesthetic plan, risks, benefits and alternatives discussed with:  Patient..                 ALEENA Mclean CRNA  "

## 2020-12-18 NOTE — BRIEF OP NOTE
[facility]    Brief Operative Note    Pre-operative diagnosis: Metatarsalgia of left foot [M77.42]  Hallux rigidus of left foot [M20.22]  Post-operative diagnosis Same as pre-operative diagnosis    Procedure: Procedure(s):  RECONSTRUCTION, FOREFOOT, WITH MTP JOINT FUSION, 2nd metatarsal head resection  Surgeon: Surgeon(s) and Role:     * Conrad Parish DPM - Primary     * Rene Ruff PA - Assisting  Anesthesia: Combined MAC with Local   Estimated blood loss: Minimal  Drains: None  Specimens: * No specimens in log *  Findings:   None.  Complications: None.  Implants:   Implant Name Type Inv. Item Serial No.  Lot No. LRB No. Used Action   IMP PLATE ARTHREX LOW PRO MTP SHORT 1.5MM STR TI [id number] Metallic Hardware/[employer] IMP PLATE ARTHREX LOW PRO MTP SHORT 1.5MM STR TI [id number]  ARTHREX  Left 1 Implanted   IMP SCR ARTHREX MTP LP CORTICAL 3X16MM TI [id number] Metallic Hardware/[place] IMP SCR ARTHREX MTP LP CORTICAL 3X16MM TI [id number]  ARTHREX  Left 1 Implanted   IMP SCR ARTHREX QUICKFIX CANC PT 3.0X28MM TI [id number] Metallic Hardware/[employer] IMP SCR ARTHREX QUICKFIX CANC PT 3.0X28MM TI [id number]  ARTHREX  Left 1 Implanted   IMP SCR ARTHREX PASCUAL 3.0X14MM TI [id number] Metallic Hardware/[place] IMP SCR ARTHREX PASCUAL 3.0X14MM TI [id number]  ARTHREX  Left 1 Implanted   IMP SCR ARTHREX PASCUAL 3.0X16MM TI [id number] Metallic Hardware/[place] IMP SCR ARTHREX PASCUAL 3.0X16MM TI [id number]  ARTHREX  Left 3 Implanted

## 2020-12-18 NOTE — DISCHARGE INSTRUCTIONS
[facility]  Adult Discharge Orders & Instructions      For 24 hours after surgery:  1. Get plenty of rest.  A responsible adult must stay with you for at least 24 hours after you leave the hospital.   2. You may feel lightheaded.  IF so, sit for a few minutes before standing.  Have someone help you get up.   3. You may have a slight fever. Call the doctor if your fever is over 101 F (38.3 C) (taken under the tongue) or lasts longer than 24 hours.  4. You may have a dry mouth, a sore throat, muscle aches or trouble sleeping.  These should go away after 24 hours.  5. Do not make important or legal decisions.  6.   Do not drive or use heavy equipment.  If you have weakness or tingling, don't drive or use heavy equipment until this feeling goes away.                                                                                                                                                                           To contact a doctor, call    [phone]______________

## 2020-12-18 NOTE — ANESTHESIA CARE TRANSFER NOTE
Patient: Lilian Craft    Procedure(s):  RECONSTRUCTION, FOREFOOT, WITH MTP JOINT FUSION, 2nd metatarsal head resection    Diagnosis: Metatarsalgia of left foot [M77.42]  Hallux rigidus of left foot [M20.22]  Diagnosis Additional Information: No value filed.    Anesthesia Type:   MAC     Note:  Airway :Room Air  Patient transferred to:Phase II  Handoff Report: Identifed the Patient, Identified the Reponsible Provider, Reviewed the pertinent medical history, Discussed the surgical course, Reviewed Intra-OP anesthesia mangement and issues during anesthesia, Set expectations for post-procedure period and Allowed opportunity for questions and acknowledgement of understanding      Vitals: (Last set prior to Anesthesia Care Transfer)    CRNA VITALS  12/18/2020 0913 - 12/18/2020 0948      12/18/2020             Resp Rate (set):  10                Electronically Signed By: ALEENA Mclean CRNA  December 18, 2020  9:48 AM

## 2020-12-18 NOTE — OP NOTE
Procedure Date: 12/18/2020      SURGEON:  Conrad Parish DPM      ASSISTANT:  Rene Ruff PA-C.  A skilled first assistant was necessary due to technical complexity of the procedure and for the patient's safety.  The assistant helped with positioning, retraction, visualization of the operative field and moreover helped to complete the procedure in a technically safe and efficient manner.        PREOPERATIVE DIAGNOSIS:  First and metatarsophalangeal joint (MPJ) arthrosis with associated pain, left foot.      POSTOPERATIVE DIAGNOSIS:  First and metatarsophalangeal joint (MPJ) arthrosis with associated pain, left foot.      PROCEDURES:     1.  Left first MPJ fusion.   2.  Left second metatarsal head resection.      ANESTHESIA:  MAC with local.      HEMOSTASIS:  Ankle tourniquet, electrocautery.      ESTIMATED BLOOD LOSS:  Minimal.      MATERIALS:  Arthrex first MPJ fusion plate, 4-0 cannulated screw.      INDICATIONS FOR PROCEDURE:  This patient has had persistent pain associated with described pathology.  She has extensive arthrosis and elected to proceed with surgery as detailed after failure of nonoperative management and a detailed discussion of risks, potential complications, alternatives and benefits.      DESCRIPTION OF PROCEDURE:  Supine position.  Utilized MAC anesthesia.  Local block performed.  Left lower extremity prepped and draped in the usual sterile fashion.  Ankle tourniquet utilized for duration of procedure.      Attention was directed to the dorsal medial foot.  Incision made over the first metatarsophalangeal joint, medial and extensor tendon deepened using blunt and sharp dissection.  Metatarsophalangeal joint carefully exposed and mobilized.  The articular surface was resected with a power saw using flat cuts.  A dorsal medial eminence resected on both the metatarsal and the proximal phalanx.  Had good bleeding bone on both sides of the fusion.  It was positioned in appropriate position and  fixed with a distal medial proximal lateral 3-0 cannulated compression screw.  Augmented with a dorsal straight first MPJ locking plate with 2.7 cortical and locking screws.  This was flushed with copious amounts of normal saline.  Hardware was positioned, and position of fusion was confirmed with intraoperative fluoroscopy.  It was excellent.  Flushed with normal saline.  Deep tissue repaired with 2-0 Vicryl, skin with nonabsorbable suture.      Attention then directed to second metatarsophalangeal joint.  Incision made dorsally, deepened using blunt and sharp dissection.  Capsulotomy performed, taking care to preserve the extensor tendon.  The metatarsal head exposed.  Had extensive arthrosis here, spurring on both sides of the joint.  The metatarsal head was resected just proximal to the flare of the head and neck junction.  The head removed, flushed with normal saline.  Deep tissue repaired with 2-0 Vicryl, skin with nonabsorbable suture.  Placed in a sterile dressing and a Cam boot.  Weightbear as tolerated and follow-up as scheduled.         JULIAN MILLER DPM             D: 2020   T: 2020   MT:       Name:     KAYLA BAILEY   MRN:      [id number]        Account:        [id number]   :      1965           Procedure Date: 2020      Document: [id number]

## 2020-12-18 NOTE — OR NURSING
Patient has been discharged to home at 1055 via ambulatory accompanied by RN    Written discharge instructions were provided to Patient.  Prescriptions were sent to [facility] and picked up by her S.O. She states her pain is tolerable, and denies any nausea or dizziness upon discharge.      Patient and adult caring for them verbalize understanding of discharge instructions including no driving until tomorrow and no longer taking narcotic pain medications - no operating mechanical equipment and no making any important decisions.They understand reason for discharge, and necessary follow-up appointments.      Melissa Menjivar RN

## 2020-12-28 ENCOUNTER — HOSPITAL ENCOUNTER (OUTPATIENT)
Dept: GENERAL RADIOLOGY | Facility: OTHER | Age: 55
End: 2020-12-28
Attending: ORTHOPAEDIC SURGERY
Payer: COMMERCIAL

## 2020-12-28 ENCOUNTER — OFFICE VISIT (OUTPATIENT)
Dept: ORTHOPEDICS | Facility: OTHER | Age: 55
End: 2020-12-28
Attending: ORTHOPAEDIC SURGERY
Payer: COMMERCIAL

## 2020-12-28 ENCOUNTER — TELEPHONE (OUTPATIENT)
Dept: FAMILY MEDICINE | Facility: OTHER | Age: 55
End: 2020-12-28

## 2020-12-28 DIAGNOSIS — M20.22 HALLUX RIGIDUS OF LEFT FOOT: Primary | ICD-10-CM

## 2020-12-28 DIAGNOSIS — Z98.890 S/P FOOT SURGERY: Primary | ICD-10-CM

## 2020-12-28 DIAGNOSIS — Z98.890 S/P FOOT SURGERY: ICD-10-CM

## 2020-12-28 DIAGNOSIS — M48.02 SPINAL STENOSIS IN CERVICAL REGION: Primary | ICD-10-CM

## 2020-12-28 PROCEDURE — 99024 POSTOP FOLLOW-UP VISIT: CPT | Performed by: ORTHOPAEDIC SURGERY

## 2020-12-28 PROCEDURE — 73630 X-RAY EXAM OF FOOT: CPT

## 2020-12-28 PROCEDURE — G0463 HOSPITAL OUTPT CLINIC VISIT: HCPCS

## 2020-12-28 NOTE — TELEPHONE ENCOUNTER
A referral for neurology only would be needed per unit 4 referral.      Kimmy Martinez LPN on 12/28/2020 at 2:27 PM

## 2020-12-28 NOTE — TELEPHONE ENCOUNTER
I placed a referral for Ortho/neurosurgery on 12/4.  Can you see if my previous order is sufficient or do they need a new order? I believe she wanted to see neurosurgery in [place].  Thanks!  -Michaelle Simon MD       3. Spinal stenosis in cervical region  4. Spinal stenosis of lumbar region with radiculopathy  -Patient has done physical therapy in the past, and due to ongoing pain she would like to pursue surgical intervention of both her neck and her low back  - [facility]opedic [facility] Spine  Referral; Future

## 2020-12-28 NOTE — TELEPHONE ENCOUNTER
Patient called and wanted to know if [facility] had put in a referral for Neurosurgery for neck surgery per conversation that they had at her last appt.

## 2020-12-29 NOTE — PROGRESS NOTES
Visit Date:   2020      HISTORY OF PRESENT ILLNESS:  She is 10 days status post left first MTPJ fusion and left second metatarsal head resection.  She is doing fairly well at this point.  No complaints with her foot whatsoever.  She said she has been walking in the boot with it, and it has not given her any pain at all.      PHYSICAL EXAMINATION:  Her sutures were removed today.  Steri-Strips were applied.  Her foot is completely benign.  There is no evidence for infection whatsoever.      IMAGING:  X-ray examination shows hardware in good position at the first MTPJ with resection of the second metatarsal head.  She looks to be doing quite well with this.      IMPRESSION AND PLAN:  This woman is doing quite well 10 days status post a left first metatarsophalangeal joint fusion and a left second metatarsal head resection, doing very well at this time.  No complaints.  She will follow-up with Dr. Parish in about 2-3 weeks.         AIXA HANSEN MD             D: 2020   T: 2020   MT: YUDELKA      Name:     KAYLA BAILEY   MRN:      [id number]        Account:      [id number]   :      1965           Visit Date:   2020      Document: [id number]

## 2021-01-07 DIAGNOSIS — G43.719 INTRACTABLE CHRONIC MIGRAINE WITHOUT AURA AND WITHOUT STATUS MIGRAINOSUS: ICD-10-CM

## 2021-01-08 RX ORDER — BUTALBITAL, ACETAMINOPHEN AND CAFFEINE 50; 325; 40 MG/1; MG/1; MG/1
1 TABLET ORAL EVERY 4 HOURS PRN
Qty: 30 TABLET | Refills: 0 | Status: SHIPPED | OUTPATIENT
Start: 2021-01-08 | End: 2021-02-16

## 2021-01-08 NOTE — TELEPHONE ENCOUNTER
Disp Refills Start End FOSTER   butalbital-acetaminophen-caffeine (ESGIC) -40 MG tablet 30 tablet 0 12/4/2020  No   Sig - Route: Take 1 tablet by mouth every 4 hours as needed for headaches - Oral       LOV: 12/4/2020  Future Office visit:    Next 5 appointments (look out 90 days)    Jan 21, 2021  2:00 PM  Return Visit with Conrad Parish DPM  [facility] ([facility]) [address]  [place] [id number] [phone]        Routing refill request to provider for review/approval because:  Drug not on the [facility], [facility] or [facility] refill protocol or controlled substance    Requested Prescriptions   Pending Prescriptions Disp Refills     butalbital-acetaminophen-caffeine (ESGIC) -40 MG tablet [Pharmacy Med Name: butalbital-acetaminophen-caffeine 50 mg-325 mg-40 mg tablet] 30 tablet 0     Sig: Take 1 tablet by mouth every 4 hours as needed for headaches       There is no refill protocol information for this order        Unable to complete prescription refill per RN Medication Refill Policy.................... Shagufta Garduno RN ....................  1/8/2021   3:57 PM

## 2021-01-14 DIAGNOSIS — Z98.890 S/P FOOT SURGERY: Primary | ICD-10-CM

## 2021-01-21 ENCOUNTER — HOSPITAL ENCOUNTER (OUTPATIENT)
Dept: GENERAL RADIOLOGY | Facility: OTHER | Age: 56
End: 2021-01-21
Attending: PODIATRIST
Payer: COMMERCIAL

## 2021-01-21 ENCOUNTER — OFFICE VISIT (OUTPATIENT)
Dept: ORTHOPEDICS | Facility: OTHER | Age: 56
End: 2021-01-21
Attending: PODIATRIST
Payer: COMMERCIAL

## 2021-01-21 DIAGNOSIS — Z09 POSTOP CHECK: Primary | ICD-10-CM

## 2021-01-21 DIAGNOSIS — Z98.890 S/P FOOT SURGERY: ICD-10-CM

## 2021-01-21 PROCEDURE — 99024 POSTOP FOLLOW-UP VISIT: CPT | Performed by: PHYSICIAN ASSISTANT

## 2021-01-21 PROCEDURE — 73630 X-RAY EXAM OF FOOT: CPT

## 2021-01-21 PROCEDURE — G0463 HOSPITAL OUTPT CLINIC VISIT: HCPCS

## 2021-01-21 NOTE — PROGRESS NOTES
Patient is here for follow up on her left foot.   Meme Dumont LPN .....................1/21/2021 2:09 PM

## 2021-01-22 NOTE — PROGRESS NOTES
Visit Date:   2021      Lilian comes in today.  She is approximately 5 weeks out from a left first MTPJ fusion and left second metatarsal head resection.  She states that she is doing quite well.  She is very pleased with her results.  At this time, she is not complaining of any pain or any complications or problems.  She is walking in her CAM walking boot today.      X-rays taken today, 3 views of her left foot, show well-fixed, well-maintained hardware with resection of the second metatarsal head.      OBJECTIVE:     EXTREMITIES:  On exam today, the incisions are clean and dry.  There are no signs of infection.  She does have some moderate amount of swelling in her foot, but otherwise no other issues.  She denies any numbness or tingling or calf pain.      ASSESSMENT:  Status post left first metatarsophalangeal joint fusion and left second metatarsal head resection, doing well.  Plan is to have her gradually discontinue the boot and start getting into a stiff-soled shoe and increase her activities as tolerated.  She will follow-up in 4 weeks.  At that time, we will get new x-rays of her left foot.         JULIAN MILLER DPM       As dictated by ANTHONY MENDOZA            D: 2021   T: 2021   MT: CHAVA      Name:     LILIAN BAILEY   MRN:      [id number]        Account:      [id number]   :      1965           Visit Date:   2021      Document: [id number]

## 2021-02-15 DIAGNOSIS — G43.719 INTRACTABLE CHRONIC MIGRAINE WITHOUT AURA AND WITHOUT STATUS MIGRAINOSUS: ICD-10-CM

## 2021-02-16 RX ORDER — BUTALBITAL, ACETAMINOPHEN AND CAFFEINE 50; 325; 40 MG/1; MG/1; MG/1
1 TABLET ORAL EVERY 4 HOURS PRN
Qty: 30 TABLET | Refills: 0 | Status: SHIPPED | OUTPATIENT
Start: 2021-02-16 | End: 2021-03-17

## 2021-02-16 NOTE — TELEPHONE ENCOUNTER
Disp Refills Start End FOSTER   butalbital-acetaminophen-caffeine (ESGIC) -40 MG tablet 30 tablet 0 1/8/2021  No   Sig - Route: Take 1 tablet by mouth every 4 hours as needed for headaches - Oral       LOV: 12/4/2020  Future Office visit:    Next 5 appointments (look out 90 days)    Mar 15, 2021  8:45 AM  Return Visit with Kirk Morgan MD  [facility] [address]  [place] [address] [phone]         Routing refill request to provider for review/approval because:  Drug not on the [facility], [facility] or [facility] refill protocol or controlled substance    Requested Prescriptions   Pending Prescriptions Disp Refills     butalbital-acetaminophen-caffeine (ESGIC) -40 MG tablet [Pharmacy Med Name: butalbital-acetaminophen-caffeine 50 mg-325 mg-40 mg tablet] 30 tablet 0     Sig: Take 1 tablet by mouth every 4 hours as needed for headaches       There is no refill protocol information for this order        Unable to complete prescription refill per RN Medication Refill Policy.................... Shagufta Garduno RN ....................  2/16/2021   12:44 PM

## 2021-03-12 DIAGNOSIS — Z98.890 S/P FOOT SURGERY: Primary | ICD-10-CM

## 2021-03-15 ENCOUNTER — HOSPITAL ENCOUNTER (OUTPATIENT)
Dept: GENERAL RADIOLOGY | Facility: OTHER | Age: 56
End: 2021-03-15
Attending: ORTHOPAEDIC SURGERY
Payer: COMMERCIAL

## 2021-03-15 ENCOUNTER — OFFICE VISIT (OUTPATIENT)
Dept: ORTHOPEDICS | Facility: OTHER | Age: 56
End: 2021-03-15
Attending: ORTHOPAEDIC SURGERY
Payer: COMMERCIAL

## 2021-03-15 DIAGNOSIS — Z98.890 S/P FOOT SURGERY: Primary | ICD-10-CM

## 2021-03-15 DIAGNOSIS — Z98.890 S/P FOOT SURGERY: ICD-10-CM

## 2021-03-15 PROCEDURE — G0463 HOSPITAL OUTPT CLINIC VISIT: HCPCS

## 2021-03-15 PROCEDURE — 99024 POSTOP FOLLOW-UP VISIT: CPT | Performed by: ORTHOPAEDIC SURGERY

## 2021-03-15 PROCEDURE — 73630 X-RAY EXAM OF FOOT: CPT

## 2021-03-15 NOTE — PROGRESS NOTES
Chief Complaint   Patient presents with     Surgical Followup     s/p 12.5 left first mtpj fusion and second metatarsal head resection     Lyudmila Crocker LPN

## 2021-03-15 NOTE — PROGRESS NOTES
Visit Date:   03/15/2021      HISTORY OF PRESENT ILLNESS:  She is now 12-1/2 weeks status post left first MTPJ fusion and a left second metatarsal head resection.  Absolutely no complaints with her left foot whatsoever.        PHYSICAL EXAMINATION:  The wounds are completely benign today.  The foot appears to be completely benign.        IMAGING:  X-ray examination shows good interval healing of the MTPJ fusion.  No significant issues with the x-rays whatsoever.  Her wounds are completely benign.        RECOMMENDATIONS:  We are going to have her follow-up with Dr. Parish in a month or 2 just for a closure appointment.  We are going to get her set up with one of our spine surgeons for neck surgery in the near future as well.         AIXA HANSEN MD             D: 03/15/2021   T: 03/15/2021   MT: FRIEDA      Name:     KAYLA BAILEY   MRN:      [id number]        Account:      [id number]   :      1965           Visit Date:   03/15/2021      Document: [id number]

## 2021-04-19 DIAGNOSIS — M48.061 SPINAL STENOSIS OF LUMBAR REGION, UNSPECIFIED WHETHER NEUROGENIC CLAUDICATION PRESENT: ICD-10-CM

## 2021-04-19 DIAGNOSIS — M48.02 CERVICAL STENOSIS OF SPINE: Primary | ICD-10-CM

## 2021-04-29 ENCOUNTER — OFFICE VISIT (OUTPATIENT)
Dept: ORTHOPEDICS | Facility: OTHER | Age: 56
End: 2021-04-29
Attending: PODIATRIST
Payer: COMMERCIAL

## 2021-04-29 DIAGNOSIS — Z09 POSTOPERATIVE FOLLOW-UP: Primary | ICD-10-CM

## 2021-04-29 DIAGNOSIS — Z98.890 S/P FOOT SURGERY: Primary | ICD-10-CM

## 2021-04-29 PROCEDURE — 99212 OFFICE O/P EST SF 10 MIN: CPT | Performed by: PHYSICIAN ASSISTANT

## 2021-04-29 PROCEDURE — G0463 HOSPITAL OUTPT CLINIC VISIT: HCPCS

## 2021-04-29 NOTE — PROGRESS NOTES
Visit Date: 2021    Lilian comes in today.  She is approximately four and a half months out from her left first MPJ fusion and second metatarsal head resection performed by Dr. Parish.  She states that she is doing quite well.  She has been wearing sandals and getting around and being very active, and without any complaints.    PHYSICAL EXAMINATION:  On exam today, the incisions are well healed.  There are no signs of infection.  She has some mild swelling.  Other than that, no other issues.  No calf pain.  No numbness or tingling.    ASSESSMENT:  Status post left MPJ fusion and second metatarsal head resection, doing quite well.     PLAN:  The plan at this time is to continue her activities as she can tolerate.  She will follow up on a p.r.n. basis.    Conrad Parish DPM    As Dictated by ANTHONY MENDOZA        D: 2021   T: 2021   MT: KYLIE    Name:     LILIAN BAILEY  MRN:      [id number]        Account:    [id number]   :      1965           Visit Date: 2021     Document: [id number]

## 2021-04-29 NOTE — PROGRESS NOTES
Patient is here for follow up on her left foot.  Meme Dumont LPN .....................4/29/2021 1:03 PM

## 2021-06-08 DIAGNOSIS — G43.719 INTRACTABLE CHRONIC MIGRAINE WITHOUT AURA AND WITHOUT STATUS MIGRAINOSUS: Primary | ICD-10-CM

## 2021-06-09 NOTE — TELEPHONE ENCOUNTER
[facility] sent Rx request for the following:      Requested Prescriptions   Pending Prescriptions Disp Refills   topiramate (TOPAMAX) 50 MG tablet [Pharmacy Med Name: topiramate 50 mg tablet] 90 tablet 1    Sig: Take 1 tablet (50 mg) by mouth daily   Last Prescription Date:   12/4/20  Last Fill Qty/Refills:         90, R-0 (End date: 6/2/21)    Last Office Visit:              12/4/20   Future Office visit:           None  Routing refill request to provider for review/approval because:   Anti-Seizure Meds Protocol  Failed - 6/9/2021  4:43 PM       Failed - Review Authorizing provider's last note.      In clinical absence of patient's primary, Michaelle Simon, patient is requesting that this message be sent to the covering provider for consideration please.    Unable to complete prescription refill per RN Medication Refill Policy. Kami Varela RN .............. 6/9/2021  4:45 PM

## 2021-06-10 RX ORDER — TOPIRAMATE 50 MG/1
50 TABLET, FILM COATED ORAL DAILY
Qty: 90 TABLET | Refills: 0 | Status: SHIPPED | OUTPATIENT
Start: 2021-06-10 | End: 2021-09-21

## 2021-08-02 ENCOUNTER — OFFICE VISIT (OUTPATIENT)
Dept: FAMILY MEDICINE | Facility: OTHER | Age: 56
End: 2021-08-02
Attending: FAMILY MEDICINE
Payer: COMMERCIAL

## 2021-08-02 VITALS
DIASTOLIC BLOOD PRESSURE: 76 MMHG | BODY MASS INDEX: 30.96 KG/M2 | OXYGEN SATURATION: 99 % | TEMPERATURE: 99 F | SYSTOLIC BLOOD PRESSURE: 132 MMHG | HEIGHT: 69 IN | WEIGHT: 209 LBS | HEART RATE: 84 BPM | RESPIRATION RATE: 16 BRPM

## 2021-08-02 DIAGNOSIS — M17.11 ARTHRITIS OF RIGHT KNEE: Primary | ICD-10-CM

## 2021-08-02 DIAGNOSIS — M79.7 FIBROMYALGIA: ICD-10-CM

## 2021-08-02 DIAGNOSIS — M79.2 NEUROPATHIC PAIN: ICD-10-CM

## 2021-08-02 PROCEDURE — G0463 HOSPITAL OUTPT CLINIC VISIT: HCPCS | Mod: 25

## 2021-08-02 PROCEDURE — 99214 OFFICE O/P EST MOD 30 MIN: CPT | Performed by: FAMILY MEDICINE

## 2021-08-02 PROCEDURE — G0463 HOSPITAL OUTPT CLINIC VISIT: HCPCS

## 2021-08-02 PROCEDURE — 91300 PR COVID VAC PFIZER DIL RECON 30 MCG/0.3 ML IM: CPT

## 2021-08-02 RX ORDER — GABAPENTIN 100 MG/1
100 CAPSULE ORAL AT BEDTIME
Qty: 30 CAPSULE | Refills: 0 | Status: SHIPPED | OUTPATIENT
Start: 2021-08-02 | End: 2021-09-13

## 2021-08-02 ASSESSMENT — MIFFLIN-ST. JEOR: SCORE: 1602.4

## 2021-08-02 ASSESSMENT — PAIN SCALES - GENERAL: PAINLEVEL: MILD PAIN (3)

## 2021-08-02 NOTE — PROGRESS NOTES
"Nursing Notes:   Ayleen Redmond LPN  8/2/2021  9:09 AM  Sign at exiting of workspace  Chief Complaint   Patient presents with     Knee Pain       Initial /76 (BP Location: Right arm, Patient Position: Sitting, Cuff Size: Adult Regular)   Pulse 84   Temp 99  F (37.2  C) (Tympanic)   Resp 16   Ht 1.753 m (5' 9\")   Wt 94.8 kg (209 lb)   LMP  (LMP Unknown)   SpO2 99%   Breastfeeding No   BMI 30.86 kg/m   Estimated body mass index is 30.86 kg/m  as calculated from the following:    Height as of this encounter: 1.753 m (5' 9\").    Weight as of this encounter: 94.8 kg (209 lb).  Medication Reconciliation: Completed     FOOD SECURITY SCREENING QUESTIONS  Hunger Vital Signs:  Within the past 12 months we worried whether our food would run out before we got money to buy more. Never  Within the past 12 months the food we bought just didn't last and we didn't have money to get more. Never  Ayleen Redmond LPN 8/2/2021 9:09 AM      Ayleen Redmond LPN       SUBJECTIVE:  HPI: Lilian Craft is a 56 year old female here for knee pain and also multiple joint pain.    Right knee. Constant pain. With rest 3/10. Has to keep adjusting positions when seated to help with pain. Gives out on her. 7/10 with use. Has Irish Kendall puppy, and she has been very active lately.  Steroid injection to her right knee was performed last year, but patient did not note any benefit of her pain at that point, she inquires about viscosupplementation.    She also notes pain in multiple joints such as her neck, shoulders, back, knees. \"Pain every where\". Pain noted for several years.  Work-up for rheumatoid arthritis and autoimmune disease has been negative in the past. MRI w/ spinal stenosis.    Patient desires a Covid vaccine today also.    Allergies:  Allergies   Allergen Reactions     Bee Venom Swelling     Nsaids Other (See Comments)     Bleeding ulcers, tar stools     ROS:  Constitutional, HEENT, cardiovascular, pulmonary, GI " "and  systems are negative, except as otherwise noted.    Past medical, surgical, and family history reviewed and updated as appropriate in the chart.  Relevant social history listed in HPI.    OBJECTIVE:  /76 (BP Location: Right arm, Patient Position: Sitting, Cuff Size: Adult Regular)   Pulse 84   Temp 99  F (37.2  C) (Tympanic)   Resp 16   Ht 1.753 m (5' 9\")   Wt 94.8 kg (209 lb)   LMP  (LMP Unknown)   SpO2 99%   Breastfeeding No   BMI 30.86 kg/m      EXAM:  Constitutional: No acute distress. Well-groomed, well-hydrated and well-nourished.  Appears stated age.  Head: Normocephalic, atraumatic.  Eyes: EOMI, anicteric, PERRL  Ears: Normal TMs bilaterally. Normal auditory canals and external ears.   OroPharynx: Moist mucous membranes. Dental hygiene adequate. Normal buccal mucosa. Normal pharynx.  Neck: Supple, with no masses or nodes. No lymphadenopathy.  No thyromegaly.  Respiratory: Non-labored respirations.  Abdominal: Soft, nontender, non-distended. No abnormal masses or organomegaly.  GI: Bowel sounds are present.  Skin: Warm, dry, intact.  No concerning rashes or lesions.  Musculoskeletal: limited range of motion in knees, hips, back and neck due to pain. She has point tenderness in more than 10 areas symmetrically above and below the waist. Normal tone and strength throughout. Right knee with joint tenderness. No effusion. Minimal crepitus.   Neurologic: A+Ox3. CN 2-12 grossly intact. No focal motor or sensory deficits. No tremor.  Psychiatric: Does not appear anxious or depressed.    PROCEDURE: XR KNEE RT 3 VW 10/30/2020 2:25 PM  HISTORY: Primary osteoarthritis of right knee  COMPARISONS: None.  TECHNIQUE: 3 views.  FINDINGS: There is moderately severe narrowing of the medial joint  space. Lateral joint spaces fairly well preserved but small lateral  tibial plateau osteophyte is seen. Patellofemoral joint is fairly well  preserved.  No fracture is seen. There is probably a small joint " effusion. No  focal bone lesion is seen.                                                            IMPRESSION: Degenerative change in the medial compartment.    ASSESSMENT/PLAN:   1. Arthritis of right knee  Comment: Right knee arthritis did not improve with trial of steroid injection.  Plan:  -- Patient would like to pursue viscosupplementation.  -- Referral to Ortho placed at [facility].  -- PT recommended but declined.  -- Continue Tylenol as needed, avoid NSAIDs due to PUD    2. Fibromyalgia  Comment: Pt w/ point tenderness in 10+ spots symmetrically, with symptoms lasting >3 months. Meeting criteria.  Autoimmune work-up and rheumatoid factor were both negative.  Plan:  -Discussed with patient's risks and benefits of trial of gabapentin.  Patient is desiring trial.  Discussed potential side effect of drowsiness, which patient welcomes that she has trouble sleeping.  Return to clinic in 2 to 3 weeks to monitor her symptoms and potential side effects.  Consider titrating up at that point.  - gabapentin (NEURONTIN) 100 MG capsule; Take 1 capsule (100 mg) by mouth At Bedtime  Dispense: 30 capsule; Refill: 0  - reviewed and is appropriate.      Michaelle Simon MD  [facility]

## 2021-08-02 NOTE — NURSING NOTE
"Chief Complaint   Patient presents with     Knee Pain       Initial /76 (BP Location: Right arm, Patient Position: Sitting, Cuff Size: Adult Regular)   Pulse 84   Temp 99  F (37.2  C) (Tympanic)   Resp 16   Ht 1.753 m (5' 9\")   Wt 94.8 kg (209 lb)   LMP  (LMP Unknown)   SpO2 99%   Breastfeeding No   BMI 30.86 kg/m   Estimated body mass index is 30.86 kg/m  as calculated from the following:    Height as of this encounter: 1.753 m (5' 9\").    Weight as of this encounter: 94.8 kg (209 lb).  Medication Reconciliation: Completed     FOOD SECURITY SCREENING QUESTIONS  Hunger Vital Signs:  Within the past 12 months we worried whether our food would run out before we got money to buy more. Never  Within the past 12 months the food we bought just didn't last and we didn't have money to get more. Never  Ayleen Redmond LPN 8/2/2021 9:09 AM      Ayleen Redmond LPN  "

## 2021-08-04 PROBLEM — M79.7 FIBROMYALGIA: Status: ACTIVE | Noted: 2021-08-04

## 2021-08-23 ENCOUNTER — IMMUNIZATION (OUTPATIENT)
Dept: FAMILY MEDICINE | Facility: OTHER | Age: 56
End: 2021-08-23
Attending: FAMILY MEDICINE
Payer: COMMERCIAL

## 2021-08-23 PROCEDURE — 0002A PR COVID VAC PFIZER DIL RECON 30 MCG/0.3 ML IM: CPT

## 2021-09-09 ENCOUNTER — TELEPHONE (OUTPATIENT)
Dept: FAMILY MEDICINE | Facility: OTHER | Age: 56
End: 2021-09-09

## 2021-09-09 DIAGNOSIS — G43.719 INTRACTABLE CHRONIC MIGRAINE WITHOUT AURA AND WITHOUT STATUS MIGRAINOSUS: ICD-10-CM

## 2021-09-09 DIAGNOSIS — M17.11 PRIMARY OSTEOARTHRITIS OF RIGHT KNEE: Primary | ICD-10-CM

## 2021-09-09 DIAGNOSIS — G89.29 CHRONIC PAIN OF RIGHT KNEE: ICD-10-CM

## 2021-09-09 DIAGNOSIS — M25.561 CHRONIC PAIN OF RIGHT KNEE: ICD-10-CM

## 2021-09-09 NOTE — TELEPHONE ENCOUNTER
Patient called [facility] stating that she has an appt scheduled with Dr. Morgan on Monday the 20th. She states Dr. Simon was going to order an MRI of her right knee but that has not been done. Please order/advise.    Kelley Rincon on 9/9/2021 at 12:51 PM

## 2021-09-09 NOTE — TELEPHONE ENCOUNTER
Called and verified patient full name and . Notified patient that she needs to schedule an appt to discuss medication. Transferred to scheduling.     Kimmy Martinez LPN on 2021 at 2:47 PM

## 2021-09-09 NOTE — TELEPHONE ENCOUNTER
Patient called and is requesting an increase in the new medication she was prescribed on 8/2/21.    She feels like it is not strong enough or helping much.    Thank you  Ayleen Elam on 9/9/2021 at 12:47 PM

## 2021-09-09 NOTE — TELEPHONE ENCOUNTER
We disucussed referral to Ortho for viscosupplementation, after failed steroid injection. I said Ortho may want to order an MRI at that appointment. I will place the order for ortho to have to review prior to her appointment. Please let her know.  -Michaelle Simon MD

## 2021-09-10 DIAGNOSIS — M79.2 NEUROPATHIC PAIN: ICD-10-CM

## 2021-09-13 RX ORDER — BUTALBITAL, ACETAMINOPHEN AND CAFFEINE 50; 325; 40 MG/1; MG/1; MG/1
1 TABLET ORAL EVERY 4 HOURS PRN
Qty: 30 TABLET | Refills: 0 | Status: SHIPPED | OUTPATIENT
Start: 2021-09-13 | End: 2021-10-20

## 2021-09-13 RX ORDER — GABAPENTIN 100 MG/1
CAPSULE ORAL
Qty: 30 CAPSULE | Refills: 0 | Status: SHIPPED | OUTPATIENT
Start: 2021-09-13 | End: 2021-09-23

## 2021-09-13 NOTE — TELEPHONE ENCOUNTER
butalbital-acetaminophen-caffeine (ESGIC) -40 MG tablet     Sig: Take 1 tablet by mouth every 4 hours as needed for headaches           Last Written Prescription Date:  3/17/21  Last Fill Quantity: 30,   # refills: 4  Last Office Visit: 8/2/21  Future Office visit:    Next 5 appointments (look out 90 days)    Sep 23, 2021 11:00 AM  SHORT with Michaelle Simon MD  [facility] ([facility] ) [address]  [facility] [address]  [phone]           Routing refill request to provider for review/approval because:  Drug not on the [facility], [facility] or [facility] refill protocol or controlled substance Anayeli Smith RN on 9/13/2021 at 3:14 PM

## 2021-09-13 NOTE — TELEPHONE ENCOUNTER
gabapentin 100 mg capsule      Last Written Prescription Date:  8/2/21  Last Fill Quantity: 30,   # refills: 0  Last Office Visit: 8/2/21  Future Office visit:    Next 5 appointments (look out 90 days)    Sep 23, 2021 11:00 AM  SHORT with Michaelle Simon MD  [facility] ([facility] ) [address]  [place] [address] [phone]           Routing refill request to provider for review/approval because:  Drug not on the [facility], [facility] or [facility] refill protocol or controlled substance.  Unable to complete prescription refill per RNMedication Refill Policy.................... Darleen Blackmon RN ....................  9/13/2021   1:54 PM

## 2021-09-16 ENCOUNTER — TELEPHONE (OUTPATIENT)
Dept: FAMILY MEDICINE | Facility: OTHER | Age: 56
End: 2021-09-16

## 2021-09-16 ENCOUNTER — HOSPITAL ENCOUNTER (OUTPATIENT)
Dept: MRI IMAGING | Facility: OTHER | Age: 56
Discharge: HOME | End: 2021-09-16
Attending: FAMILY MEDICINE | Admitting: FAMILY MEDICINE
Payer: COMMERCIAL

## 2021-09-16 DIAGNOSIS — M25.561 CHRONIC PAIN OF RIGHT KNEE: ICD-10-CM

## 2021-09-16 DIAGNOSIS — M17.11 PRIMARY OSTEOARTHRITIS OF RIGHT KNEE: ICD-10-CM

## 2021-09-16 DIAGNOSIS — G43.719 INTRACTABLE CHRONIC MIGRAINE WITHOUT AURA AND WITHOUT STATUS MIGRAINOSUS: ICD-10-CM

## 2021-09-16 DIAGNOSIS — G89.29 CHRONIC PAIN OF RIGHT KNEE: ICD-10-CM

## 2021-09-16 PROCEDURE — 73721 MRI JNT OF LWR EXTRE W/O DYE: CPT | Mod: RT

## 2021-09-20 ENCOUNTER — OFFICE VISIT (OUTPATIENT)
Dept: ORTHOPEDICS | Facility: OTHER | Age: 56
End: 2021-09-20
Attending: FAMILY MEDICINE
Payer: COMMERCIAL

## 2021-09-20 VITALS
HEART RATE: 74 BPM | SYSTOLIC BLOOD PRESSURE: 130 MMHG | DIASTOLIC BLOOD PRESSURE: 80 MMHG | BODY MASS INDEX: 30.86 KG/M2 | RESPIRATION RATE: 16 BRPM | WEIGHT: 209 LBS | OXYGEN SATURATION: 98 %

## 2021-09-20 DIAGNOSIS — M17.11 ARTHRITIS OF RIGHT KNEE: Primary | ICD-10-CM

## 2021-09-20 PROCEDURE — 20610 DRAIN/INJ JOINT/BURSA W/O US: CPT | Mod: RT | Performed by: ORTHOPAEDIC SURGERY

## 2021-09-20 PROCEDURE — 99203 OFFICE O/P NEW LOW 30 MIN: CPT | Mod: 25 | Performed by: ORTHOPAEDIC SURGERY

## 2021-09-20 PROCEDURE — 250N000011 HC RX IP 250 OP 636: Performed by: ORTHOPAEDIC SURGERY

## 2021-09-20 PROCEDURE — G0463 HOSPITAL OUTPT CLINIC VISIT: HCPCS | Mod: 25

## 2021-09-20 PROCEDURE — G0463 HOSPITAL OUTPT CLINIC VISIT: HCPCS

## 2021-09-20 RX ADMIN — Medication 48 MG: at 10:51

## 2021-09-20 ASSESSMENT — PAIN SCALES - GENERAL: PAINLEVEL: MILD PAIN (3)

## 2021-09-20 NOTE — PROGRESS NOTES
A viscosupplementation injection was performed to the R knee using Synvisc One under sterile technique. This was well tolerated.  
Chief Complaint   Patient presents with     Consult     right knee pain       Lyudmila Crocker LPN    
Visit Date: 2021    A 56-year-old female with right knee osteoarthritis.    HISTORY OF PRESENT ILLNESS:  Kayla Bailey is a 56-year-old female with right knee osteoarthritis.  An MRI was ordered of her knee that showed that she had a meniscus tear of the medial compartment of her knee, but she had really no cartilage left on the inside of her knee as well.  She was referred to me for care.  She has had injections into her knee in the past, is seriously against any kind of surgical intervention for her knee and she specifically comes in requesting viscosupplementation injection to the right knee today.    PHYSICAL EXAMINATION:    GENERAL:  This is a 56-year-old female, in no acute distress, very pleasant on examination.    EXTREMITIES:  She has a full range of motion of the knee, some mild varus deformity.  She is otherwise neuro and vascularly intact.  Stable to varus and valgus stress with some mild medial opening.    Review of the MRI shows complete obliteration of the cartilage within the medial compartment of the knee.  There are significant osteophytes off of the medial femoral condyle as well.  There is, of course, a meniscus tear appreciated.    IMPRESSION AND PLAN:  A 56-year-old female with osteoarthritis of her knee.  We have given her a viscosupplementation injection into the knee today.  She tolerated it well, had good relief of her pain.  We are going to see her back on an as-needed basis for this.    Kirk Morgan MD        D: 2021   T: 2021   MT: [id number]    Name:     KAYLA BAILEY  MRN:      [id number]        Account:    [id number]   :      1965           Visit Date: 2021     Document: [id number]  
Yes

## 2021-09-21 RX ORDER — TOPIRAMATE 50 MG/1
50 TABLET, FILM COATED ORAL DAILY
Qty: 90 TABLET | Refills: 0 | Status: SHIPPED | OUTPATIENT
Start: 2021-09-21 | End: 2021-12-03

## 2021-09-21 NOTE — TELEPHONE ENCOUNTER
" Disp Refills Start End FOSTER   topiramate (TOPAMAX) 50 MG tablet 90 tablet 0 6/10/2021 12/7/2021 No   Sig - Route: Take 1 tablet (50 mg) by mouth daily - Oral       LOV: 8/2/2021  Future Office visit:    Next 5 appointments (look out 90 days)    Sep 23, 2021 11:00 AM  SHORT with Michaelle Simon MD  [facility] ([facility] ) [address]  [place] [id number] [phone]        Routing refill request to provider for review/approval because:  Failed protocol  Upcoming appointment  Limited refill    Requested Prescriptions   Pending Prescriptions Disp Refills     topiramate (TOPAMAX) 50 MG tablet [Pharmacy Med Name: topiramate 50 mg tablet] 90 tablet 0     Sig: Take 1 tablet (50 mg) by mouth daily       Anti-Seizure Meds Protocol  Failed - 9/16/2021  3:57 PM        Failed - Review Authorizing provider's last note.      Refer to last progress notes: confirm request is for original authorizing provider (cannot be through other providers).          Passed - Recent (12 mo) or future (30 days) visit within the authorizing provider's specialty     Patient has had an office visit with the authorizing provider or a provider within the authorizing providers department within the previous 12 mos or has a future within next 30 days. See \"Patient Info\" tab in inbasket, or \"Choose Columns\" in Meds & Orders section of the refill encounter.              Passed - Normal CBC on file in past 26 months     Recent Labs   Lab Test 11/19/20  1618   WBC 9.9   RBC 4.61   HGB 13.6   HCT 42.7                    Passed - Normal ALT or AST on file in past 26 months     Recent Labs   Lab Test 11/19/20  1618   ALT 32     Recent Labs   Lab Test 11/19/20  1618   AST 16             Passed - Normal platelet count on file in past 26 months     Recent Labs   Lab Test 11/19/20  1618                  Passed - Medication is active on med list        Passed - " No active pregnancy on record        Passed - No positive pregnancy test in last 12 months         Unable to complete prescription refill per RN Medication Refill Policy.................... Shagufta Garduno RN ....................  9/21/2021   8:24 AM

## 2021-09-23 ENCOUNTER — OFFICE VISIT (OUTPATIENT)
Dept: FAMILY MEDICINE | Facility: OTHER | Age: 56
End: 2021-09-23
Attending: FAMILY MEDICINE
Payer: COMMERCIAL

## 2021-09-23 VITALS
OXYGEN SATURATION: 98 % | BODY MASS INDEX: 29.95 KG/M2 | SYSTOLIC BLOOD PRESSURE: 134 MMHG | DIASTOLIC BLOOD PRESSURE: 80 MMHG | HEART RATE: 77 BPM | WEIGHT: 202.8 LBS | TEMPERATURE: 97.4 F | RESPIRATION RATE: 16 BRPM

## 2021-09-23 DIAGNOSIS — M79.7 FIBROMYALGIA: Primary | ICD-10-CM

## 2021-09-23 DIAGNOSIS — M79.2 NEUROPATHIC PAIN: ICD-10-CM

## 2021-09-23 PROCEDURE — 99213 OFFICE O/P EST LOW 20 MIN: CPT | Performed by: FAMILY MEDICINE

## 2021-09-23 PROCEDURE — G0463 HOSPITAL OUTPT CLINIC VISIT: HCPCS

## 2021-09-23 RX ORDER — GABAPENTIN 100 MG/1
CAPSULE ORAL
Qty: 120 CAPSULE | Refills: 1 | Status: SHIPPED | OUTPATIENT
Start: 2021-09-23 | End: 2021-12-09 | Stop reason: DRUGHIGH

## 2021-09-23 ASSESSMENT — PAIN SCALES - GENERAL: PAINLEVEL: MODERATE PAIN (4)

## 2021-09-23 NOTE — NURSING NOTE
Chief Complaint   Patient presents with     Recheck Medication     Would like to talk about an increase in her gabapentin.     Medication Reconciliation: complete    Kimmy Martinez LPN'

## 2021-09-23 NOTE — PROGRESS NOTES
Nursing Notes:   Kimmy Martinez LPN  9/23/2021 11:14 AM  Sign at exiting of workspace  Chief Complaint   Patient presents with     Recheck Medication     Would like to talk about an increase in her gabapentin.     Medication Reconciliation: complete    Kimmy JACOBS. DMITRI Martinez'       SUBJECTIVE:  HPI: Lilian Craft is a 56 year old female here for fibromyalgia recheck.    # Fibromyalgia: Pt w/ point tenderness in 10+ spots symmetrically, with symptoms lasting >3 months. Meeting criteria.  Autoimmune work-up and rheumatoid factor were both negative.  She was started on gabapentin 100 mg capsules at night at her last visit on 8/2.  Patient reports compliance with this medication.  She denies noting any side effects including dizziness or fatigue.  She denies feeling any improvement in her pain at this point.    She also has seen orthopedics since her last visit and got a gel injection for her knee.  A total knee replacement has been recommended due to severity of osteoarthritis.  Patient would like to avoid total knee surgery if possible.      Allergies:  Allergies   Allergen Reactions     Bee Venom Swelling     Nsaids Other (See Comments)     Bleeding ulcers, tar stools     ROS:  Constitutional, HEENT, cardiovascular, pulmonary, GI and  systems are negative, except as otherwise noted.    Past medical, surgical, and family history reviewed and updated as appropriate in the chart.  Relevant social history listed in HPI.    OBJECTIVE:  /80 (BP Location: Right arm, Patient Position: Sitting, Cuff Size: Adult Regular)   Pulse 77   Temp 97.4  F (36.3  C) (Tympanic)   Resp 16   Wt 92 kg (202 lb 12.8 oz)   LMP  (LMP Unknown)   SpO2 98%   Breastfeeding No   BMI 29.95 kg/m      EXAM:  Constitutional: No acute distress. Well-groomed, well-hydrated and well-nourished.  Appears stated age.  Head: Normocephalic, atraumatic.  Eyes: anicteric  Respiratory: Non-labored respirations.  Skin: Warm, dry, intact.  No  concerning rashes or lesions.  Musculoskeletal: limited range of motion in knees, hips, back and neck due to pain. She has point tenderness in more than 10 areas symmetrically above and below the waist. Normal tone and strength throughout. Right knee with joint tenderness. No effusion. Minimal crepitus.   Neurologic: A+Ox3. CN 2-12 grossly intact. No focal motor or sensory deficits. No tremor.  Psychiatric: Does not appear anxious or depressed.    ASSESSMENT/PLAN:   1. Fibromyalgia  2. Neuropathic pain  Comment: Pt w/ point tenderness in 10+ spots symmetrically, with symptoms lasting >3 months. Meeting criteria.  Autoimmune work-up and rheumatoid factor were both negative.  Patient tolerated initial trial of gabapentin well without side effects.  Patient did not note improvement in her symptoms.  Plan:  -Increase gabapentin from 100 mg to 300 mg at night before bed, and 100 mg in the morning  - gabapentin (NEURONTIN) 100 MG capsule; TAKE 3 CAPSULE BY MOUTH AT BEDTIME, and 1 capsule in the morning.  Dispense: 120 capsule; Refill: 1  -Return to clinic in 3 weeks for monitoring, consider titrating up if pain is not well controlled  - reviewed and is appropriate.    Michaelle Simon MD  [facility]

## 2021-10-14 ENCOUNTER — OFFICE VISIT (OUTPATIENT)
Dept: FAMILY MEDICINE | Facility: OTHER | Age: 56
End: 2021-10-14
Attending: FAMILY MEDICINE
Payer: COMMERCIAL

## 2021-10-14 VITALS
TEMPERATURE: 98.7 F | RESPIRATION RATE: 16 BRPM | BODY MASS INDEX: 29.98 KG/M2 | DIASTOLIC BLOOD PRESSURE: 90 MMHG | OXYGEN SATURATION: 99 % | WEIGHT: 203 LBS | HEART RATE: 83 BPM | SYSTOLIC BLOOD PRESSURE: 144 MMHG

## 2021-10-14 DIAGNOSIS — M79.7 FIBROMYALGIA: Primary | ICD-10-CM

## 2021-10-14 PROCEDURE — G0463 HOSPITAL OUTPT CLINIC VISIT: HCPCS

## 2021-10-14 PROCEDURE — 99213 OFFICE O/P EST LOW 20 MIN: CPT | Performed by: FAMILY MEDICINE

## 2021-10-14 RX ORDER — GABAPENTIN 300 MG/1
300 CAPSULE ORAL 3 TIMES DAILY
Qty: 90 CAPSULE | Refills: 0 | Status: SHIPPED | OUTPATIENT
Start: 2021-10-14 | End: 2021-11-01

## 2021-10-14 ASSESSMENT — PAIN SCALES - GENERAL: PAINLEVEL: MODERATE PAIN (4)

## 2021-10-14 NOTE — NURSING NOTE
Chief Complaint   Patient presents with     Recheck Medication     States that she thinks she is sleeping better at night with medication.     Medication Reconciliation: complete    Kimmy Martinez LPN

## 2021-10-14 NOTE — PROGRESS NOTES
Nursing Notes:   Kimmy Martinez LPN  10/14/2021 12:44 PM  Sign at exiting of workspace  Chief Complaint   Patient presents with     Recheck Medication     States that she thinks she is sleeping better at night with medication.     Medication Reconciliation: complete    Kimmy Martinez LPN       SUBJECTIVE:  HPI: Lilian Craft is a 56 year old female here for fibromyalgia recheck.    # Fibromyalgia: Pt w/ point tenderness in 10+ spots symmetrically, with symptoms lasting >3 months. Meeting criteria.  Autoimmune work-up and rheumatoid factor were both negative.  She was started on gabapentin 100 mg capsules at night at her visit on 8/2, and increased to 100mg in AM and 300mg in PM.  Patient reports compliance with this medication.  She denies noting any side effects including dizziness or fatigue.  She reports feeling some improvement in her pain at this point, and also improvement in her sleep. Does report waking up multiple times per night to pee and take care of her puppy. Pain is reported at 4/10.    She also has seen orthopedics since her last visit and got a gel injection for her knee. Reports minimal relief. A total knee replacement has been recommended due to severity of osteoarthritis.  Patient would like to avoid total knee surgery if possible.      Allergies:  Allergies   Allergen Reactions     Bee Venom Swelling     Nsaids Other (See Comments)     Bleeding ulcers, tar stools     ROS:  Constitutional, HEENT, cardiovascular, pulmonary, GI and  systems are negative, except as otherwise noted.    Past medical, surgical, and family history reviewed and updated as appropriate in the chart.  Relevant social history listed in HPI.    OBJECTIVE:  BP (!) 144/90 (BP Location: Right arm, Patient Position: Sitting, Cuff Size: Adult Regular)   Pulse 83   Temp 98.7  F (37.1  C) (Tympanic)   Resp 16   Wt 92.1 kg (203 lb)   LMP  (LMP Unknown)   SpO2 99%   Breastfeeding No   BMI 29.98 kg/m    Vitals:    10/14/21  1241 10/14/21 1244   BP: (!) 140/86 (!) 144/90     BP Readings from Last 6 Encounters:   10/14/21 (!) 144/90   09/23/21 134/80   09/20/21 130/80   08/02/21 132/76   12/18/20 (!) 127/91   12/15/20 (!) 148/98     EXAM:  Constitutional: No acute distress. Well-groomed, well-hydrated and well-nourished.  Appears stated age.  Head: Normocephalic, atraumatic.  Eyes: anicteric  Respiratory: Non-labored respirations.  Skin: Warm, dry, intact.  No concerning rashes or lesions.  Musculoskeletal: performed on 9/23  Neurologic: A+Ox3. CN 2-12 grossly intact. No focal motor or sensory deficits. No tremor.  Psychiatric: Does not appear anxious or depressed.    ASSESSMENT/PLAN:   1. Fibromyalgia  2. Neuropathic pain  Comment: Pt w/ point tenderness in 10+ spots symmetrically, with symptoms lasting >3 months. Meeting criteria.  Autoimmune work-up and rheumatoid factor were both negative.  Patient tolerated initial trial of gabapentin well without side effects.  Patient notes some improvement in her pain and also improvement in her sleep.  Plan:  -Increase gabapentin to 600 mg at night before bed, and 300 mg in the morning  -Return to clinic in 3 weeks for monitoring, consider titrating up if pain is not well controlled  -Titrate up to 1.2-2.4g/day  - reviewed and is appropriate.  - gabapentin (NEURONTIN) 300 MG capsule; Take 1 capsule (300 mg) by mouth 3 times daily Take one in the AM, and 2 before bed.  Dispense: 90 capsule; Refill: 0    3. Elevated BP w/o dx of HTN  -pt denies symptoms  -plan to recheck in 3 weeks  -RTC/ER precautions discussed     Michaelle Simon MD  [facility]

## 2021-10-18 DIAGNOSIS — G43.719 INTRACTABLE CHRONIC MIGRAINE WITHOUT AURA AND WITHOUT STATUS MIGRAINOSUS: ICD-10-CM

## 2021-10-20 RX ORDER — BUTALBITAL, ACETAMINOPHEN AND CAFFEINE 50; 325; 40 MG/1; MG/1; MG/1
1 TABLET ORAL EVERY 4 HOURS PRN
Qty: 30 TABLET | Refills: 0 | Status: SHIPPED | OUTPATIENT
Start: 2021-10-20 | End: 2021-12-09

## 2021-10-20 NOTE — TELEPHONE ENCOUNTER
Disp Refills Start End FOSTER   butalbital-acetaminophen-caffeine (ESGIC) -40 MG tablet 30 tablet 0 9/13/2021  No   Sig - Route: Take 1 tablet by mouth every 4 hours as needed for headaches - Oral       LOV: 10/14/2021  Future Office visit:    Next 5 appointments (look out 90 days)    Nov 01, 2021  9:40 AM  SHORT with Michaelle Simon MD  [facility] ([facility] ) [address]  [place] [address] [phone]        Routing refill request to provider for review/approval because:  Drug not on the [facility], [facility] or [facility] refill protocol or controlled substance    Requested Prescriptions   Pending Prescriptions Disp Refills     butalbital-acetaminophen-caffeine (ESGIC) -40 MG tablet [Pharmacy Med Name: butalbital-acetaminophen-caffeine 50 mg-325 mg-40 mg tablet] 30 tablet 0     Sig: TAKE 1 TABLET BY MOUTH EVERY 4 HOURS AS NEEDED for headaches       There is no refill protocol information for this order        Unable to complete prescription refill per RN Medication Refill Policy.................... Shagufta Garduno RN ....................  10/20/2021   3:38 PM

## 2021-11-01 ENCOUNTER — OFFICE VISIT (OUTPATIENT)
Dept: FAMILY MEDICINE | Facility: OTHER | Age: 56
End: 2021-11-01
Attending: FAMILY MEDICINE
Payer: COMMERCIAL

## 2021-11-01 VITALS
OXYGEN SATURATION: 99 % | TEMPERATURE: 98.9 F | SYSTOLIC BLOOD PRESSURE: 136 MMHG | WEIGHT: 202.4 LBS | RESPIRATION RATE: 16 BRPM | DIASTOLIC BLOOD PRESSURE: 84 MMHG | HEART RATE: 86 BPM | BODY MASS INDEX: 29.89 KG/M2

## 2021-11-01 DIAGNOSIS — M79.7 FIBROMYALGIA: Primary | ICD-10-CM

## 2021-11-01 PROCEDURE — G0463 HOSPITAL OUTPT CLINIC VISIT: HCPCS

## 2021-11-01 PROCEDURE — 99213 OFFICE O/P EST LOW 20 MIN: CPT | Performed by: FAMILY MEDICINE

## 2021-11-01 RX ORDER — PREGABALIN 75 MG/1
75 CAPSULE ORAL 2 TIMES DAILY
Qty: 60 CAPSULE | Refills: 0 | Status: SHIPPED | OUTPATIENT
Start: 2021-11-01 | End: 2021-12-09

## 2021-11-01 RX ORDER — GABAPENTIN 300 MG/1
600 CAPSULE ORAL 3 TIMES DAILY
Qty: 180 CAPSULE | Refills: 0 | Status: SHIPPED | OUTPATIENT
Start: 2021-11-01 | End: 2021-11-22

## 2021-11-01 ASSESSMENT — ANXIETY QUESTIONNAIRES
1. FEELING NERVOUS, ANXIOUS, OR ON EDGE: NOT AT ALL
GAD7 TOTAL SCORE: 0
GAD7 TOTAL SCORE: 0
5. BEING SO RESTLESS THAT IT IS HARD TO SIT STILL: NOT AT ALL
6. BECOMING EASILY ANNOYED OR IRRITABLE: NOT AT ALL
7. FEELING AFRAID AS IF SOMETHING AWFUL MIGHT HAPPEN: NOT AT ALL
7. FEELING AFRAID AS IF SOMETHING AWFUL MIGHT HAPPEN: NOT AT ALL
3. WORRYING TOO MUCH ABOUT DIFFERENT THINGS: NOT AT ALL
8. IF YOU CHECKED OFF ANY PROBLEMS, HOW DIFFICULT HAVE THESE MADE IT FOR YOU TO DO YOUR WORK, TAKE CARE OF THINGS AT HOME, OR GET ALONG WITH OTHER PEOPLE?: NOT DIFFICULT AT ALL
GAD7 TOTAL SCORE: 0
2. NOT BEING ABLE TO STOP OR CONTROL WORRYING: NOT AT ALL
4. TROUBLE RELAXING: NOT AT ALL

## 2021-11-01 ASSESSMENT — PATIENT HEALTH QUESTIONNAIRE - PHQ9
SUM OF ALL RESPONSES TO PHQ QUESTIONS 1-9: 6
10. IF YOU CHECKED OFF ANY PROBLEMS, HOW DIFFICULT HAVE THESE PROBLEMS MADE IT FOR YOU TO DO YOUR WORK, TAKE CARE OF THINGS AT HOME, OR GET ALONG WITH OTHER PEOPLE: NOT DIFFICULT AT ALL
SUM OF ALL RESPONSES TO PHQ QUESTIONS 1-9: 6

## 2021-11-01 ASSESSMENT — PAIN SCALES - GENERAL: PAINLEVEL: MODERATE PAIN (4)

## 2021-11-01 NOTE — NURSING NOTE
Chief Complaint   Patient presents with     Recheck Medication     States that she is noticing some improvement, but not much.     Medication Reconciliation: complete    Kimmy Martinez LPN

## 2021-11-01 NOTE — PROGRESS NOTES
Nursing Notes:   Kimmy Martinez LPN  11/1/2021  9:50 AM  Sign at exiting of workspace  Chief Complaint   Patient presents with     Recheck Medication     States that she is noticing some improvement, but not much.     Medication Reconciliation: complete    Kimmy Martinez LPN       SUBJECTIVE:  HPI: Lilian Craft is a 56 year old female here for medication for fibromyalgia recheck.    # Fibromyalgia: Pt w/ point tenderness in 10+ spots symmetrically, with symptoms lasting >3 months. Meeting criteria.  Autoimmune work-up and rheumatoid factor were both negative.  She was started on gabapentin 100 mg capsules at night at her visit on 8/2, and increased to 100mg in AM and 300mg in PM.  She tolerated the medication well and gabapentin was increased to 300 mg in the morning and lunchtime and 600 mg in the evening.  She reports noticing some improvement in her pain and some improvement with her insomnia and denies side effects, including dizziness or fatigue. Patient reports compliance with this medication. Pain is reported at 4/10.     # Insomnia: does report waking up multiple times per night to pee and take care of her puppy.    Allergies:  Allergies   Allergen Reactions     Bee Venom Swelling     Nsaids Other (See Comments)     Bleeding ulcers, tar stools     ROS:  Constitutional, HEENT, cardiovascular, pulmonary, GI and  systems are negative, except as otherwise noted.    Past medical, surgical, and family history reviewed and updated as appropriate in the chart.  Relevant social history listed in HPI.    OBJECTIVE:  /84 (BP Location: Right arm, Patient Position: Sitting, Cuff Size: Adult Regular)   Pulse 86   Temp 98.9  F (37.2  C) (Tympanic)   Resp 16   Wt 91.8 kg (202 lb 6.4 oz)   LMP  (LMP Unknown)   SpO2 99%   Breastfeeding No   BMI 29.89 kg/m    Vitals:    11/01/21 0949   BP: 136/84     BP Readings from Last 6 Encounters:   11/01/21 136/84   10/14/21 (!) 144/90   09/23/21 134/80   09/20/21  130/80   08/02/21 132/76   12/18/20 (!) 127/91     EXAM:  Constitutional: No acute distress. Well-groomed, well-hydrated and well-nourished.  Appears stated age.  Head: Normocephalic, atraumatic.  Eyes: anicteric  Respiratory: Non-labored respirations.  Skin: Warm, dry, intact.  No concerning rashes or lesions.  Musculoskeletal: performed on 9/23  Neurologic: A+Ox3. CN 2-12 grossly intact. No focal motor or sensory deficits. No tremor.  Psychiatric: Does not appear anxious or depressed.    ASSESSMENT/PLAN:   1. Fibromyalgia  2. Neuropathic pain  Comment: Pt w/ point tenderness in 10+ spots symmetrically, with symptoms lasting >3 months. Meeting criteria.  Autoimmune work-up and rheumatoid factor were both negative.  Patient tolerated initial trial of gabapentin well without side effects.  Patient notes some improvement in her pain and also improvement in her sleep.  Plan:  -Increase gabapentin to 600 mg in the morning, lunchtime and before bed  -Return to clinic in 3 weeks for monitoring, consider titrating up if pain is not well controlled  -Titrate up to 1.2-2.4g/day  - reviewed and is appropriate.  - gabapentin (NEURONTIN) 300 MG capsule; Take 2 capsule (300 mg) by mouth 3 times daily Take one in the AM, and 2 before bed.  Dispense: 180 capsule; Refill: 0    -Alternatively, we will see if Lyrica is covered by the patient's insurance and if so we will start that medicine in the future after adequate dosing trial of gabapentin.    3. Elevated BP w/o dx of HTN  -Blood pressure normalized compared to last visit  -pt denies symptoms  -plan to monitor closely and consider blood pressure cuff in the future  -RTC/ER precautions discussed     Michaelle Simon MD  [facility] AND [facility]PITAL    Answers for HPI/ROS submitted by the patient on 11/1/2021  If you checked off any problems, how difficult have these problems made it for you to do your work, take care of things at home, or get along with other  people?: Not difficult at all  PHQ9 TOTAL SCORE: 6  ROSANA 7 TOTAL SCORE: 0

## 2021-11-02 ASSESSMENT — ANXIETY QUESTIONNAIRES: GAD7 TOTAL SCORE: 0

## 2021-11-02 ASSESSMENT — PATIENT HEALTH QUESTIONNAIRE - PHQ9: SUM OF ALL RESPONSES TO PHQ QUESTIONS 1-9: 6

## 2021-11-22 ENCOUNTER — OFFICE VISIT (OUTPATIENT)
Dept: FAMILY MEDICINE | Facility: OTHER | Age: 56
End: 2021-11-22
Attending: FAMILY MEDICINE
Payer: COMMERCIAL

## 2021-11-22 VITALS
RESPIRATION RATE: 16 BRPM | SYSTOLIC BLOOD PRESSURE: 134 MMHG | WEIGHT: 205.2 LBS | HEART RATE: 80 BPM | OXYGEN SATURATION: 98 % | TEMPERATURE: 98.5 F | DIASTOLIC BLOOD PRESSURE: 80 MMHG | BODY MASS INDEX: 30.3 KG/M2

## 2021-11-22 DIAGNOSIS — M79.7 FIBROMYALGIA: Primary | ICD-10-CM

## 2021-11-22 PROCEDURE — 99213 OFFICE O/P EST LOW 20 MIN: CPT | Performed by: FAMILY MEDICINE

## 2021-11-22 PROCEDURE — G0463 HOSPITAL OUTPT CLINIC VISIT: HCPCS

## 2021-11-22 RX ORDER — GABAPENTIN 300 MG/1
600 CAPSULE ORAL 3 TIMES DAILY
Qty: 180 CAPSULE | Refills: 3 | Status: SHIPPED | OUTPATIENT
Start: 2021-11-22 | End: 2021-12-09

## 2021-11-22 ASSESSMENT — PAIN SCALES - GENERAL: PAINLEVEL: MODERATE PAIN (4)

## 2021-11-22 NOTE — PROGRESS NOTES
"Nursing Notes:   Kimmy Martinez LPN  11/22/2021 10:30 AM  Signed  Chief Complaint   Patient presents with     Recheck Medication     Doing well with the increase. States that she is happy with the change.     Medication Reconciliation: complete    Kimmy Martinez LPN       SUBJECTIVE:  HPI: Lilian Craft is a 56 year old female here for medication for fibromyalgia recheck.    # Fibromyalgia: Doing well. Increased dose of gabapentin at our last visit from 300 mg in the morning and lunchtime and 600 mg in the evening, to 600mg TID.  Patient reports compliance with this medication. Pain is reported at 4/10. She reports noticing significant improvement in her pain and some improvement with her insomnia and denies side effects, including dizziness or fatigue. Previously: \"Pt w/ point tenderness in 10+ spots symmetrically, with symptoms lasting >3 months. Meeting criteria.  Autoimmune work-up and rheumatoid factor were both negative.  She was started on gabapentin 100 mg capsules at night at her visit on 8/2, and increased to 100mg in AM and 300mg in PM.\"      Allergies:  Allergies   Allergen Reactions     Bee Venom Swelling     Nsaids Other (See Comments)     Bleeding ulcers, tar stools     ROS:  Constitutional, HEENT, cardiovascular, pulmonary, GI and  systems are negative, except as otherwise noted.    Past medical, surgical, and family history reviewed and updated as appropriate in the chart.  Relevant social history listed in HPI.    OBJECTIVE:  /80 (BP Location: Right arm, Patient Position: Sitting, Cuff Size: Adult Regular)   Pulse 80   Temp 98.5  F (36.9  C) (Tympanic)   Resp 16   Wt 93.1 kg (205 lb 3.2 oz)   LMP  (LMP Unknown)   SpO2 98%   Breastfeeding No   BMI 30.30 kg/m    Vitals:    11/22/21 0928   BP: 134/80     BP Readings from Last 6 Encounters:   11/22/21 134/80   11/01/21 136/84   10/14/21 (!) 144/90   09/23/21 134/80   09/20/21 130/80   08/02/21 132/76     EXAM:  Constitutional: No " acute distress. Well-groomed, well-hydrated and well-nourished.  Appears stated age.  Head: Normocephalic, atraumatic.  Eyes: anicteric  Respiratory: Non-labored respirations.  Skin: Warm, dry, intact.  No concerning rashes or lesions.  Musculoskeletal: performed on 9/23  Neurologic: A+Ox3. CN 2-12 grossly intact. No focal motor or sensory deficits. No tremor.  Psychiatric: Does not appear anxious or depressed.    ASSESSMENT/PLAN:   1. Fibromyalgia  2. Neuropathic pain  Comment: Pt w/ point tenderness in 10+ spots symmetrically, with symptoms lasting >3 months. Meeting criteria.  Autoimmune work-up and rheumatoid factor were both negative.  Patient tolerated initial trial of gabapentin well without side effects.  Patient notes some improvement in her pain and also improvement in her sleep.  Plan:  -Increase gabapentin to 600 mg TID  -Return to clinic in 3 weeks for monitoring, consider titrating up if pain is not well controlled  - reviewed and is appropriate.  - gabapentin (NEURONTIN) 300 MG capsule; Take 2 capsules (600 mg) by mouth 3 times daily Take one in the AM, and 2 before bed.  Dispense: 180 capsule; Refill: 3    Michaelle Simon MD  [facility] AND [facility]

## 2021-11-22 NOTE — NURSING NOTE
Chief Complaint   Patient presents with     Recheck Medication     Doing well with the increase. States that she is happy with the change.     Medication Reconciliation: complete    Kimmy Martinez LPN

## 2021-12-01 DIAGNOSIS — G43.719 INTRACTABLE CHRONIC MIGRAINE WITHOUT AURA AND WITHOUT STATUS MIGRAINOSUS: ICD-10-CM

## 2021-12-01 DIAGNOSIS — M79.7 FIBROMYALGIA: ICD-10-CM

## 2021-12-03 RX ORDER — TOPIRAMATE 50 MG/1
50 TABLET, FILM COATED ORAL DAILY
Qty: 90 TABLET | Refills: 1 | Status: SHIPPED | OUTPATIENT
Start: 2021-12-03 | End: 2021-12-09

## 2021-12-03 RX ORDER — PREGABALIN 75 MG/1
75 CAPSULE ORAL 2 TIMES DAILY
Qty: 60 CAPSULE | Refills: 0 | OUTPATIENT
Start: 2021-12-03

## 2021-12-03 RX ORDER — BUTALBITAL, ACETAMINOPHEN AND CAFFEINE 50; 325; 40 MG/1; MG/1; MG/1
TABLET ORAL
Qty: 30 TABLET | Refills: 0 | OUTPATIENT
Start: 2021-12-03

## 2021-12-03 NOTE — TELEPHONE ENCOUNTER
Disp Refills Start End FOSTER   topiramate (TOPAMAX) 50 MG tablet 90 tablet 0 9/21/2021 3/20/2022 No   Sig - Route: Take 1 tablet (50 mg) by mouth daily - Oral      Disp Refills Start End FOSTER   pregabalin (LYRICA) 75 MG capsule 60 capsule 0 11/1/2021  --   Sig - Route: Take 1 capsule (75 mg) by mouth 2 times daily - Oral      Disp Refills Start End FOSTER   butalbital-acetaminophen-caffeine (ESGIC) -40 MG tablet 30 tablet 0 10/20/2021  No   Sig - Route: TAKE 1 TABLET BY MOUTH EVERY 4 HOURS AS NEEDED for headaches - Oral       LOV: 11/22/2021  Future Office visit:    Next 5 appointments (look out 90 days)    Dec 06, 2021 10:20 AM  Office Visit with Austin Tejeda MD  [facility] ([facility] ) [address]  [place] [address] [phone]        Routing refill request to provider for review/approval because:  Drug not on the [facility], [facility] or [facility] refill protocol or controlled substance  Failed protocol  Upcoming appointment    Requested Prescriptions   Pending Prescriptions Disp Refills     butalbital-acetaminophen-caffeine (ESGIC) -40 MG tablet [Pharmacy Med Name: butalbital-acetaminophen-caffeine 50 mg-325 mg-40 mg tablet] 30 tablet 0     Sig: TAKE 1 TABLET BY MOUTH EVERY 4 HOURS AS NEEDED FOR HEADACHE       There is no refill protocol information for this order        topiramate (TOPAMAX) 50 MG tablet [Pharmacy Med Name: topiramate 50 mg tablet] 90 tablet 0     Sig: Take 1 tablet (50 mg) by mouth daily       Anti-Seizure Meds Protocol  Failed - 12/1/2021  4:30 PM        Failed - Review Authorizing provider's last note.      Refer to last progress notes: confirm request is for original authorizing provider (cannot be through other providers).          Passed - Recent (12 mo) or future (30 days) visit within the authorizing provider's specialty     Patient has had an office visit with the authorizing provider or a provider  "within the authorizing providers department within the previous 12 mos or has a future within next 30 days. See \"Patient Info\" tab in inbasket, or \"Choose Columns\" in Meds & Orders section of the refill encounter.              Passed - Normal CBC on file in past 26 months     Recent Labs   Lab Test 11/19/20  1618   WBC 9.9   RBC 4.61   HGB 13.6   HCT 42.7                    Passed - Normal ALT or AST on file in past 26 months     Recent Labs   Lab Test 11/19/20  1618   ALT 32     Recent Labs   Lab Test 11/19/20  1618   AST 16             Passed - Normal platelet count on file in past 26 months     Recent Labs   Lab Test 11/19/20  1618                  Passed - Medication is active on med list        Passed - No active pregnancy on record        Passed - No positive pregnancy test in last 12 months           pregabalin (LYRICA) 75 MG capsule [Pharmacy Med Name: pregabalin 75 mg capsule] 60 capsule 0     Sig: Take 1 capsule (75 mg) by mouth 2 times daily       There is no refill protocol information for this order      Unable to complete prescription refill per RN Medication Refill Policy.................... Shagufta Garduno RN ....................  12/3/2021   12:24 PM        "

## 2021-12-03 NOTE — TELEPHONE ENCOUNTER
Called and left message for patient to please return call.     Kimmy Martinez LPN on 12/3/2021 at 2:24 PM

## 2021-12-09 ENCOUNTER — OFFICE VISIT (OUTPATIENT)
Dept: FAMILY MEDICINE | Facility: OTHER | Age: 56
End: 2021-12-09
Attending: STUDENT IN AN ORGANIZED HEALTH CARE EDUCATION/TRAINING PROGRAM
Payer: COMMERCIAL

## 2021-12-09 VITALS
BODY MASS INDEX: 29.92 KG/M2 | SYSTOLIC BLOOD PRESSURE: 130 MMHG | DIASTOLIC BLOOD PRESSURE: 88 MMHG | OXYGEN SATURATION: 99 % | HEIGHT: 69 IN | HEART RATE: 88 BPM | RESPIRATION RATE: 16 BRPM | TEMPERATURE: 99.3 F | WEIGHT: 202 LBS

## 2021-12-09 DIAGNOSIS — M79.7 FIBROMYALGIA: ICD-10-CM

## 2021-12-09 DIAGNOSIS — G43.719 INTRACTABLE CHRONIC MIGRAINE WITHOUT AURA AND WITHOUT STATUS MIGRAINOSUS: ICD-10-CM

## 2021-12-09 PROCEDURE — 99213 OFFICE O/P EST LOW 20 MIN: CPT | Performed by: STUDENT IN AN ORGANIZED HEALTH CARE EDUCATION/TRAINING PROGRAM

## 2021-12-09 PROCEDURE — G0463 HOSPITAL OUTPT CLINIC VISIT: HCPCS

## 2021-12-09 RX ORDER — BUTALBITAL, ACETAMINOPHEN AND CAFFEINE 50; 325; 40 MG/1; MG/1; MG/1
1 TABLET ORAL EVERY 4 HOURS PRN
Qty: 30 TABLET | Refills: 0 | Status: SHIPPED | OUTPATIENT
Start: 2021-12-09 | End: 2021-12-09

## 2021-12-09 RX ORDER — PREGABALIN 75 MG/1
75 CAPSULE ORAL 2 TIMES DAILY
Qty: 60 CAPSULE | Refills: 2 | Status: SHIPPED | OUTPATIENT
Start: 2021-12-09 | End: 2022-03-09

## 2021-12-09 RX ORDER — TOPIRAMATE 50 MG/1
50 TABLET, FILM COATED ORAL DAILY
Qty: 90 TABLET | Refills: 3 | Status: SHIPPED | OUTPATIENT
Start: 2021-12-09 | End: 2023-01-11

## 2021-12-09 RX ORDER — BUTALBITAL, ACETAMINOPHEN AND CAFFEINE 50; 325; 40 MG/1; MG/1; MG/1
1 TABLET ORAL EVERY 4 HOURS PRN
Qty: 30 TABLET | Refills: 3 | Status: SHIPPED | OUTPATIENT
Start: 2021-12-09 | End: 2022-03-09

## 2021-12-09 RX ORDER — TOPIRAMATE 50 MG/1
50 TABLET, FILM COATED ORAL DAILY
Qty: 90 TABLET | Refills: 1 | Status: SHIPPED | OUTPATIENT
Start: 2021-12-09 | End: 2021-12-09

## 2021-12-09 RX ORDER — GABAPENTIN 300 MG/1
600 CAPSULE ORAL 3 TIMES DAILY
Qty: 180 CAPSULE | Refills: 3 | Status: SHIPPED | OUTPATIENT
Start: 2021-12-09 | End: 2022-03-09

## 2021-12-09 RX ORDER — GABAPENTIN 300 MG/1
600 CAPSULE ORAL 3 TIMES DAILY
Qty: 180 CAPSULE | Refills: 3 | Status: SHIPPED | OUTPATIENT
Start: 2021-12-09 | End: 2021-12-09

## 2021-12-09 RX ORDER — PREGABALIN 75 MG/1
75 CAPSULE ORAL 2 TIMES DAILY
Qty: 60 CAPSULE | Refills: 0 | Status: SHIPPED | OUTPATIENT
Start: 2021-12-09 | End: 2021-12-09

## 2021-12-09 ASSESSMENT — MIFFLIN-ST. JEOR: SCORE: 1570.65

## 2021-12-09 ASSESSMENT — PAIN SCALES - GENERAL: PAINLEVEL: MODERATE PAIN (4)

## 2021-12-09 NOTE — PROGRESS NOTES
"  Assessment & Plan     Fibromyalgia  Needs refills. Feels stable on this regimen. Staying active at home. Reviewed , follow up in 3 months, sooner if pain worsens    - pregabalin (LYRICA) 75 MG capsule; Take 1 capsule (75 mg) by mouth 2 times daily  - gabapentin (NEURONTIN) 300 MG capsule; Take 2 capsules (600 mg) by mouth 3 times daily Take one in the AM, and 2 before bed.    Intractable chronic migraine without aura and without status migrainosus  Needs refills. topamax daily.reviewed   - butalbital-acetaminophen-caffeine (ESGIC) -40 MG tablet; Take 1 tablet by mouth every 4 hours as needed for headaches  - topiramate (TOPAMAX) 50 MG tablet; Take 1 tablet (50 mg) by mouth daily      Austin Tejeda MD  [facility] AND \[facility] [place] [facility]\""    Subjective   Lilian is a 56 year old who presents for the following health issues     HPI     Med review   Previous PCP moved so is here to check in   Has all over body pain--feet, knees, neck, back due to both osteoarthritis and fibromyalgia.   Tries to stay active at home, doing exercises playing with dog   Has exercises from PT at home   No other concerns today, is due for refills   Feels stable on her current regimen for pain and for headaches, does not want to make changes             Review of Systems   Constitutional, HEENT, cardiovascular, pulmonary, gi and gu systems are negative, except as otherwise noted.      Objective    /88   Pulse 88   Temp 99.3  F (37.4  C) (Tympanic)   Resp 16   Ht 1.753 m (5' 9\")   Wt 91.6 kg (202 lb)   LMP  (LMP Unknown)   SpO2 99%   Breastfeeding No   BMI 29.83 kg/m    Body mass index is 29.83 kg/m .  Physical Exam   GENERAL: healthy, alert and no distress  RESP: no increased work of breathing   MS: left lateral great toe with well healing vertical scar. Moving arms and legs equally. Normal gait   SKIN: no suspicious lesions or rashes  NEURO: Normal strength and tone, mentation intact and speech normal  PSYCH: " mentation appears normal, affect normal/bright

## 2021-12-09 NOTE — NURSING NOTE
"Chief Complaint   Patient presents with     Recheck Medication     Establish Care         Initial /88   Pulse 88   Temp 99.3  F (37.4  C) (Tympanic)   Resp 16   Ht 1.753 m (5' 9\")   Wt 91.6 kg (202 lb)   LMP  (LMP Unknown)   SpO2 99%   Breastfeeding No   BMI 29.83 kg/m   Estimated body mass index is 29.83 kg/m  as calculated from the following:    Height as of this encounter: 1.753 m (5' 9\").    Weight as of this encounter: 91.6 kg (202 lb).     FOOD SECURITY SCREENING QUESTIONS  Hunger Vital Signs:  Within the past 12 months we worried whether our food would run out before we got money to buy more. Never  Within the past 12 months the food we bought just didn't last and we didn't have money to get more. Never      Medication Reconciliation: Complete      Norma J. Gosselin, LPN   "

## 2022-02-04 ENCOUNTER — IMMUNIZATION (OUTPATIENT)
Dept: FAMILY MEDICINE | Facility: OTHER | Age: 57
End: 2022-02-04
Attending: FAMILY MEDICINE
Payer: MEDICAID

## 2022-02-04 PROCEDURE — 91305 COVID-19,PF,PFIZER (12+ YRS): CPT

## 2022-03-09 ENCOUNTER — OFFICE VISIT (OUTPATIENT)
Dept: FAMILY MEDICINE | Facility: OTHER | Age: 57
End: 2022-03-09
Attending: STUDENT IN AN ORGANIZED HEALTH CARE EDUCATION/TRAINING PROGRAM
Payer: COMMERCIAL

## 2022-03-09 ENCOUNTER — TELEPHONE (OUTPATIENT)
Dept: FAMILY MEDICINE | Facility: OTHER | Age: 57
End: 2022-03-09
Payer: COMMERCIAL

## 2022-03-09 VITALS
WEIGHT: 195.25 LBS | HEART RATE: 91 BPM | TEMPERATURE: 97.1 F | DIASTOLIC BLOOD PRESSURE: 86 MMHG | OXYGEN SATURATION: 100 % | SYSTOLIC BLOOD PRESSURE: 144 MMHG | RESPIRATION RATE: 20 BRPM | HEIGHT: 69 IN | BODY MASS INDEX: 28.92 KG/M2

## 2022-03-09 DIAGNOSIS — G43.719 INTRACTABLE CHRONIC MIGRAINE WITHOUT AURA AND WITHOUT STATUS MIGRAINOSUS: ICD-10-CM

## 2022-03-09 DIAGNOSIS — M79.7 FIBROMYALGIA: Primary | ICD-10-CM

## 2022-03-09 PROCEDURE — 99214 OFFICE O/P EST MOD 30 MIN: CPT | Performed by: STUDENT IN AN ORGANIZED HEALTH CARE EDUCATION/TRAINING PROGRAM

## 2022-03-09 PROCEDURE — G0463 HOSPITAL OUTPT CLINIC VISIT: HCPCS

## 2022-03-09 RX ORDER — DULOXETIN HYDROCHLORIDE 30 MG/1
60 CAPSULE, DELAYED RELEASE ORAL DAILY
Qty: 60 CAPSULE | Refills: 1 | Status: SHIPPED | OUTPATIENT
Start: 2022-03-09 | End: 2023-01-11 | Stop reason: DRUGHIGH

## 2022-03-09 RX ORDER — BUTALBITAL, ACETAMINOPHEN AND CAFFEINE 50; 325; 40 MG/1; MG/1; MG/1
1 TABLET ORAL EVERY 4 HOURS PRN
Qty: 30 TABLET | Refills: 3 | Status: SHIPPED | OUTPATIENT
Start: 2022-03-09 | End: 2022-07-13

## 2022-03-09 RX ORDER — PREGABALIN 75 MG/1
75 CAPSULE ORAL 2 TIMES DAILY
Qty: 60 CAPSULE | Refills: 2 | Status: SHIPPED | OUTPATIENT
Start: 2022-03-09 | End: 2022-06-12

## 2022-03-09 RX ORDER — DULOXETIN HYDROCHLORIDE 60 MG/1
60 CAPSULE, DELAYED RELEASE ORAL DAILY
Qty: 60 CAPSULE | Refills: 1 | Status: SHIPPED | OUTPATIENT
Start: 2022-03-09 | End: 2022-07-05

## 2022-03-09 RX ORDER — GABAPENTIN 300 MG/1
600 CAPSULE ORAL 3 TIMES DAILY
Qty: 180 CAPSULE | Refills: 3 | Status: SHIPPED | OUTPATIENT
Start: 2022-03-09 | End: 2022-07-07

## 2022-03-09 ASSESSMENT — PAIN SCALES - GENERAL: PAINLEVEL: MODERATE PAIN (4)

## 2022-03-09 NOTE — PROGRESS NOTES
"  Assessment & Plan     Fibromyalgia  Fibromyalgia was previously controlled. Now worsening despite medication and lifestyle changes. Will start cymbalta. Start with 30 mg daily for 1 week then increase to 60 mg daily. Refill gabapentin and lyrica. Reviewed  and is appropriate.  - DULoxetine (CYMBALTA) 30 MG capsule; Take 2 capsules (60 mg) by mouth daily Start taking 30 mg nightly for 1 week, then increase to 60 mg nightly  - gabapentin (NEURONTIN) 300 MG capsule; Take 2 capsules (600 mg) by mouth 3 times daily Take one in the AM, and 2 before bed.  - pregabalin (LYRICA) 75 MG capsule; Take 1 capsule (75 mg) by mouth 2 times daily    Intractable chronic migraine without aura and without status migrainosus  Needs refill.   - butalbital-acetaminophen-caffeine (ESGIC) -40 MG tablet; Take 1 tablet by mouth every 4 hours as needed for headaches    BP elevated today, improved on repeat. Is in more pain than usual.   Follow up med check and BP check in 2 weeks    Austin Tejeda MD  [facility]   Lilian is a 56 year old who presents for the following health issues:  Medication managment    HPI     Fibromyalgia pain check  - pain everywhere. Was previously controlled with gabapentin 600 mg TID and lyrica 75 mg BID in addition to PRN tylenol but feels like pain worse   - is fairly active with outdoor chores, dog, exercise, home PT moves, and [employer]    BP   - elevated to 168/96 and on recheck 148/86  - asymptomatic but is in pain at baseline        Review of Systems   Constitutional, HEENT, cardiovascular, pulmonary, gi and gu systems are negative, except as otherwise noted.      Objective    BP (!) 168/96 (BP Location: Right arm, Patient Position: Sitting, Cuff Size: Adult Regular)   Pulse 91   Temp 97.1  F (36.2  C) (Tympanic)   Resp 20   Ht 1.753 m (5' 9\")   Wt 88.6 kg (195 lb 4 oz)   LMP  (LMP Unknown)   SpO2 100%   Breastfeeding No   BMI 28.83 kg/m    Body mass " index is 28.83 kg/m .  Physical Exam   GENERAL: healthy, alert and no distress  RESP: lungs clear to auscultation - no rales, rhonchi or wheezes  CV: regular rate and rhythm, normal S1 S2, no S3 or S4, no murmur, click or rub, no peripheral edema and peripheral pulses strong  MSK: tenderness to palpation along bilateral trapezius, paraspinal muscles thoracic and lumbar, biceps, IT band, quads bilaterally, right medial and lateral joint line of knee  More than left knee.

## 2022-03-09 NOTE — NURSING NOTE
Patient presents to clinic for follow up with medication management.  Medication Rec Complete  Kami Weiss LPN............3/9/2022 10:33 AM

## 2022-03-09 NOTE — TELEPHONE ENCOUNTER
Lilian's insurance will not cover 2 capsules of duloxetine. Filling original rx for qty of 7 and new rx for 60 mg to start after completing 30 mg capsules with qty and refills per original.  Per Collaborative Practice Agreement

## 2022-04-01 ENCOUNTER — OFFICE VISIT (OUTPATIENT)
Dept: FAMILY MEDICINE | Facility: OTHER | Age: 57
End: 2022-04-01
Attending: STUDENT IN AN ORGANIZED HEALTH CARE EDUCATION/TRAINING PROGRAM
Payer: COMMERCIAL

## 2022-04-01 VITALS
DIASTOLIC BLOOD PRESSURE: 102 MMHG | HEIGHT: 69 IN | BODY MASS INDEX: 27.9 KG/M2 | OXYGEN SATURATION: 99 % | HEART RATE: 87 BPM | RESPIRATION RATE: 20 BRPM | WEIGHT: 188.38 LBS | TEMPERATURE: 99.5 F | SYSTOLIC BLOOD PRESSURE: 148 MMHG

## 2022-04-01 DIAGNOSIS — Z78.0 MENOPAUSE: ICD-10-CM

## 2022-04-01 DIAGNOSIS — M79.7 FIBROMYALGIA: ICD-10-CM

## 2022-04-01 DIAGNOSIS — I10 PRIMARY HYPERTENSION: Primary | ICD-10-CM

## 2022-04-01 PROCEDURE — 99214 OFFICE O/P EST MOD 30 MIN: CPT | Performed by: STUDENT IN AN ORGANIZED HEALTH CARE EDUCATION/TRAINING PROGRAM

## 2022-04-01 PROCEDURE — G0463 HOSPITAL OUTPT CLINIC VISIT: HCPCS

## 2022-04-01 ASSESSMENT — ANXIETY QUESTIONNAIRES
2. NOT BEING ABLE TO STOP OR CONTROL WORRYING: NOT AT ALL
7. FEELING AFRAID AS IF SOMETHING AWFUL MIGHT HAPPEN: NOT AT ALL
7. FEELING AFRAID AS IF SOMETHING AWFUL MIGHT HAPPEN: NOT AT ALL
GAD7 TOTAL SCORE: 0
4. TROUBLE RELAXING: NOT AT ALL
GAD7 TOTAL SCORE: 0
6. BECOMING EASILY ANNOYED OR IRRITABLE: NOT AT ALL
1. FEELING NERVOUS, ANXIOUS, OR ON EDGE: NOT AT ALL
3. WORRYING TOO MUCH ABOUT DIFFERENT THINGS: NOT AT ALL
5. BEING SO RESTLESS THAT IT IS HARD TO SIT STILL: NOT AT ALL
GAD7 TOTAL SCORE: 0

## 2022-04-01 ASSESSMENT — PAIN SCALES - GENERAL: PAINLEVEL: MODERATE PAIN (4)

## 2022-04-01 ASSESSMENT — PATIENT HEALTH QUESTIONNAIRE - PHQ9
10. IF YOU CHECKED OFF ANY PROBLEMS, HOW DIFFICULT HAVE THESE PROBLEMS MADE IT FOR YOU TO DO YOUR WORK, TAKE CARE OF THINGS AT HOME, OR GET ALONG WITH OTHER PEOPLE: NOT DIFFICULT AT ALL
SUM OF ALL RESPONSES TO PHQ QUESTIONS 1-9: 3
SUM OF ALL RESPONSES TO PHQ QUESTIONS 1-9: 3

## 2022-04-01 NOTE — PROGRESS NOTES
"  Assessment & Plan     Primary hypertension  Patient declines medication therapy right now a she has normal BPs at home. Asymptomatic. We reviewed risk of untreated HTN (stroke, MI, kidney disease), reviewed signs and symptoms of when to seek care.     Menopause  Patient has gone through menopause 5+ years ago. Asymptomatic but wanting progesterone replacement which I do not think is appropriate. She is ok with a gyn referral to discuss   - Ob/Gyn Referral; Future    Fibromyalgia  Continue cymbalta for fibromylagia         Austin Tejeda MD  [facility]    Subjective   Lilian is a 56 year old who presents for the following health issues     HPI     Med check   - cymbalta started last visit  - has not noticed improvement in fibromyalgia pain, has also not noticed side effects  - hoping to be even more active outside now that the weather is warmer      BP check   - last visit was quite elevated  - today is 148/102   - she checks BP at home as her partner has high blood pressure and is usually in the 120s/70s  - she tells me she thinks it is white coat HTN  - no new headache, no chest pain or sob.     Hormone replacement therapy   - wanting to start progesterone pills for all over help with pain, skin, aging   - currently applying pro relief cream from [employer] made from yams  - her mom took progestin pills so she is interested in trying  - went through menopause 5+ years ago, no hot flashes, no vaginal bleeding, no vaginal dryness           Review of Systems   Constitutional, HEENT, cardiovascular, pulmonary, gi and gu systems are negative, except as otherwise noted.      Objective    BP (!) 148/102 (BP Location: Right arm, Patient Position: Sitting, Cuff Size: Adult Regular)   Pulse 87   Temp 99.5  F (37.5  C) (Tympanic)   Resp 20   Ht 1.753 m (5' 9\")   Wt 85.4 kg (188 lb 6 oz)   LMP  (LMP Unknown)   SpO2 99%   Breastfeeding No   BMI 27.82 kg/m    Body mass index is 27.82 " kg/m .  Physical Exam   GENERAL: healthy, alert and no distress  RESP: lungs clear to auscultation - no rales, rhonchi or wheezes  CV: regular rate and rhythm, normal S1 S2, no S3 or S4, no murmur, click or rub, no peripheral edema and peripheral pulses strong  MS: no gross musculoskeletal defects noted, no edema  PSYCH: mentation appears normal, affect normal/bright

## 2022-04-01 NOTE — NURSING NOTE
Patient presents to clinic for follow up with medication management and hypertension.  Home reading 120/68 last week.    Medication Rec Complete  Kami Weiss LPN............4/1/2022 11:11 AM

## 2022-04-02 ASSESSMENT — ANXIETY QUESTIONNAIRES: GAD7 TOTAL SCORE: 0

## 2022-04-02 ASSESSMENT — PATIENT HEALTH QUESTIONNAIRE - PHQ9: SUM OF ALL RESPONSES TO PHQ QUESTIONS 1-9: 3

## 2022-04-06 ENCOUNTER — TRANSFERRED RECORDS (OUTPATIENT)
Dept: HEALTH INFORMATION MANAGEMENT | Facility: OTHER | Age: 57
End: 2022-04-06
Payer: COMMERCIAL

## 2022-04-21 ENCOUNTER — OFFICE VISIT (OUTPATIENT)
Dept: OBGYN | Facility: OTHER | Age: 57
End: 2022-04-21
Attending: STUDENT IN AN ORGANIZED HEALTH CARE EDUCATION/TRAINING PROGRAM
Payer: COMMERCIAL

## 2022-04-21 VITALS
HEART RATE: 98 BPM | SYSTOLIC BLOOD PRESSURE: 142 MMHG | BODY MASS INDEX: 27.94 KG/M2 | WEIGHT: 189.2 LBS | DIASTOLIC BLOOD PRESSURE: 98 MMHG

## 2022-04-21 DIAGNOSIS — Z78.0 MENOPAUSE: ICD-10-CM

## 2022-04-21 PROCEDURE — 99203 OFFICE O/P NEW LOW 30 MIN: CPT | Performed by: STUDENT IN AN ORGANIZED HEALTH CARE EDUCATION/TRAINING PROGRAM

## 2022-04-21 PROCEDURE — G0463 HOSPITAL OUTPT CLINIC VISIT: HCPCS | Performed by: STUDENT IN AN ORGANIZED HEALTH CARE EDUCATION/TRAINING PROGRAM

## 2022-04-21 NOTE — NURSING NOTE
Pt presents to clinic today for consult on hormone therapy.       Medication Reconciliation: complete  Jennifer Zhang LPN

## 2022-04-21 NOTE — PROGRESS NOTES
Gynecology Office Visit    Chief Complaint: Vaginal Atrophy    HPI:    Lilian Craft is a 57 year old , here  to discuss vaginal atrophy. She reports her symptoms have been bothersome for a few months. She is asking if she would be a candidate for progesterone-only HRT as this was in the past what her mother was on.     We discussed that HRT is usually in the form of either estrogen-only, or estrogen + progesterone in the setting of women who still have a uterus. Progesterone-only is not a typical protocol. She denies any interest in any form of estrogen- she notes she never tolerated this well in the past with OCPs etc.     She is also not a candidate for HRT currently as she currently has HTN (but does note she has normotensive Bps at home and checks them) as well as current cigarette smoking. She understands the increased stroke risk associated with this combination and declines any interest in estrogen.     When asked about her most concerning symptoms, she notes that she is concerned about her bone health as her mother has history of osteoporosis, and vaginal dryness.     We discussed options for symptom management and treatment for vaginal atrophy. Symptom management is recommended to be tried first with use of vaginal moisturizers that can be used routinely, not just during sexual activity such as Vagisil moisturizer, Replens or K-Y Liquibeads. These can be used 2-3 times each week. During sexual activity, the addition of a lubricant can be helpful as well. All options such as water-based, glycerin-based and oil-based are appropriate, but we reviewed oil-based can break down latex condoms. If these did not work for her, the next step would be to utilize vaginal estrogen cream- but she may not want to use this. We talked about the risks/benefits of vaginal estrogen compared to the non-hormonal options to include a small increase in serum estrogen levels compared to postmenopausal women, but lower than if  she were to take oral estrogen supplementation.     OBHx  OB History    Para Term  AB Living   6 4 4 0 2 4   SAB IAB Ectopic Multiple Live Births   0 2 0 0 0      # Outcome Date GA Lbr Jose Armando/2nd Weight Sex Delivery Anes PTL Lv   6 IAB            5 IAB            4 Term            3 Term            2 Term            1 Term              GYN history:   Last pap smear: 2020 Unsatisfactory, HPV negative, No history of abnormal pap smears   She declined an exam today- pap is due for unsatisfactory collection- she has PCP appt in a few weeks, can get there or we can schedule pap-only visit  Menopausal    Past medical history:  Past Medical History:   Diagnosis Date     Borderline hypertension      H/O bee sting allergy      Osteoarthritis      PUD (peptic ulcer disease)      Seasonal allergies      Specifically denies VTE, DM or bleeding disorders    Past Surgical History:  Past Surgical History:   Procedure Laterality Date     COLONOSCOPY N/A 12/15/2020    1 hyperplastic, 1 tubular adenoma, follow up 5 years, 12/15/2025     RECONSTRUCT FOREFOOT WITH METATARSOPHALANGEAL (MTP) FUSION Left 2020    Procedure: RECONSTRUCTION, FOREFOOT, WITH MTP JOINT FUSION, 2nd metatarsal head resection;  Surgeon: Conrad Parish DPM;  Location:  OR         Medications:  Current Outpatient Medications   Medication     butalbital-acetaminophen-caffeine (ESGIC) -40 MG tablet     DULoxetine (CYMBALTA) 60 MG capsule     EPINEPHrine (EPIPEN/ADRENACLICK/OR ANY BX GENERIC EQUIV) 0.3 MG/0.3ML injection 2-pack     gabapentin (NEURONTIN) 300 MG capsule     pregabalin (LYRICA) 75 MG capsule     PROGESTERONE 100MG/G CREAM     topiramate (TOPAMAX) 50 MG tablet     DULoxetine (CYMBALTA) 30 MG capsule     polyethylene glycol-propylene glycol ([employer] LUBRICANT EYE DROPS) 0.4-0.3 % SOLN ophthalmic solution     No current facility-administered medications for this visit.         Allergies:       Allergies   Allergen Reactions      Bee Venom Swelling     Nsaids Other (See Comments)     Bleeding ulcers, tar stools         Social History:  Social History     Tobacco Use     Smoking status: Current Every Day Smoker     Packs/day: 0.25     Years: 20.00     Pack years: 5.00     Types: Cigarettes     Last attempt to quit: 3/18/2018     Years since quittin.0     Smokeless tobacco: Never Used     Tobacco comment: clove cigarettes   Vaping Use     Vaping Use: Never used   Substance Use Topics     Alcohol use: Yes     Comment: 1-2 per year     Drug use: No         Family History:  Family History   Problem Relation Age of Onset     Alzheimer Disease Mother      Other - See Comments Father         Old age 93     Alzheimer Disease Father      Pancreatic Cancer Sister      Other - See Comments Brother         Heroin OD       ROS:   Skin: negative for rash, bruising  Eyes: negative for visual blurring, double vision  Ears/Nose/Throat: negative for nasal congestion, vertigo  Respiratory: No shortness of breath, dyspnea on exertion, cough, or hemoptysis  Cardiovascular: negative for palpitations, chest pain, lower extremity edema and syncope or near-syncope  Gastrointestinal: negative for, nausea, vomiting and hematemesis  Genitourinary: negative for, dysuria, frequency and urgency  Musculoskeletal: negative for, back pain and muscular weakness  Neurologic: negative for, headaches, syncope, seizures and local weakness  Psychiatric: negative for, anxiety, depression and hallucinations  Hematologic/Lymphatic/Immunologic: negative for, anemia, chills and fever    Physical Exam  BP (!) 142/98   Pulse 98   Wt 85.8 kg (189 lb 3.2 oz)   LMP  (LMP Unknown)   BMI 27.94 kg/m    Gen: Well-appearing, no acute distressed, well-groomed, alert  HEENT: Normocephalic, atraumatic  Cardiovascular: Regular rate  Pulm: Symmetric chest rise, non-labored respirations  Pelvic: Deferred as she       Assessment/Plan  Lilian Craft is a 57 year old  female here for  "discussion of postmenopausal symptoms and exploring potential HRT options-- she is not interested in estrogen-containing options and is currently not a candidate for this in the setting of smoking and HTN.     # Vaginal atrophy  - Discussed options for non-hormonal symptom relief: ELAN jacob    # Concern for bone health  - her mother has history of osteoporosis: discussed that HRT is not recommended to be used for bone health and that progesterone-only is not an HRT model. She is not a current candidate for estrogen.  - Discussed that based on her first DEXA scan results. Discussed that usually these screenings start at age 65, but her primary provider may feel differently on starting to screen early.  - Emphasized Calcium and vitamin D as well as weight-bearing exercises regularly  - No history of fracture    # HTN  -- BP today 142/98 and 146/100 on recheck  -- she discussed that she feels this is \"white coat\" hypertension because her values are always normal at home. Discussed that she should keep a log of her values at home and bring this log with her to her next PCP appt in a few weeks to help determine if antihypertensive medication is appropriate. We discussed the risks of long-standing untreated hypertension and she voiced and understanding.     # Unsatisfactory Pap smear  -- Noted pap was only a few years ago- unfortunately review of the formal cytology report after she left showed it was an unsatisfactory result.   -- Exam was not performed today, but she is due for a pap smear. Will reach out to her provider for her upcoming PCP visit vs. Schedule a time for her to return for pap smear at our clinic.    Total amount of time spent during today's encounter including chart prep, face to face, documentation was 35 minutes     SHADIA MEDEL MD on 4/21/2022 at 11:37 AM                 "

## 2022-05-02 ENCOUNTER — OFFICE VISIT (OUTPATIENT)
Dept: FAMILY MEDICINE | Facility: OTHER | Age: 57
End: 2022-05-02
Attending: STUDENT IN AN ORGANIZED HEALTH CARE EDUCATION/TRAINING PROGRAM
Payer: COMMERCIAL

## 2022-05-02 VITALS
HEIGHT: 69 IN | RESPIRATION RATE: 20 BRPM | TEMPERATURE: 98.6 F | DIASTOLIC BLOOD PRESSURE: 105 MMHG | SYSTOLIC BLOOD PRESSURE: 150 MMHG | OXYGEN SATURATION: 100 % | HEART RATE: 84 BPM | BODY MASS INDEX: 28.33 KG/M2 | WEIGHT: 191.25 LBS

## 2022-05-02 DIAGNOSIS — I10 ESSENTIAL HYPERTENSION: Primary | ICD-10-CM

## 2022-05-02 DIAGNOSIS — Z00.00 HEALTH CARE MAINTENANCE: ICD-10-CM

## 2022-05-02 PROCEDURE — G0463 HOSPITAL OUTPT CLINIC VISIT: HCPCS

## 2022-05-02 PROCEDURE — 99214 OFFICE O/P EST MOD 30 MIN: CPT | Performed by: STUDENT IN AN ORGANIZED HEALTH CARE EDUCATION/TRAINING PROGRAM

## 2022-05-02 RX ORDER — LISINOPRIL/HYDROCHLOROTHIAZIDE 10-12.5 MG
1 TABLET ORAL DAILY
Qty: 30 TABLET | Refills: 3 | Status: SHIPPED | OUTPATIENT
Start: 2022-05-02 | End: 2022-08-30

## 2022-05-02 ASSESSMENT — PAIN SCALES - GENERAL: PAINLEVEL: MODERATE PAIN (4)

## 2022-05-02 NOTE — NURSING NOTE
Patient presents to clinic for follow up with medication management and hypertension.    Medication Rec Complete  Kami Weiss LPN............5/2/2022 11:08 AM

## 2022-05-02 NOTE — PROGRESS NOTES
"  Assessment & Plan     Essential hypertension  Has known HTN, previously was able to come off of BP meds. Elevated BP consistently in the office and at home, asymptomatic. Start Zestoretic 10-12.5 mg now, follow up in 3 weeks. Check BP at home. I think likely chronic pain contributing to elevated BP. Will check BMP at follow up  - lisinopril-hydrochlorothiazide (ZESTORETIC) 10-12.5 MG tablet; Take 1 tablet by mouth daily    Health care maintenance  Due for age related cancer screening, immunizations. Patient will schedule physical      Austin Tejeda MD  [facility]    Subjective   Lilian is a 57 year old who presents for the following health issues   Patient presents to clinic for follow up with medication management and hypertension.    HPI     Here today for follow up blood pressure  - has been elevated in clinic and now has been elevated at home  - she is ok with starting medication  - previously was taking lisinopril-hydrochlorothiazide with could response   - asymptomatic      Health care maintenance  - due for pap, Tdap booster, mammogram           Review of Systems   Constitutional, HEENT, cardiovascular, pulmonary, gi and gu systems are negative, except as otherwise noted.      Objective    BP (!) 150/105 (BP Location: Right arm, Patient Position: Sitting, Cuff Size: Adult Regular)   Pulse 84   Temp 98.6  F (37  C) (Tympanic)   Resp 20   Ht 1.753 m (5' 9\")   Wt 86.8 kg (191 lb 4 oz)   LMP  (LMP Unknown)   SpO2 100%   Breastfeeding No   BMI 28.24 kg/m    Body mass index is 28.24 kg/m .  Physical Exam   GENERAL: healthy, alert and no distress  RESP: lungs clear to auscultation - no rales, rhonchi or wheezes  CV: regular rate and rhythm, normal S1 S2, no S3 or S4, no murmur, click or rub, no peripheral edema and peripheral pulses strong  MS: no gross musculoskeletal defects noted, no edema  PSYCH: mentation appears normal, affect normal/bright                "

## 2022-06-10 DIAGNOSIS — M79.7 FIBROMYALGIA: ICD-10-CM

## 2022-06-10 NOTE — TELEPHONE ENCOUNTER
[facility] sent Rx request for the following:   pregabalin (LYRICA) 75 MG capsule  SigTake 1 capsule (75 mg) by mouth 2 times daily    Last Prescription Date:   03/09/2022  Last Fill Qty/Refills:         60, R-2    Last Office Visit:              05/02/2022 (Adalgisa Tejeda)   Future Office visit:           None noted    Routing refill request to provider for review/approval because:  Drug not on the [facility] or [facility] refill protocol or controlled substance    Unable to complete prescription refill per RN Medication Refill Policy.................... Vikki Martinez RN ....................  6/10/2022   10:06 AM

## 2022-06-12 RX ORDER — PREGABALIN 75 MG/1
75 CAPSULE ORAL 2 TIMES DAILY
Qty: 60 CAPSULE | Refills: 2 | Status: SHIPPED | OUTPATIENT
Start: 2022-06-12 | End: 2022-10-07

## 2022-07-05 DIAGNOSIS — M79.7 FIBROMYALGIA: ICD-10-CM

## 2022-07-06 RX ORDER — DULOXETIN HYDROCHLORIDE 60 MG/1
60 CAPSULE, DELAYED RELEASE ORAL DAILY
Qty: 90 CAPSULE | Refills: 3 | Status: SHIPPED | OUTPATIENT
Start: 2022-07-06 | End: 2023-07-11

## 2022-07-07 DIAGNOSIS — M79.7 FIBROMYALGIA: ICD-10-CM

## 2022-07-07 RX ORDER — GABAPENTIN 300 MG/1
600 CAPSULE ORAL 3 TIMES DAILY
Qty: 180 CAPSULE | Refills: 3 | Status: SHIPPED | OUTPATIENT
Start: 2022-07-07 | End: 2022-12-19

## 2022-07-07 NOTE — TELEPHONE ENCOUNTER
[facility] sent Rx request for the following:      gabapentin (NEURONTIN) 300 MG capsule      Last Prescription Date:   3/9/2022  Last Fill Qty/Refills:         180, R-3    Last Office Visit:              5/2/2022   Future Office visit:           none      Mehran Cabrera RN, BSN  ....................  7/7/2022   2:54 PM

## 2022-07-11 DIAGNOSIS — G43.719 INTRACTABLE CHRONIC MIGRAINE WITHOUT AURA AND WITHOUT STATUS MIGRAINOSUS: ICD-10-CM

## 2022-07-12 NOTE — TELEPHONE ENCOUNTER
[facility] sent Rx request for the following:      Requested Prescriptions   Pending Prescriptions Disp Refills     butalbital-acetaminophen-caffeine (ESGIC) -40 MG tablet 30 tablet 3     Sig: Take 1 tablet by mouth every 4 hours as needed for headaches   Last Prescription Date:   3/9/22  Last Fill Qty/Refills:         30, R-3    Last Office Visit:              5/2/22   Future Office visit:           None    Kaim Varela RN .............. 7/12/2022  10:43 AM

## 2022-07-13 RX ORDER — BUTALBITAL, ACETAMINOPHEN AND CAFFEINE 50; 325; 40 MG/1; MG/1; MG/1
1 TABLET ORAL EVERY 4 HOURS PRN
Qty: 30 TABLET | Refills: 3 | Status: SHIPPED | OUTPATIENT
Start: 2022-07-13 | End: 2022-12-19

## 2022-08-29 DIAGNOSIS — I10 ESSENTIAL HYPERTENSION: ICD-10-CM

## 2022-08-30 NOTE — TELEPHONE ENCOUNTER
[facility] sent Rx request for the following:      Requested Prescriptions   Pending Prescriptions Disp Refills     lisinopril-hydrochlorothiazide (ZESTORETIC) 10-12.5 MG tablet 30 tablet 3     Sig: Take 1 tablet by mouth daily       Diuretics (Including Combos) Protocol Failed - 8/30/2022 11:33 AM        Failed - Blood pressure under 140/90 in past 12 months     BP Readings from Last 3 Encounters:   05/02/22 (!) 150/105   04/21/22 (!) 142/98   04/01/22 (!) 148/102           Failed - Normal serum creatinine on file in past 12 months        Failed - Normal serum potassium on file in past 12 months        Failed - Normal serum sodium on file in past 12 months    ACE Inhibitors (Including Combos) Protocol Failed - 8/30/2022 11:33 AM        Failed - Blood pressure under 140/90 in past 12 months        Failed - Normal serum creatinine on file in past 12 months     Recent Labs   Lab Test 11/19/20  1618   CR 0.81           Failed - Normal serum potassium on file in past 12 months     Recent Labs   Lab Test 11/19/20  1618   POTASSIUM 3.9        Last Prescription Date:   5/2/22  Last Fill Qty/Refills:         30, R-3    Last Office Visit:              5/2/22   Future Office visit:           None    Per LOV note:  Essential hypertension  Has known HTN, previously was able to come off of BP meds. Elevated BP consistently in the office and at home, asymptomatic. Start Zestoretic 10-12.5 mg now, follow up in 3 weeks. Check BP at home. I think likely chronic pain contributing to elevated BP. Will check BMP at follow up    Called and spoke to Patient after verifying last name and date of birth. Pt reminded of the above information and was routed to scheduling line, to set up appointment:    Next 5 appointments (look out 90 days)    Sep 22, 2022 12:40 PM  PHYSICAL with Austin Tejeda MD  [facility] ([facility] ) [address]  [place]  [address]  [phone]        Patient requesting refill to get through until appointment. Kami Varela RN .............. 8/30/2022  1:23 PM

## 2022-08-31 RX ORDER — LISINOPRIL/HYDROCHLOROTHIAZIDE 10-12.5 MG
1 TABLET ORAL DAILY
Qty: 90 TABLET | Refills: 3 | Status: SHIPPED | OUTPATIENT
Start: 2022-08-31 | End: 2023-01-11

## 2022-10-05 DIAGNOSIS — M79.7 FIBROMYALGIA: ICD-10-CM

## 2022-10-07 RX ORDER — PREGABALIN 75 MG/1
75 CAPSULE ORAL 2 TIMES DAILY
Qty: 60 CAPSULE | Refills: 2 | Status: SHIPPED | OUTPATIENT
Start: 2022-10-07 | End: 2023-01-11

## 2022-10-07 NOTE — TELEPHONE ENCOUNTER
Last Prescription Date: 6/12/22  Last Qty/Refills: 60 / 2  Last Office Visit: 5/2/22  Future Office Visit: 10/28/22     Requested Prescriptions   Pending Prescriptions Disp Refills     pregabalin (LYRICA) 75 MG capsule 60 capsule 2     Sig: Take 1 capsule (75 mg) by mouth 2 times daily       There is no refill protocol information for this order

## 2022-12-19 DIAGNOSIS — M79.7 FIBROMYALGIA: ICD-10-CM

## 2022-12-19 DIAGNOSIS — G43.719 INTRACTABLE CHRONIC MIGRAINE WITHOUT AURA AND WITHOUT STATUS MIGRAINOSUS: ICD-10-CM

## 2022-12-19 RX ORDER — BUTALBITAL, ACETAMINOPHEN AND CAFFEINE 50; 325; 40 MG/1; MG/1; MG/1
1 TABLET ORAL EVERY 4 HOURS PRN
Qty: 30 TABLET | Refills: 3 | OUTPATIENT
Start: 2022-12-19

## 2022-12-19 RX ORDER — BUTALBITAL, ACETAMINOPHEN AND CAFFEINE 50; 325; 40 MG/1; MG/1; MG/1
1 TABLET ORAL EVERY 4 HOURS PRN
Qty: 30 TABLET | Refills: 0 | Status: SHIPPED | OUTPATIENT
Start: 2022-12-19 | End: 2023-03-16

## 2022-12-19 RX ORDER — GABAPENTIN 300 MG/1
600 CAPSULE ORAL 3 TIMES DAILY
Qty: 180 CAPSULE | Refills: 0 | Status: SHIPPED | OUTPATIENT
Start: 2022-12-19 | End: 2023-02-14

## 2022-12-19 NOTE — TELEPHONE ENCOUNTER
[facility] sent Rx request for the following:      Requested Prescriptions   Pending Prescriptions Disp Refills     gabapentin (NEURONTIN) 300 MG capsule 180 capsule 3     Sig: Take 2 capsules (600 mg) by mouth 3 times daily Take one in the AM, and 2 before bed.   Last Prescription Date:   7/7/22  Last Fill Qty/Refills:         180, R-3      Unclear sig- please update as appropriate.       butalbital-acetaminophen-caffeine (ESGIC) -40 MG tablet 30 tablet 3     Sig: Take 1 tablet by mouth every 4 hours as needed for headaches   Last Prescription Date:   7/13/22  Last Fill Qty/Refills:         30, R-3      Last Office Visit:              5/2/22   Future Office visit:           None    In clinical absence of patient's primary, Austin Rodríguez, patient is requesting that this message be sent to the covering provider for consideration please.    Kami Varela RN .............. 12/19/2022  11:05 AM

## 2022-12-19 NOTE — TELEPHONE ENCOUNTER
Comments: This prescription was filled today (12/19/22). Any refills authorized will be placed on file.     Kami Varela RN .............. 12/19/2022  9:54 AM

## 2022-12-20 ENCOUNTER — TELEPHONE (OUTPATIENT)
Dept: FAMILY MEDICINE | Facility: OTHER | Age: 57
End: 2022-12-20

## 2022-12-20 NOTE — TELEPHONE ENCOUNTER
We are deleting extra directions in gabapentin prescriptions. Patient has been taking 600 mg po tid. Take 2 in morning and one in evening are unnecessary directions

## 2023-01-05 DIAGNOSIS — G43.719 INTRACTABLE CHRONIC MIGRAINE WITHOUT AURA AND WITHOUT STATUS MIGRAINOSUS: ICD-10-CM

## 2023-01-09 RX ORDER — TOPIRAMATE 50 MG/1
50 TABLET, FILM COATED ORAL DAILY
Qty: 90 TABLET | Refills: 3 | OUTPATIENT
Start: 2023-01-09

## 2023-01-09 NOTE — TELEPHONE ENCOUNTER
[facility] sent Rx request for the following:      Requested Prescriptions   Pending Prescriptions Disp Refills     topiramate (TOPAMAX) 50 MG tablet 90 tablet 3     Sig: Take 1 tablet (50 mg) by mouth daily       Anti-Seizure Meds Protocol  Failed - 1/9/2023  4:15 PM        Failed - Review Authorizing provider's last note.      Refer to last progress notes: confirm request is for original authorizing provider (cannot be through other providers)        Failed - Normal CBC on file in past 26 months     Recent Labs   Lab Test 11/19/20  1618   WBC 9.9   RBC 4.61   HGB 13.6   HCT 42.7              Failed - Normal platelet count on file in past 26 months     Recent Labs   Lab Test 11/19/20  1618           Last Prescription Date:   12/9/21  Last Fill Qty/Refills:         90, R-3    Last Office Visit:              5/2/22   Future Office visit:             Next 5 appointments (look out 90 days)    Jan 11, 2023  9:00 AM  PHYSICAL with Austin Tejeda MD  [facility] ([facility] ) [address]  [place] [id number] [phone]        Called and spoke to Patient after verifying last name and date of birth. Pt states they have enough medication to get through until upcoming appointment. Refused, with note to pharmacy.    Kami Varela RN .............. 1/9/2023  4:19 PM

## 2023-01-10 DIAGNOSIS — M79.7 FIBROMYALGIA: ICD-10-CM

## 2023-01-10 NOTE — PROGRESS NOTES
Pre-Visit Planning   Next 5 appointments (look out 90 days)    Jan 11, 2023  9:00 AM  PHYSICAL with Austin Tejeda MD  [facility] ([facility] ) [address]  [place] [address] [phone]        Appointment Notes for this encounter:   Annual exam, med refills    Questionnaires Reviewed/Assigned  Additional questionnaires assigned: [id number]    Patient preferred phone number: [phone]      Contacted patient via phone/[employer]. Are there any additional questions or concerns you'd like to review with your provider during your visit? No  -patient refusing pneumococcal and zoster vaccines    Visit is preventive. Reviewed purpose of preventive visit with patient.    Meds  Home Meds reviewed and updated  Refills pended    Entered patient-preferred pharmacy.     Current Outpatient Medications   Medication     butalbital-acetaminophen-caffeine (ESGIC) -40 MG tablet     DULoxetine (CYMBALTA) 30 MG capsule     DULoxetine (CYMBALTA) 60 MG capsule     EPINEPHrine (EPIPEN/ADRENACLICK/OR ANY BX GENERIC EQUIV) 0.3 MG/0.3ML injection 2-pack     gabapentin (NEURONTIN) 300 MG capsule     lisinopril-hydrochlorothiazide (ZESTORETIC) 10-12.5 MG tablet     polyethylene glycol-propylene glycol ([employer] LUBRICANT EYE DROPS) 0.4-0.3 % SOLN ophthalmic solution     pregabalin (LYRICA) 75 MG capsule     PROGESTERONE 100MG/G CREAM     topiramate (TOPAMAX) 50 MG tablet     No current facility-administered medications for this visit.     MyChart  Patient is not active on [facility]     Questionnaire Review   Advised patient to arrive early in order to complete questionnaires.    Call Summary  Advised patient to call back at [phone] if needed.     Corinne R Thayer, RN on 1/10/2023 at 12:08 PM

## 2023-01-11 ENCOUNTER — OFFICE VISIT (OUTPATIENT)
Dept: FAMILY MEDICINE | Facility: OTHER | Age: 58
End: 2023-01-11
Attending: STUDENT IN AN ORGANIZED HEALTH CARE EDUCATION/TRAINING PROGRAM
Payer: COMMERCIAL

## 2023-01-11 VITALS
TEMPERATURE: 99.6 F | SYSTOLIC BLOOD PRESSURE: 164 MMHG | RESPIRATION RATE: 18 BRPM | OXYGEN SATURATION: 99 % | WEIGHT: 173 LBS | HEIGHT: 68 IN | HEART RATE: 78 BPM | BODY MASS INDEX: 26.22 KG/M2 | DIASTOLIC BLOOD PRESSURE: 90 MMHG

## 2023-01-11 DIAGNOSIS — Z00.00 ROUTINE GENERAL MEDICAL EXAMINATION AT A HEALTH CARE FACILITY: Primary | ICD-10-CM

## 2023-01-11 DIAGNOSIS — G43.719 INTRACTABLE CHRONIC MIGRAINE WITHOUT AURA AND WITHOUT STATUS MIGRAINOSUS: ICD-10-CM

## 2023-01-11 DIAGNOSIS — Z23 NEED FOR COVID-19 VACCINE: ICD-10-CM

## 2023-01-11 DIAGNOSIS — Z11.59 NEED FOR HEPATITIS C SCREENING TEST: ICD-10-CM

## 2023-01-11 DIAGNOSIS — M79.7 FIBROMYALGIA: ICD-10-CM

## 2023-01-11 DIAGNOSIS — Z23 NEED FOR TDAP VACCINATION: ICD-10-CM

## 2023-01-11 DIAGNOSIS — I10 ESSENTIAL HYPERTENSION: ICD-10-CM

## 2023-01-11 DIAGNOSIS — Z12.31 SCREENING MAMMOGRAM FOR HIGH-RISK PATIENT: ICD-10-CM

## 2023-01-11 DIAGNOSIS — Z12.4 SCREENING FOR CERVICAL CANCER: ICD-10-CM

## 2023-01-11 LAB
ANION GAP SERPL CALCULATED.3IONS-SCNC: 8 MMOL/L (ref 7–15)
BUN SERPL-MCNC: 8.1 MG/DL (ref 6–20)
CALCIUM SERPL-MCNC: 9.3 MG/DL (ref 8.6–10)
CHLORIDE SERPL-SCNC: 107 MMOL/L (ref 98–107)
CREAT SERPL-MCNC: 0.87 MG/DL (ref 0.51–0.95)
DEPRECATED HCO3 PLAS-SCNC: 25 MMOL/L (ref 22–29)
GFR SERPL CREATININE-BSD FRML MDRD: 77 ML/MIN/1.73M2
GLUCOSE SERPL-MCNC: 100 MG/DL (ref 70–99)
POTASSIUM SERPL-SCNC: 3.8 MMOL/L (ref 3.4–5.3)
SODIUM SERPL-SCNC: 140 MMOL/L (ref 136–145)

## 2023-01-11 PROCEDURE — 99214 OFFICE O/P EST MOD 30 MIN: CPT | Mod: 25 | Performed by: STUDENT IN AN ORGANIZED HEALTH CARE EDUCATION/TRAINING PROGRAM

## 2023-01-11 PROCEDURE — G0123 SCREEN CERV/VAG THIN LAYER: HCPCS | Performed by: STUDENT IN AN ORGANIZED HEALTH CARE EDUCATION/TRAINING PROGRAM

## 2023-01-11 PROCEDURE — G0463 HOSPITAL OUTPT CLINIC VISIT: HCPCS | Mod: 25

## 2023-01-11 PROCEDURE — 87624 HPV HI-RISK TYP POOLED RSLT: CPT | Mod: ZL | Performed by: STUDENT IN AN ORGANIZED HEALTH CARE EDUCATION/TRAINING PROGRAM

## 2023-01-11 PROCEDURE — 99396 PREV VISIT EST AGE 40-64: CPT | Performed by: STUDENT IN AN ORGANIZED HEALTH CARE EDUCATION/TRAINING PROGRAM

## 2023-01-11 PROCEDURE — 91312 COVID-19 VACCINE BIVALENT BOOSTER 12+ (PFIZER): CPT

## 2023-01-11 PROCEDURE — 80048 BASIC METABOLIC PNL TOTAL CA: CPT | Mod: ZL | Performed by: STUDENT IN AN ORGANIZED HEALTH CARE EDUCATION/TRAINING PROGRAM

## 2023-01-11 PROCEDURE — 36415 COLL VENOUS BLD VENIPUNCTURE: CPT | Mod: ZL | Performed by: STUDENT IN AN ORGANIZED HEALTH CARE EDUCATION/TRAINING PROGRAM

## 2023-01-11 RX ORDER — TOPIRAMATE 50 MG/1
50 TABLET, FILM COATED ORAL DAILY
Qty: 90 TABLET | Refills: 3 | Status: SHIPPED | OUTPATIENT
Start: 2023-01-11 | End: 2024-02-14

## 2023-01-11 RX ORDER — PREGABALIN 150 MG/1
150 CAPSULE ORAL 2 TIMES DAILY
Qty: 180 CAPSULE | Refills: 3 | Status: SHIPPED | OUTPATIENT
Start: 2023-01-11 | End: 2024-01-03

## 2023-01-11 RX ORDER — PREGABALIN 75 MG/1
75 CAPSULE ORAL 2 TIMES DAILY
Qty: 60 CAPSULE | Refills: 11 | Status: CANCELLED | OUTPATIENT
Start: 2023-01-11

## 2023-01-11 RX ORDER — PREGABALIN 75 MG/1
75 CAPSULE ORAL 2 TIMES DAILY
Qty: 60 CAPSULE | Refills: 2 | OUTPATIENT
Start: 2023-01-11

## 2023-01-11 RX ORDER — BUTALBITAL, ACETAMINOPHEN AND CAFFEINE 50; 325; 40 MG/1; MG/1; MG/1
1 TABLET ORAL EVERY 4 HOURS PRN
Qty: 30 TABLET | Refills: 3 | Status: CANCELLED | OUTPATIENT
Start: 2023-01-11

## 2023-01-11 RX ORDER — TOPIRAMATE 50 MG/1
50 TABLET, FILM COATED ORAL DAILY
Qty: 90 TABLET | Refills: 4 | Status: CANCELLED | OUTPATIENT
Start: 2023-01-11

## 2023-01-11 RX ORDER — LISINOPRIL AND HYDROCHLOROTHIAZIDE 20; 25 MG/1; MG/1
1 TABLET ORAL DAILY
Qty: 90 TABLET | Refills: 3 | Status: SHIPPED | OUTPATIENT
Start: 2023-01-11 | End: 2024-02-14

## 2023-01-11 RX ORDER — GABAPENTIN 300 MG/1
600 CAPSULE ORAL 3 TIMES DAILY
Qty: 180 CAPSULE | Refills: 11 | Status: CANCELLED | OUTPATIENT
Start: 2023-01-11

## 2023-01-11 ASSESSMENT — ENCOUNTER SYMPTOMS
COUGH: 0
CONSTIPATION: 0
CHILLS: 0
EYE PAIN: 0
ARTHRALGIAS: 1
FREQUENCY: 0
SORE THROAT: 0
PALPITATIONS: 0
BREAST MASS: 0
MYALGIAS: 0
NERVOUS/ANXIOUS: 0
WEAKNESS: 0
DIZZINESS: 0
HEMATOCHEZIA: 0
JOINT SWELLING: 1
HEMATURIA: 0
FEVER: 0
NAUSEA: 0
ABDOMINAL PAIN: 0
DIARRHEA: 0
PARESTHESIAS: 0
HEARTBURN: 0
SHORTNESS OF BREATH: 0
DYSURIA: 0
HEADACHES: 1

## 2023-01-11 ASSESSMENT — ANXIETY QUESTIONNAIRES
8. IF YOU CHECKED OFF ANY PROBLEMS, HOW DIFFICULT HAVE THESE MADE IT FOR YOU TO DO YOUR WORK, TAKE CARE OF THINGS AT HOME, OR GET ALONG WITH OTHER PEOPLE?: NOT DIFFICULT AT ALL
GAD7 TOTAL SCORE: 0
IF YOU CHECKED OFF ANY PROBLEMS ON THIS QUESTIONNAIRE, HOW DIFFICULT HAVE THESE PROBLEMS MADE IT FOR YOU TO DO YOUR WORK, TAKE CARE OF THINGS AT HOME, OR GET ALONG WITH OTHER PEOPLE: NOT DIFFICULT AT ALL
4. TROUBLE RELAXING: NOT AT ALL
2. NOT BEING ABLE TO STOP OR CONTROL WORRYING: NOT AT ALL
7. FEELING AFRAID AS IF SOMETHING AWFUL MIGHT HAPPEN: NOT AT ALL
3. WORRYING TOO MUCH ABOUT DIFFERENT THINGS: NOT AT ALL
5. BEING SO RESTLESS THAT IT IS HARD TO SIT STILL: NOT AT ALL
6. BECOMING EASILY ANNOYED OR IRRITABLE: NOT AT ALL
7. FEELING AFRAID AS IF SOMETHING AWFUL MIGHT HAPPEN: NOT AT ALL
1. FEELING NERVOUS, ANXIOUS, OR ON EDGE: NOT AT ALL

## 2023-01-11 ASSESSMENT — PAIN SCALES - GENERAL: PAINLEVEL: MILD PAIN (3)

## 2023-01-11 NOTE — TELEPHONE ENCOUNTER
Refill requests to be addressed at today's office visit. Rx refused, encounter closed. Lesia Ndiaye RN on 1/11/2023 at 8:10 AM    Last Prescription Date: 10/7/22  Last Qty/Refills: 60 / 2  Last Office Visit: 5/2/22 Adalgisa Tejeda  Future Office Visit: today     Requested Prescriptions   Pending Prescriptions Disp Refills     pregabalin (LYRICA) 75 MG capsule 60 capsule 2     Sig: Take 1 capsule (75 mg) by mouth 2 times daily       There is no refill protocol information for this order

## 2023-01-11 NOTE — LETTER
January 18, 2023      Lilian Craft  [address] [address]        Dear ,    We are writing to inform you of your test results.    Your test results fall within the expected range(s) or remain unchanged from previous results.  Please continue with current treatment plan. Pap normal and HPV negative, this means we repeat testing in 5 years per normal screening guidelines.     Resulted Orders   Pap Screen with HPV - recommended age 30 - 65 years   Result Value Ref Range    Interpretation        Negative for Intraepithelial Lesion or Malignancy (NILM)    Comment         Papanicolaou Test Limitations:  Cervical cytology is a screening test with limited sensitivity, and regular screening is critical for cancer prevention.  Pap tests are primarily effective for the diagnosis/prevention of squamous cell carcinoma, not adenocarcinoma or other cancers.        Specimen Adequacy       Satisfactory for evaluation, endocervical/transformation zone component present    Clinical Information       none      Reflex Testing Yes regardless of result     Previous Abnormal?       No      Performing Labs       The technical component of this testing was completed at [facility]     Basic Metabolic Panel   Result Value Ref Range    Sodium 140 136 - 145 mmol/L    Potassium 3.8 3.4 - 5.3 mmol/L    Chloride 107 98 - 107 mmol/L    Carbon Dioxide (CO2) 25 22 - 29 mmol/L    Anion Gap 8 7 - 15 mmol/L    Urea Nitrogen 8.1 6.0 - 20.0 mg/dL    Creatinine 0.87 0.51 - 0.95 mg/dL    Calcium 9.3 8.6 - 10.0 mg/dL    Glucose 100 (H) 70 - 99 mg/dL    GFR Estimate 77 >60 mL/min/1.73m2      Comment:      Effective December 21, 2021 eGFRcr in adults is calculated using the 2021 CKD-EPI creatinine equation which includes age and gender (Timothy calvillo al., NEJM, DOI: 10.1056/ZTLCbs9954143)   HPV High Risk Types DNA Cervical   Result Value Ref Range    Other HR HPV Negative Negative    HPV16 DNA Negative Negative     HPV18 DNA Negative Negative    FINAL DIAGNOSIS       This patient's sample is negative for HPV DNA.          METHODOLOGY: The Roche Jarrett 4800 system uses automated extraction, simultaneous amplification of HPV (L1 region) and beta-globin, followed by real time detection of fluorescent labeled HPV and beta globin using specific oligonucleotide probes. The test specifically identifies types HPV 16 DNA and HPV 18 DNA while concurrently detecting the rest of the high risk types (31, 33, 35, 39, 45, 51, 52, 56, 58, 59, 66 or 68).    COMMENTS: This test is not intended for use as a screening device for woman under age 30 with normal cervical cytology. Results should be correlated with cytologic and histologic findings. Close clinical followup is recommended.           If you have any questions or concerns, please call the clinic at the number listed above.       Sincerely,      Austin Tejeda MD

## 2023-01-11 NOTE — NURSING NOTE
Patient presents to clinic for physical exam.    Medication Rec Complete  Kami Weiss LPN............1/11/2023 9:06 AM

## 2023-01-16 LAB
BKR LAB AP GYN ADEQUACY: NORMAL
BKR LAB AP GYN INTERPRETATION: NORMAL
BKR LAB AP HPV REFLEX: NORMAL
BKR LAB AP PREVIOUS ABNORMAL: NORMAL
PATH REPORT.COMMENTS IMP SPEC: NORMAL
PATH REPORT.COMMENTS IMP SPEC: NORMAL
PATH REPORT.RELEVANT HX SPEC: NORMAL

## 2023-01-17 LAB
HUMAN PAPILLOMA VIRUS 16 DNA: NEGATIVE
HUMAN PAPILLOMA VIRUS 18 DNA: NEGATIVE
HUMAN PAPILLOMA VIRUS FINAL DIAGNOSIS: NORMAL
HUMAN PAPILLOMA VIRUS OTHER HR: NEGATIVE

## 2023-02-13 DIAGNOSIS — M79.7 FIBROMYALGIA: ICD-10-CM

## 2023-02-14 RX ORDER — GABAPENTIN 300 MG/1
CAPSULE ORAL
Qty: 180 CAPSULE | Refills: 0 | Status: SHIPPED | OUTPATIENT
Start: 2023-02-14 | End: 2023-03-27

## 2023-03-13 DIAGNOSIS — G43.719 INTRACTABLE CHRONIC MIGRAINE WITHOUT AURA AND WITHOUT STATUS MIGRAINOSUS: ICD-10-CM

## 2023-03-16 RX ORDER — BUTALBITAL, ACETAMINOPHEN AND CAFFEINE 50; 325; 40 MG/1; MG/1; MG/1
1 TABLET ORAL EVERY 4 HOURS PRN
Qty: 30 TABLET | Refills: 0 | Status: SHIPPED | OUTPATIENT
Start: 2023-03-16 | End: 2023-05-01

## 2023-03-16 NOTE — TELEPHONE ENCOUNTER
[facility] sent Rx request for the following:      Requested Prescriptions   Pending Prescriptions Disp Refills     butalbital-acetaminophen-caffeine (ESGIC) -40 MG tablet [Pharmacy Med Name: butalbital-acetaminophen-caffeine 50 mg-325 mg-40 mg tablet] 30 tablet 0     Sig: Take 1 tablet by mouth every 4 hours as needed for headaches     Last Prescription Date:   12/19/22  Last Fill Qty/Refills:         30, R-0    Last Office Visit:              1/11/23   Future Office visit:           None    In clinical absence of patient's primary, Austin Rodríguez, patient is requesting that this message be sent to the covering provider for consideration please.    Isaura Westfall RN on 3/16/2023 at 1:44 PM

## 2023-03-27 ENCOUNTER — TELEPHONE (OUTPATIENT)
Dept: FAMILY MEDICINE | Facility: OTHER | Age: 58
End: 2023-03-27
Payer: COMMERCIAL

## 2023-03-27 DIAGNOSIS — M79.7 FIBROMYALGIA: ICD-10-CM

## 2023-03-27 RX ORDER — GABAPENTIN 300 MG/1
600 CAPSULE ORAL 3 TIMES DAILY
Qty: 180 CAPSULE | Refills: 11 | Status: SHIPPED | OUTPATIENT
Start: 2023-03-27 | End: 2024-01-03

## 2023-03-27 NOTE — TELEPHONE ENCOUNTER
After proper verification, patient stated that she has been out of gabapentin for 13 days. She has [facility] for a pharmacy.  Vandana Alfaro LPN on 3/27/2023 at 12:48 PM

## 2023-03-27 NOTE — TELEPHONE ENCOUNTER
In our system we show a renewal for gabapentin was sent on 3/13/23. I do not see this refill request on the patient profile.She called today asking for a refill and stating she has been out for 3 days

## 2023-04-27 DIAGNOSIS — G43.719 INTRACTABLE CHRONIC MIGRAINE WITHOUT AURA AND WITHOUT STATUS MIGRAINOSUS: ICD-10-CM

## 2023-05-01 RX ORDER — BUTALBITAL, ACETAMINOPHEN AND CAFFEINE 50; 325; 40 MG/1; MG/1; MG/1
1 TABLET ORAL EVERY 4 HOURS PRN
Qty: 30 TABLET | Refills: 0 | Status: SHIPPED | OUTPATIENT
Start: 2023-05-01 | End: 2023-07-11

## 2023-05-01 NOTE — TELEPHONE ENCOUNTER
[facility] sent Rx request for the following:      Requested Prescriptions   Pending Prescriptions Disp Refills     butalbital-acetaminophen-caffeine (ESGIC) -40 MG tablet [Pharmacy Med Name: butalbital-acetaminophen-caffeine 50 mg-325 mg-40 mg tablet] 30 tablet 0     Sig: Take 1 tablet by mouth every 4 hours as needed for headaches   Last Prescription Date:   3/16/23  Last Fill Qty/Refills:         30, R-0    Last Office Visit:              1/11/23   Future Office visit:           None    Kami Varela RN .............. 5/1/2023  10:06 AM

## 2023-07-11 DIAGNOSIS — G43.719 INTRACTABLE CHRONIC MIGRAINE WITHOUT AURA AND WITHOUT STATUS MIGRAINOSUS: ICD-10-CM

## 2023-07-11 DIAGNOSIS — M79.7 FIBROMYALGIA: ICD-10-CM

## 2023-07-11 RX ORDER — BUTALBITAL, ACETAMINOPHEN AND CAFFEINE 50; 325; 40 MG/1; MG/1; MG/1
1 TABLET ORAL EVERY 4 HOURS PRN
Qty: 30 TABLET | Refills: 0 | Status: SHIPPED | OUTPATIENT
Start: 2023-07-11 | End: 2023-10-11

## 2023-07-11 RX ORDER — DULOXETIN HYDROCHLORIDE 60 MG/1
60 CAPSULE, DELAYED RELEASE ORAL DAILY
Qty: 90 CAPSULE | Refills: 0 | Status: SHIPPED | OUTPATIENT
Start: 2023-07-11 | End: 2023-10-30

## 2023-07-11 NOTE — TELEPHONE ENCOUNTER
I sent in 30 of the Esgic pills and refilled duloxetine.  She should schedule a follow-up appointment now to see Dr. Adalgisa Tejeda in the next month for any further refills of the Esgic pills  PDMP Review       Value Time User    State PDMP site checked  Yes 7/11/2023 12:40 PM Nader Storey MD

## 2023-07-11 NOTE — TELEPHONE ENCOUNTER
Last Prescription Date: 7/06/2022  Last Qty/Refills: 90 / R-3  Last Office Visit: 1/11/2023  Future Office Visit: None     Requested Prescriptions   Pending Prescriptions Disp Refills     DULoxetine (CYMBALTA) 60 MG capsule 90 capsule 3     Sig: Take 1 capsule (60 mg) by mouth daily Start after completing 30 mg capsules       Serotonin-Norepinephrine Reuptake Inhibitors  Failed - 7/11/2023 11:11 AM        Failed - Blood pressure under 140/90 in past 12 months     BP Readings from Last 3 Encounters:   01/11/23 (!) 164/90   05/02/22 (!) 150/105   04/21/22 (!) 142/98              Last Prescription Date: 5/01/2023  Last Qty/Refills: 30 / R-0  Last Office Visit: 1/11/2023  Future Office Visit: None       butalbital-acetaminophen-caffeine (ESGIC) -40 MG tablet 30 tablet 0     Sig: Take 1 tablet by mouth every 4 hours as needed for headaches       There is no refill protocol information for this order        Anamika Marion RN on 7/11/2023 at 11:15 AM

## 2023-07-20 NOTE — TELEPHONE ENCOUNTER
Called and left a message for a return call. Shawna Dean LPN .......................7/20/2023  11:22 AM

## 2023-08-01 NOTE — TELEPHONE ENCOUNTER
Left message to call and schedule a follow up appointment for further refills.   Jessica Fabian LPN ..........8/1/2023 11:14 AM

## 2023-09-06 ENCOUNTER — TRANSFERRED RECORDS (OUTPATIENT)
Dept: HEALTH INFORMATION MANAGEMENT | Facility: OTHER | Age: 58
End: 2023-09-06
Payer: COMMERCIAL

## 2023-10-03 DIAGNOSIS — G43.719 INTRACTABLE CHRONIC MIGRAINE WITHOUT AURA AND WITHOUT STATUS MIGRAINOSUS: ICD-10-CM

## 2023-10-10 NOTE — TELEPHONE ENCOUNTER
[facility] sent Rx request for the following:      Requested Prescriptions   Pending Prescriptions Disp Refills    butalbital-acetaminophen-caffeine (ESGIC) -40 MG tablet [Pharmacy Med Name: butalbital-acetaminophen-caffeine 50 mg-325 mg-40 mg tablet] 30 tablet 0     Sig: Take 1 tablet by mouth every 4 hours as needed for headaches       There is no refill protocol information for this order        Last Prescription Date:   7/11/23  Last Fill Qty/Refills:         30, R-0    Last Office Visit:              1/11/23   Future Office visit:           none     Alisson Galeas RN on 10/10/2023 at 9:35 AM

## 2023-10-11 RX ORDER — BUTALBITAL, ACETAMINOPHEN AND CAFFEINE 50; 325; 40 MG/1; MG/1; MG/1
1 TABLET ORAL EVERY 4 HOURS PRN
Qty: 30 TABLET | Refills: 0 | Status: SHIPPED | OUTPATIENT
Start: 2023-10-11 | End: 2024-01-03

## 2023-10-23 DIAGNOSIS — M79.7 FIBROMYALGIA: ICD-10-CM

## 2023-10-30 RX ORDER — DULOXETIN HYDROCHLORIDE 60 MG/1
60 CAPSULE, DELAYED RELEASE ORAL DAILY
Qty: 90 CAPSULE | Refills: 0 | Status: SHIPPED | OUTPATIENT
Start: 2023-10-30 | End: 2024-02-14

## 2023-10-30 NOTE — TELEPHONE ENCOUNTER
[facility] sent Rx request for the following:      Requested Prescriptions   Pending Prescriptions Disp Refills    DULoxetine (CYMBALTA) 60 MG capsule [Pharmacy Med Name: duloxetine 60 mg capsule,delayed release] 90 capsule 0     Sig: Take 1 capsule (60 mg) by mouth daily       Serotonin-Norepinephrine Reuptake Inhibitors  Failed - 10/30/2023 12:37 PM        Failed - Blood pressure under 140/90 in past 12 months     BP Readings from Last 3 Encounters:   01/11/23 (!) 164/90   05/02/22 (!) 150/105   04/21/22 (!) 142/98        Last Prescription Date:   7/11/23  Last Fill Qty/Refills:         90, R-0    Last Office Visit:              1/11/23   Future Office visit:           None    Unable to complete prescription refill per RN Medication Refill Policy.     Kami Varela RN .............. 10/30/2023  12:38 PM

## 2024-01-03 DIAGNOSIS — G43.719 INTRACTABLE CHRONIC MIGRAINE WITHOUT AURA AND WITHOUT STATUS MIGRAINOSUS: ICD-10-CM

## 2024-01-03 DIAGNOSIS — M79.7 FIBROMYALGIA: ICD-10-CM

## 2024-01-03 RX ORDER — PREGABALIN 150 MG/1
150 CAPSULE ORAL 2 TIMES DAILY
Qty: 180 CAPSULE | Refills: 0 | Status: SHIPPED | OUTPATIENT
Start: 2024-01-03 | End: 2024-01-23

## 2024-01-03 RX ORDER — BUTALBITAL, ACETAMINOPHEN AND CAFFEINE 50; 325; 40 MG/1; MG/1; MG/1
1 TABLET ORAL EVERY 4 HOURS PRN
Qty: 30 TABLET | Refills: 0 | Status: SHIPPED | OUTPATIENT
Start: 2024-01-03 | End: 2024-01-23

## 2024-01-03 RX ORDER — GABAPENTIN 300 MG/1
600 CAPSULE ORAL 3 TIMES DAILY
Qty: 180 CAPSULE | Refills: 0 | Status: SHIPPED | OUTPATIENT
Start: 2024-01-03 | End: 2024-01-23

## 2024-01-03 NOTE — TELEPHONE ENCOUNTER
Reason for call: Medication or medication refill    Name of medication requested: gabapentin-out, pregabalin-2 left, Esgic-3-4 pills left.     How many days of medication do you have left? out    What pharmacy do you use? [facility], would like them mailed to her.    Preferred method for responding to this message: Telephone Call    Phone number patient can be reached at: Home number on file [phone]    If we cannot reach you directly, may we leave a detailed response at the number you provided? Yes       **Patient states she has been requesting these since 12/26 and can not get through to the pharmacy at [facility].      Dedra Wong on 1/3/2024 at 10:26 AM

## 2024-01-03 NOTE — TELEPHONE ENCOUNTER
Patient called in requesting:     Requested Prescriptions   Pending Prescriptions Disp Refills    gabapentin (NEURONTIN) 300 MG capsule 180 capsule 11     Sig: Take 2 capsules (600 mg) by mouth 3 times daily       There is no refill protocol information for this order     Last Prescription Date:   3/27/23  Last Fill Qty/Refills:         180, R-11        pregabalin (LYRICA) 150 MG capsule 180 capsule 3     Sig: Take 1 capsule (150 mg) by mouth 2 times daily       There is no refill protocol information for this order     Last Prescription Date:   1/11/23  Last Fill Qty/Refills:         180, R-3        butalbital-acetaminophen-caffeine (ESGIC) -40 MG tablet 30 tablet 0     Sig: Take 1 tablet by mouth every 4 hours as needed for headaches       There is no refill protocol information for this order          Last Prescription Date:   10/11/23  Last Fill Qty/Refills:         30, R-0    Last Office Visit:              1/11/23   Future Office visit:           none    Routing refill request to provider for review/approval because:  Drug not on the [facility] refill protocol     JOSTIN SAUCEDA RN on 1/3/2024 at 10:35 AM

## 2024-01-04 NOTE — TELEPHONE ENCOUNTER
Called patient and informed her of provider's message. Patient in unable to make an appt at this time but states she will call back when she is near her calendar. Pt also requested that writer ask [facility] pharmacy to make sure they mail them to her. Writer called [facility] and set that up for  her.     JOSTIN SAUCEDA RN on 1/4/2024 at 8:34 AM

## 2024-01-23 DIAGNOSIS — G43.719 INTRACTABLE CHRONIC MIGRAINE WITHOUT AURA AND WITHOUT STATUS MIGRAINOSUS: ICD-10-CM

## 2024-01-23 DIAGNOSIS — M79.7 FIBROMYALGIA: ICD-10-CM

## 2024-01-23 NOTE — TELEPHONE ENCOUNTER
Reason for call: Medication or medication refill    Name of medication requested: Gabapentin, pregabalin, butalbital    How many days of medication do you have left? Needs refill on 2/3/24 - will call two days before to the pharmacy to refill. She does have her annual exam scheduled for 2/14/24.    What pharmacy do you use? [facility]    Preferred method for responding to this message: Telephone Call    Phone number patient can be reached at: Cell number on file:    Telephone Information:   Mobile [phone]       If we cannot reach you directly, may we leave a detailed response at the number you provided? Yes     Kelley Rincon on 1/23/2024 at 9:52 AM

## 2024-01-23 NOTE — TELEPHONE ENCOUNTER
Patient called in requesting:     Requested Prescriptions   Pending Prescriptions Disp Refills    butalbital-acetaminophen-caffeine (ESGIC) -40 MG tablet 30 tablet 0     Sig: Take 1 tablet by mouth every 4 hours as needed for headaches       There is no refill protocol information for this order       gabapentin (NEURONTIN) 300 MG capsule 180 capsule 0     Sig: Take 2 capsules (600 mg) by mouth 3 times daily       There is no refill protocol information for this order       pregabalin (LYRICA) 150 MG capsule 180 capsule 0     Sig: Take 1 capsule (150 mg) by mouth 2 times daily       There is no refill protocol information for this order          Last Prescription Date:   1/3/24  Last Fill Qty/Refills:         30, 180, R-0    Last Office Visit:              1/11/23   Future Office visit:           2/14/24    Routing refill request to provider for review/approval because:  Drug not on the [facility] [place] [place] refill protocol     JOSTIN SAUCEDA RN on 1/23/2024 at 11:29 AM

## 2024-01-24 RX ORDER — PREGABALIN 150 MG/1
150 CAPSULE ORAL 2 TIMES DAILY
Qty: 180 CAPSULE | Refills: 0 | Status: SHIPPED | OUTPATIENT
Start: 2024-01-24 | End: 2024-02-14

## 2024-01-24 RX ORDER — BUTALBITAL, ACETAMINOPHEN AND CAFFEINE 50; 325; 40 MG/1; MG/1; MG/1
1 TABLET ORAL EVERY 4 HOURS PRN
Qty: 30 TABLET | Refills: 0 | Status: SHIPPED | OUTPATIENT
Start: 2024-01-24 | End: 2024-02-14

## 2024-01-24 RX ORDER — GABAPENTIN 300 MG/1
600 CAPSULE ORAL 3 TIMES DAILY
Qty: 180 CAPSULE | Refills: 0 | Status: SHIPPED | OUTPATIENT
Start: 2024-01-24 | End: 2024-02-14

## 2024-02-14 ENCOUNTER — OFFICE VISIT (OUTPATIENT)
Dept: FAMILY MEDICINE | Facility: OTHER | Age: 59
End: 2024-02-14
Attending: STUDENT IN AN ORGANIZED HEALTH CARE EDUCATION/TRAINING PROGRAM
Payer: COMMERCIAL

## 2024-02-14 VITALS
HEIGHT: 68 IN | RESPIRATION RATE: 20 BRPM | OXYGEN SATURATION: 98 % | HEART RATE: 93 BPM | WEIGHT: 165 LBS | BODY MASS INDEX: 25.01 KG/M2 | SYSTOLIC BLOOD PRESSURE: 154 MMHG | DIASTOLIC BLOOD PRESSURE: 82 MMHG | TEMPERATURE: 98.8 F

## 2024-02-14 DIAGNOSIS — I10 ESSENTIAL HYPERTENSION: ICD-10-CM

## 2024-02-14 DIAGNOSIS — G43.719 INTRACTABLE CHRONIC MIGRAINE WITHOUT AURA AND WITHOUT STATUS MIGRAINOSUS: ICD-10-CM

## 2024-02-14 DIAGNOSIS — Z00.00 ROUTINE GENERAL MEDICAL EXAMINATION AT A HEALTH CARE FACILITY: Primary | ICD-10-CM

## 2024-02-14 DIAGNOSIS — M77.42 METATARSALGIA OF LEFT FOOT: ICD-10-CM

## 2024-02-14 DIAGNOSIS — M79.7 FIBROMYALGIA: ICD-10-CM

## 2024-02-14 DIAGNOSIS — M47.26 OSTEOARTHRITIS OF SPINE WITH RADICULOPATHY, LUMBAR REGION: ICD-10-CM

## 2024-02-14 DIAGNOSIS — G89.4 CHRONIC PAIN SYNDROME: ICD-10-CM

## 2024-02-14 DIAGNOSIS — M62.830 LUMBAR PARASPINAL MUSCLE SPASM: ICD-10-CM

## 2024-02-14 DIAGNOSIS — M54.16 SPINAL STENOSIS OF LUMBAR REGION WITH RADICULOPATHY: ICD-10-CM

## 2024-02-14 DIAGNOSIS — M48.061 SPINAL STENOSIS OF LUMBAR REGION WITH RADICULOPATHY: ICD-10-CM

## 2024-02-14 LAB
ALBUMIN SERPL BCG-MCNC: 5.2 G/DL (ref 3.5–5.2)
ALP SERPL-CCNC: 115 U/L (ref 40–150)
ALT SERPL W P-5'-P-CCNC: 21 U/L (ref 0–50)
ANION GAP SERPL CALCULATED.3IONS-SCNC: 12 MMOL/L (ref 7–15)
AST SERPL W P-5'-P-CCNC: 21 U/L (ref 0–45)
BASOPHILS # BLD AUTO: 0.1 10E3/UL (ref 0–0.2)
BASOPHILS NFR BLD AUTO: 1 %
BILIRUB SERPL-MCNC: 0.6 MG/DL
BUN SERPL-MCNC: 17.8 MG/DL (ref 6–20)
CALCIUM SERPL-MCNC: 10.4 MG/DL (ref 8.6–10)
CHLORIDE SERPL-SCNC: 100 MMOL/L (ref 98–107)
CHOLEST SERPL-MCNC: 248 MG/DL
CREAT SERPL-MCNC: 0.97 MG/DL (ref 0.51–0.95)
DEPRECATED HCO3 PLAS-SCNC: 26 MMOL/L (ref 22–29)
EGFRCR SERPLBLD CKD-EPI 2021: 67 ML/MIN/1.73M2
EOSINOPHIL # BLD AUTO: 0 10E3/UL (ref 0–0.7)
EOSINOPHIL NFR BLD AUTO: 0 %
ERYTHROCYTE [DISTWIDTH] IN BLOOD BY AUTOMATED COUNT: 12.4 % (ref 10–15)
FASTING STATUS PATIENT QL REPORTED: ABNORMAL
GLUCOSE SERPL-MCNC: 115 MG/DL (ref 70–99)
HCT VFR BLD AUTO: 39.7 % (ref 35–47)
HDLC SERPL-MCNC: 105 MG/DL
HGB BLD-MCNC: 13.1 G/DL (ref 11.7–15.7)
IMM GRANULOCYTES # BLD: 0 10E3/UL
IMM GRANULOCYTES NFR BLD: 0 %
LDLC SERPL CALC-MCNC: 129 MG/DL
LYMPHOCYTES # BLD AUTO: 2.3 10E3/UL (ref 0.8–5.3)
LYMPHOCYTES NFR BLD AUTO: 27 %
MCH RBC QN AUTO: 30.5 PG (ref 26.5–33)
MCHC RBC AUTO-ENTMCNC: 33 G/DL (ref 31.5–36.5)
MCV RBC AUTO: 92 FL (ref 78–100)
MONOCYTES # BLD AUTO: 0.4 10E3/UL (ref 0–1.3)
MONOCYTES NFR BLD AUTO: 5 %
NEUTROPHILS # BLD AUTO: 5.8 10E3/UL (ref 1.6–8.3)
NEUTROPHILS NFR BLD AUTO: 67 %
NONHDLC SERPL-MCNC: 143 MG/DL
NRBC # BLD AUTO: 0 10E3/UL
NRBC BLD AUTO-RTO: 0 /100
PLATELET # BLD AUTO: 400 10E3/UL (ref 150–450)
POTASSIUM SERPL-SCNC: 4.6 MMOL/L (ref 3.4–5.3)
PROT SERPL-MCNC: 8.4 G/DL (ref 6.4–8.3)
RBC # BLD AUTO: 4.3 10E6/UL (ref 3.8–5.2)
SODIUM SERPL-SCNC: 138 MMOL/L (ref 135–145)
TRIGL SERPL-MCNC: 69 MG/DL
WBC # BLD AUTO: 8.6 10E3/UL (ref 4–11)

## 2024-02-14 PROCEDURE — 99214 OFFICE O/P EST MOD 30 MIN: CPT | Mod: 25 | Performed by: STUDENT IN AN ORGANIZED HEALTH CARE EDUCATION/TRAINING PROGRAM

## 2024-02-14 PROCEDURE — 85025 COMPLETE CBC W/AUTO DIFF WBC: CPT | Mod: ZL | Performed by: STUDENT IN AN ORGANIZED HEALTH CARE EDUCATION/TRAINING PROGRAM

## 2024-02-14 PROCEDURE — 82247 BILIRUBIN TOTAL: CPT | Mod: ZL | Performed by: STUDENT IN AN ORGANIZED HEALTH CARE EDUCATION/TRAINING PROGRAM

## 2024-02-14 PROCEDURE — 99396 PREV VISIT EST AGE 40-64: CPT | Performed by: STUDENT IN AN ORGANIZED HEALTH CARE EDUCATION/TRAINING PROGRAM

## 2024-02-14 PROCEDURE — G0463 HOSPITAL OUTPT CLINIC VISIT: HCPCS

## 2024-02-14 PROCEDURE — 36415 COLL VENOUS BLD VENIPUNCTURE: CPT | Mod: ZL | Performed by: STUDENT IN AN ORGANIZED HEALTH CARE EDUCATION/TRAINING PROGRAM

## 2024-02-14 PROCEDURE — 82465 ASSAY BLD/SERUM CHOLESTEROL: CPT | Mod: ZL | Performed by: STUDENT IN AN ORGANIZED HEALTH CARE EDUCATION/TRAINING PROGRAM

## 2024-02-14 RX ORDER — TOPIRAMATE 50 MG/1
50 TABLET, FILM COATED ORAL DAILY
Qty: 90 TABLET | Refills: 3 | Status: SHIPPED | OUTPATIENT
Start: 2024-02-14

## 2024-02-14 RX ORDER — BUTALBITAL, ACETAMINOPHEN AND CAFFEINE 50; 325; 40 MG/1; MG/1; MG/1
1 TABLET ORAL EVERY 4 HOURS PRN
Qty: 30 TABLET | Refills: 5 | Status: SHIPPED | OUTPATIENT
Start: 2024-02-14

## 2024-02-14 RX ORDER — CYCLOBENZAPRINE HCL 5 MG
5 TABLET ORAL 3 TIMES DAILY PRN
Qty: 30 TABLET | Refills: 3 | Status: SHIPPED | OUTPATIENT
Start: 2024-02-14 | End: 2024-07-15

## 2024-02-14 RX ORDER — GABAPENTIN 600 MG/1
600 TABLET ORAL 3 TIMES DAILY
Qty: 240 TABLET | Refills: 4 | Status: SHIPPED | OUTPATIENT
Start: 2024-02-14

## 2024-02-14 RX ORDER — OXYCODONE AND ACETAMINOPHEN 5; 325 MG/1; MG/1
1 TABLET ORAL EVERY 12 HOURS PRN
Qty: 60 TABLET | Refills: 0 | Status: SHIPPED | OUTPATIENT
Start: 2024-02-14

## 2024-02-14 RX ORDER — DULOXETIN HYDROCHLORIDE 60 MG/1
60 CAPSULE, DELAYED RELEASE ORAL DAILY
Qty: 90 CAPSULE | Refills: 3 | Status: SHIPPED | OUTPATIENT
Start: 2024-02-14

## 2024-02-14 RX ORDER — LISINOPRIL AND HYDROCHLOROTHIAZIDE 20; 25 MG/1; MG/1
1 TABLET ORAL DAILY
Qty: 90 TABLET | Refills: 3 | Status: SHIPPED | OUTPATIENT
Start: 2024-02-14

## 2024-02-14 RX ORDER — PREGABALIN 150 MG/1
150 CAPSULE ORAL 2 TIMES DAILY
Qty: 60 CAPSULE | Refills: 5 | Status: SHIPPED | OUTPATIENT
Start: 2024-02-14

## 2024-02-14 SDOH — HEALTH STABILITY: PHYSICAL HEALTH: ON AVERAGE, HOW MANY DAYS PER WEEK DO YOU ENGAGE IN MODERATE TO STRENUOUS EXERCISE (LIKE A BRISK WALK)?: 7 DAYS

## 2024-02-14 SDOH — HEALTH STABILITY: PHYSICAL HEALTH: ON AVERAGE, HOW MANY MINUTES DO YOU ENGAGE IN EXERCISE AT THIS LEVEL?: 150+ MIN

## 2024-02-14 ASSESSMENT — PATIENT HEALTH QUESTIONNAIRE - PHQ9
SUM OF ALL RESPONSES TO PHQ QUESTIONS 1-9: 8
10. IF YOU CHECKED OFF ANY PROBLEMS, HOW DIFFICULT HAVE THESE PROBLEMS MADE IT FOR YOU TO DO YOUR WORK, TAKE CARE OF THINGS AT HOME, OR GET ALONG WITH OTHER PEOPLE: EXTREMELY DIFFICULT
SUM OF ALL RESPONSES TO PHQ QUESTIONS 1-9: 8

## 2024-02-14 ASSESSMENT — ANXIETY QUESTIONNAIRES
8. IF YOU CHECKED OFF ANY PROBLEMS, HOW DIFFICULT HAVE THESE MADE IT FOR YOU TO DO YOUR WORK, TAKE CARE OF THINGS AT HOME, OR GET ALONG WITH OTHER PEOPLE?: SOMEWHAT DIFFICULT
7. FEELING AFRAID AS IF SOMETHING AWFUL MIGHT HAPPEN: NOT AT ALL
4. TROUBLE RELAXING: NOT AT ALL
3. WORRYING TOO MUCH ABOUT DIFFERENT THINGS: NEARLY EVERY DAY
7. FEELING AFRAID AS IF SOMETHING AWFUL MIGHT HAPPEN: NOT AT ALL
1. FEELING NERVOUS, ANXIOUS, OR ON EDGE: NEARLY EVERY DAY
6. BECOMING EASILY ANNOYED OR IRRITABLE: NOT AT ALL
5. BEING SO RESTLESS THAT IT IS HARD TO SIT STILL: NOT AT ALL
2. NOT BEING ABLE TO STOP OR CONTROL WORRYING: NEARLY EVERY DAY
GAD7 TOTAL SCORE: 9
IF YOU CHECKED OFF ANY PROBLEMS ON THIS QUESTIONNAIRE, HOW DIFFICULT HAVE THESE PROBLEMS MADE IT FOR YOU TO DO YOUR WORK, TAKE CARE OF THINGS AT HOME, OR GET ALONG WITH OTHER PEOPLE: SOMEWHAT DIFFICULT
GAD7 TOTAL SCORE: 9
GAD7 TOTAL SCORE: 9

## 2024-02-14 ASSESSMENT — SOCIAL DETERMINANTS OF HEALTH (SDOH): HOW OFTEN DO YOU GET TOGETHER WITH FRIENDS OR RELATIVES?: NEVER

## 2024-02-14 ASSESSMENT — PAIN SCALES - GENERAL: PAINLEVEL: SEVERE PAIN (6)

## 2024-02-14 NOTE — NURSING NOTE
"Medication Reconciliation: Complete    Kami Weiss LPN  2/14/2024 10:33 AM  Chief Complaint   Patient presents with    Physical     Annual exam       Initial BP (!) 154/82 (BP Location: Right arm, Patient Position: Sitting, Cuff Size: Adult Regular)   Pulse 93   Temp 98.8  F (37.1  C) (Tympanic)   Resp 20   Ht 1.727 m (5' 8\")   Wt 74.8 kg (165 lb)   LMP  (LMP Unknown)   SpO2 98%   BMI 25.09 kg/m   Estimated body mass index is 25.09 kg/m  as calculated from the following:    Height as of this encounter: 1.727 m (5' 8\").    Weight as of this encounter: 74.8 kg (165 lb).  Medication Review: complete    The next two questions are to help us understand your food security.  If you are feeling you need any assistance in this area, we have resources available to support you today.          2/14/2024   SDOH- Food Insecurity   Within the past 12 months, did you worry that your food would run out before you got money to buy more? N   Within the past 12 months, did the food you bought just not last and you didn t have money to get more? N         Health Care Directive:  Patient has a Health Care Directive on file      Kami Weiss LPN      "

## 2024-02-14 NOTE — PROGRESS NOTES
Preventive Care Visit  [facility]  Austin Tejeda MD, Family Medicine  Feb 14, 2024    Assessment & Plan     Routine general medical examination at a health care facility  - Comprehensive Metabolic Panel; Future  - CBC and Differential; Future  - Lipid Panel; Future  - MA Screen Bilateral w/[place]; Future  - Comprehensive Metabolic Panel  - CBC and Differential  - Lipid Panel    Intractable chronic migraine without aura and without status migrainosus  Due for refill  - butalbital-acetaminophen-caffeine (ESGIC) -40 MG tablet; Take 1 tablet by mouth every 4 hours as needed for headaches  - topiramate (TOPAMAX) 50 MG tablet; Take 1 tablet (50 mg) by mouth daily    Fibromyalgia  Chronic, stable   - DULoxetine (CYMBALTA) 60 MG capsule; Take 1 capsule (60 mg) by mouth daily  - pregabalin (LYRICA) 150 MG capsule; Take 1 capsule (150 mg) by mouth 2 times daily    Essential hypertension  Elevated here, monitors at home and is usually 120/70s. Refilled   - lisinopril-hydrochlorothiazide (ZESTORETIC) 20-25 MG tablet; Take 1 tablet by mouth daily    Chronic pain syndrome  Lumbar paraspinal muscle spasm    Spinal stenosis of lumbar region with radiculopathy  Osteoarthritis of spine with radiculopathy, lumbar region  Metatarsalgia of left foot  Worsening pain in back, neck, knees, feet, in addition to fibro point tenderness. At this point she has trialed non med options and med options. Trial percocet. Reviewed benefits vs risks and side effects of medication and patient in agreement to start taking.  Follow up in 3 weeks. If stable, then will discuss as a long term med. Goal likely to wean gabapentin or lyrica due to increased risk of drowsiness   - gabapentin (NEURONTIN) 600 MG tablet; Take 1 tablet (600 mg) by mouth 3 times daily  - oxyCODONE-acetaminophen (PERCOCET) 5-325 MG tablet; Take 1 tablet by mouth every 12 hours as needed for pain  - cyclobenzaprine (FLEXERIL) 5 MG tablet; Take 1  "tablet (5 mg) by mouth 3 times daily as needed for muscle spasms                Nicotine/Tobacco Cessation  She reports that she has been smoking cigarettes. She has a 5 pack-year smoking history. She has never used smokeless tobacco.        BMI  Estimated body mass index is 25.09 kg/m  as calculated from the following:    Height as of this encounter: 1.727 m (5' 8\").    Weight as of this encounter: 74.8 kg (165 lb).       Counseling  Appropriate preventive services were discussed with this patient, including applicable screening as appropriate for fall prevention, nutrition, physical activity, Tobacco-use cessation, weight loss and cognition.  Checklist reviewing preventive services available has been given to the patient.  Reviewed patient's diet, addressing concerns and/or questions.   Patient is at risk for social isolation and has been provided with information about the benefit of social connection.   The patient was instructed to see the dentist every 6 months.   The patient's PHQ-9 score is consistent with mild depression. She was provided with information regarding depression.         See Patient Instructions    Return in about 53 weeks (around 2/19/2025) for Annual Wellness Visit.    Slade Quintana is a 58 year old, presenting for the following:  Physical (Annual exam)        Health Care Directive  Patient has a Health Care Directive on file    HPI    Due for physical, med refills, mammo  Pain-- has fibromyalgia at baseline but also has worsening arthritis. Both MCPs are swollen and tender, low back pain worsening. Also has chronic left foot pain   Takes lyrica BID and gabapentin as well as tylenol.                2/14/2024   General Health   How would you rate your overall physical health? (!) DECLINE   Feel stress (tense, anxious, or unable to sleep) Not at all         2/14/2024   Nutrition   Three or more servings of calcium each day? Yes   Diet: Regular (no restrictions)   How many servings of " fruit and vegetables per day? 4 or more   How many sweetened beverages each day? 0-1         2024   Exercise   Days per week of moderate/strenous exercise 7 days   Average minutes spent exercising at this level 150+ min         2024   Social Factors   Frequency of gathering with friends or relatives Never   Worry food won't last until get money to buy more No   Food not last or not have enough money for food? No   Do you have housing?  Yes   Are you worried about losing your housing? No   Lack of transportation? No   Unable to get utilities (heat,electricity)? No   (!) SOCIAL CONNECTIONS CONCERN      2024   Fall Risk   Fallen 2 or more times in the past year? Yes   Trouble with walking or balance? Yes           2024   Dental   Dentist two times every year? (!) NO         2024   TB Screening   Were you born outside of [place]?  No       Today's PHQ-9 Score:       2024     9:56 AM   PHQ-9 SCORE   PHQ-9 Total Score MyChart 8 (Mild depression)   PHQ-9 Total Score 8         2024   Substance Use   Alcohol more than 3/day or more than 7/wk No   Do you use any other substances recreationally? No     Social History     Tobacco Use    Smoking status: Every Day     Packs/day: 0.25     Years: 20.00     Additional pack years: 0.00     Total pack years: 5.00     Types: Cigarettes     Last attempt to quit: 3/18/2018     Years since quittin.9    Smokeless tobacco: Never    Tobacco comments:     clove cigarettes   Vaping Use    Vaping Use: Never used   Substance Use Topics    Alcohol use: Yes     Comment: 1-2 per year    Drug use: No             2024   Breast Cancer Screening   Family history of breast, colon, or ovarian cancer? No / Unknown      Mammogram Screening - Mammogram every 1-2 years updated in Health Maintenance based on mutual decision making          2024   One time HIV Screening   Previous HIV test? Yes         2024   STI Screening   New sexual partner(s) since last  "STI/HIV test? No     History of abnormal Pap smear: Last 3 Pap and HPV Results:       Latest Ref Rng & Units 1/11/2023     9:27 AM 11/20/2020     8:16 AM   PAP / HPV   PAP  Negative for Intraepithelial Lesion or Malignancy (NILM)     PAP (Historical)   UNSAT    HPV 16 DNA Negative Negative  Negative    HPV 18 DNA Negative Negative  Negative    Other HR HPV Negative Negative  Negative            Latest Ref Rng & Units 1/11/2023     9:27 AM 11/20/2020     8:16 AM   PAP / HPV   PAP  Negative for Intraepithelial Lesion or Malignancy (NILM)     PAP (Historical)   UNSAT    HPV 16 DNA Negative Negative  Negative    HPV 18 DNA Negative Negative  Negative    Other HR HPV Negative Negative  Negative      The 10-year ASCVD risk score (Stacie ROSENBERG, et al., 2019) is: 13.8%    Values used to calculate the score:      Age: 58 years      Sex: Female      Is Non- : No      Diabetic: No      Tobacco smoker: Yes      Systolic Blood Pressure: 154 mmHg      Is BP treated: Yes      HDL Cholesterol: 50 mg/dL      Total Cholesterol: 232 mg/dL           Reviewed and updated as needed this visit by Provider                    Lab work is in process      Review of Systems  Constitutional, HEENT, cardiovascular, pulmonary, gi and gu systems are negative, except as otherwise noted.     Objective    Exam  BP (!) 154/82 (BP Location: Right arm, Patient Position: Sitting, Cuff Size: Adult Regular)   Pulse 93   Temp 98.8  F (37.1  C) (Tympanic)   Resp 20   Ht 1.727 m (5' 8\")   Wt 74.8 kg (165 lb)   LMP  (LMP Unknown)   SpO2 98%   BMI 25.09 kg/m     Estimated body mass index is 25.09 kg/m  as calculated from the following:    Height as of this encounter: 1.727 m (5' 8\").    Weight as of this encounter: 74.8 kg (165 lb).    Physical Exam  GENERAL: alert and no distress  EYES: Eyes grossly normal to inspection, PERRL and conjunctivae and sclerae normal  HENT: ear canals and TM's normal, nose and mouth without ulcers or " lesions  RESP: lungs clear to auscultation - no rales, rhonchi or wheezes  CV: regular rate and rhythm, normal S1 S2, no S3 or S4, no murmur, click or rub, no peripheral edema   NEURO: mentation intact and slow antalgic gait  Comprehensive back pain exam:  Tenderness of spinous process and paraspinal muscles in lumbar region, Pain limits the following motions: standing from sitting, sitting from standing, walking, and Lower extremity sensation normal and equal on both sides  PSYCH: mentation appears normal, affect normal/bright      Signed Electronically by: Austin Tejeda MD  Answers submitted by the patient for this visit:  Patient Health Questionnaire (Submitted on 2/14/2024)  If you checked off any problems, how difficult have these problems made it for you to do your work, take care of things at home, or get along with other people?: Extremely difficult  PHQ9 TOTAL SCORE: 8  ROSANA-7 (Submitted on 2/14/2024)  ROSANA 7 TOTAL SCORE: 9

## 2024-02-14 NOTE — RESULT ENCOUNTER NOTE
Team - please call patient with results.  Calcium, protein, and creatinine are all a little elevated compared to previous. This might be from dehydration so please increase fluid intake. LDL (bad cholesterol) elevated but your HDL (good cholesterol) is quite elevated which is good.

## 2024-02-14 NOTE — PATIENT INSTRUCTIONS
"Eating Healthy Foods: Care Instructions  With every meal, you can make healthy food choices. Try to eat a variety of fruits, vegetables, whole grains, lean proteins, and low-fat dairy products. This can help you get the right balance of nutrients, including vitamins and minerals. Small changes add up over time. You can start by adding one healthy food to your meals each day.    Try to make half your plate fruits and vegetables, one-fourth whole grains, and one-fourth lean proteins. Try including dairy with your meals.   Eat more fruits and vegetables. Try to have them with most meals and snacks.   Foods for healthy eating    Fruits    These can be fresh, frozen, canned, or dried.  Try to choose whole fruit rather than fruit juice.  Eat a variety of colors.    Vegetables    These can be fresh, frozen, canned, or dried.  Beans, peas, and lentils count too.    Whole grains    Choose whole-grain breads, cereals, and noodles.  Try brown rice.    Lean proteins    These can include lean meat, poultry, fish, and eggs.  You can also have tofu, beans, peas, lentils, nuts, and seeds.    Dairy    Try milk, yogurt, and cheese.  Choose low-fat or fat-free when you can.  If you need to, use lactose-free milk or fortified plant-based milk products, such as soy milk.    Water    Drink water when you're thirsty.  Limit sugar-sweetened drinks, including soda, fruit drinks, and sports drinks.  Where can you learn more?  Go to https://www.Takkle.net/patiented  Enter T756 in the search box to learn more about \"Eating Healthy Foods: Care Instructions.\"  Current as of: February 28, 2023               Content Version: 13.8    4639-4513 [employer].   Care instructions adapted under license by your healthcare professional. If you have questions about a medical condition or this instruction, always ask your healthcare professional. [employer] disclaims any warranty or liability for your use of this " information.      Preventing Falls: Care Instructions  Injuries and health problems such as trouble walking or poor eyesight can increase your risk of falling. So can some medicines. But there are things you can do to help prevent falls. You can exercise to get stronger. You can also arrange your home to make it safer.    Talk to your doctor about the medicines you take. Ask if any of them increase the risk of falls and whether they can be changed or stopped.   Try to exercise regularly. It can help improve your strength and balance. This can help lower your risk of falling.     Practice fall safety and prevention.    Wear low-heeled shoes that fit well and give your feet good support. Talk to your doctor if you have foot problems that make this hard.  Carry a cellphone or wear a medical alert device that you can use to call for help.  Use stepladders instead of chairs to reach high objects. Don't climb if you're at risk for falls. Ask for help, if needed.  Wear the correct eyeglasses, if you need them.    Make your home safer.    Remove rugs, cords, clutter, and furniture from walkways.  Keep your house well lit. Use night-lights in hallways and bathrooms.  Install and use sturdy handrails on stairways.  Wear nonskid footwear, even inside. Don't walk barefoot or in socks without shoes.    Be safe outside.    Use handrails, curb cuts, and ramps whenever possible.  Keep your hands free by using a shoulder bag or backpack.  Try to walk in well-lit areas. Watch out for uneven ground, changes in pavement, and debris.  Be careful in the winter. Walk on the grass or gravel when sidewalks are slippery. Use de-icer on steps and walkways. Add non-slip devices to shoes.    Put grab bars and nonskid mats in your shower or tub and near the toilet. Try to use a shower chair or bath bench when bathing.   Get into a tub or shower by putting in your weaker leg first. Get out with your strong side first. Have a phone or medical  "alert device in the bathroom with you.   Where can you learn more?  Go to https://www.[employer].net/patiented  Enter G117 in the search box to learn more about \"Preventing Falls: Care Instructions.\"  Current as of: July 18, 2023               Content Version: 13.8 2006-2023 [employer].   Care instructions adapted under license by your healthcare professional. If you have questions about a medical condition or this instruction, always ask your healthcare professional. Healthwise, [employer] disclaims any warranty or liability for your use of this information.      Relationships for Good Health  Relationships are important for our health and happiness. Social isolation, loneliness and lack of support are bad for your health. Studies show that loneliness can harm health and limit your life span as much as high blood pressure and smoking.   Take some time to reflect on your relationships. Then answer these questions:  Are there people in your life that cause you stress or drain your energy? What can you do to set limits?  ________________________________________________________________________________________________________________________________________________________________________________________________________________________________________________________________________________________________________________________________________________  Who do you enjoy spending time with? Who can you go to for support?  ________________________________________________________________________________________________________________________________________________________________________________________________________________________________________________________________________________________________________________________________________________  What can you do to improve your relationships with " others?  __________________________________________________________________________________________________________________________________________________________________________________________________________________  ______________________________________________________________________________________________________________________________  What do you like most about your relationships with others?  ________________________________________________________________________________________________________________________________________________________________________________________________________________________________________________________________________________________________________________________________________________  My goal: ______________________________________________________________________  I will ______________________________________________________________________________________________________________________________________________________________________________________________    For informational purposes only. Not to replace the advice of your health care provider. Copyright   2018 [facility]. All rights reserved. Clinically reviewed by [facility]  Team. [employer] 031248 - Rev 04/21.     A Good Support System Is Important (02:04)  Your health professional recommends that you watch this short online health video.  Learn how to connect with family, friends, and others for support with health issues.  Purpose:  Teaches how to reach out to family, friends, and groups for support when managing a health problem.  Goal:  User will learn how a good support system can improve confidence to manage health and live well.     How to watch the video    Scan the QR code   OR Visit the website    https://link.Qranio.net/r/Bxsxxxvc4rgxj   Current as of: June 25, 2023               Content Version: 13.8    3745-5041 [employer].   Care instructions  adapted under license by your healthcare professional. If you have questions about a medical condition or this instruction, always ask your healthcare professional. Healthwise, [facility] disclaims any warranty or liability for your use of this information.      Learning About Depression Screening  What is depression screening?  Depression screening is a way to see if you have depression symptoms. It may be done by a doctor or counselor. It's often part of a routine checkup. That's because your mental health is just as important as your physical health.  Depression is a mental health condition that affects how you feel, think, and act. You may:  Have less energy.  Lose interest in your daily activities.  Feel sad and grouchy for a long time.  Depression is very common. It affects people of all ages.  Many things can lead to depression. Some people become depressed after they have a stroke or find out they have a major illness like cancer or heart disease. The death of a loved one or a breakup may lead to depression. It can run in families. Most experts believe that a combination of inherited genes and stressful life events can cause it.  What happens during screening?  You may be asked to fill out a form about your depression symptoms. You and the doctor will discuss your answers. The doctor may ask you more questions to learn more about how you think, act, and feel.  What happens after screening?  If you have symptoms of depression, your doctor will talk to you about your options.  Doctors usually treat depression with medicines or counseling. Often, combining the two works best. Many people don't get help because they think that they'll get over the depression on their own. But people with depression may not get better unless they get treatment.  The cause of depression is not well understood. There may be many factors involved. But if you have depression, it's not your fault.  A serious symptom of depression is  "thinking about death or suicide. If you or someone you care about talks about this or about feeling hopeless, get help right away.  It's important to know that depression can be treated. Medicine, counseling, and self-care may help.  Where can you learn more?  Go to https://www.[employer].net/patiented  Enter T185 in the search box to learn more about \"Learning About Depression Screening.\"  Current as of: June 25, 2023               Content Version: 13.8    0970-8694 [employer].   Care instructions adapted under license by your healthcare professional. If you have questions about a medical condition or this instruction, always ask your healthcare professional. [employer] disclaims any warranty or liability for your use of this information.      Preventive Care Advice   This is general advice given by our system to help you stay healthy. However, your care team may have specific advice just for you. Please talk to your care team about your preventive care needs.  Nutrition  Eat 5 or more servings of fruits and vegetables each day.  Try wheat bread, brown rice and whole grain pasta (instead of white bread, rice, and pasta).  Get enough calcium and vitamin D. Check the label on foods and aim for 100% of the RDA (recommended daily allowance).  Lifestyle  Exercise at least 150 minutes each week  (30 minutes a day, 5 days a week).  Do muscle strengthening activities 2 days a week. These help control your weight and prevent disease.  No smoking.  Wear sunscreen to prevent skin cancer.  Have a dental exam and cleaning every 6 months.  Yearly exams  See your health care team every year to talk about:  Any changes in your health.  Any medicines your care team has prescribed.  Preventive care, family planning, and ways to prevent chronic diseases.  Shots (vaccines)   HPV shots (up to age 26), if you've never had them before.  Hepatitis B shots (up to age 59), if you've never had them " before.  COVID-19 shot: Get this shot when it's due.  Flu shot: Get a flu shot every year.  Tetanus shot: Get a tetanus shot every 10 years.  Pneumococcal, hepatitis A, and RSV shots: Ask your care team if you need these based on your risk.  Shingles shot (for age 50 and up)  General health tests  Diabetes screening:  Starting at age 35, Get screened for diabetes at least every 3 years.  If you are younger than age 35, ask your care team if you should be screened for diabetes.  Cholesterol test: At age 39, start having a cholesterol test every 5 years, or more often if advised.  Bone density scan (DEXA): At age 50, ask your care team if you should have this scan for osteoporosis (brittle bones).  Hepatitis C: Get tested at least once in your life.  STIs (sexually transmitted infections)  Before age 24: Ask your care team if you should be screened for STIs.  After age 24: Get screened for STIs if you're at risk. You are at risk for STIs (including HIV) if:  You are sexually active with more than one person.  You don't use condoms every time.  You or a partner was diagnosed with a sexually transmitted infection.  If you are at risk for HIV, ask about PrEP medicine to prevent HIV.  Get tested for HIV at least once in your life, whether you are at risk for HIV or not.  Cancer screening tests  Cervical cancer screening: If you have a cervix, begin getting regular cervical cancer screening tests starting at age 21.  Breast cancer scan (mammogram): If you've ever had breasts, begin having regular mammograms starting at age 40. This is a scan to check for breast cancer.  Colon cancer screening: It is important to start screening for colon cancer at age 45.  Have a colonoscopy test every 10 years (or more often if you're at risk) Or, ask your provider about stool tests like a FIT test every year or Cologuard test every 3 years.  To learn more about your testing options, visit:   https://www.[employer]/471069.pdf.  For help  making a decision, visit:   https://bit.ly/zd46048.  Prostate cancer screening test: If you have a prostate, ask your care team if a prostate cancer screening test (PSA) at age 55 is right for you.  Lung cancer screening: If you are a current or former smoker ages 50 to 80, ask your care team if ongoing lung cancer screenings are right for you.  For informational purposes only. Not to replace the advice of your health care provider. Copyright   2023 [facility]. All rights reserved. Clinically reviewed by the [facility] Transitions Program. [employer] 944739 - REV 01/24.

## 2024-07-10 DIAGNOSIS — M62.830 LUMBAR PARASPINAL MUSCLE SPASM: ICD-10-CM

## 2024-07-12 NOTE — TELEPHONE ENCOUNTER
[facility] sent Rx request for the following:      Requested Prescriptions   Pending Prescriptions Disp Refills    cyclobenzaprine (FLEXERIL) 5 MG tablet [Pharmacy Med Name: cyclobenzaprine 5 mg tablet] 30 tablet 3     Sig: Take 1 tablet (5 mg) by mouth 3 times daily as needed for muscle spasms       There is no refill protocol information for this order          Last Prescription Date:   2/14/24   Last Fill Qty/Refills:         30, R-3    Last Office Visit:              3/19/24   Future Office visit:          None scheduled    Ailin Mcintyre RN on 7/12/2024 at 1:30 PM

## 2024-07-15 RX ORDER — CYCLOBENZAPRINE HCL 5 MG
5 TABLET ORAL 3 TIMES DAILY PRN
Qty: 30 TABLET | Refills: 3 | Status: SHIPPED | OUTPATIENT
Start: 2024-07-15

## 2024-08-07 ENCOUNTER — TELEPHONE (OUTPATIENT)
Dept: FAMILY MEDICINE | Facility: OTHER | Age: 59
End: 2024-08-07
Payer: COMMERCIAL

## 2024-08-07 NOTE — LETTER
August 8, 2024      Lilian Craft  [address] [address] [address]        To Whom It May Concern:    Lilian Craft is seen in our clinic. She has       Sincerely,        Austin Tejeda MD

## 2024-08-07 NOTE — TELEPHONE ENCOUNTER
Left message on patient's home phone to return call to clinic.  No answer on cell and no voicemail available.    Kami Weiss LPN............8/7/2024 11:06 AM

## 2024-08-07 NOTE — TELEPHONE ENCOUNTER
I don't write letters for emotional support animals. I can write a letter stating just mental health diagnosis as well as post menopausal status but other than that I cannot do much more

## 2024-08-07 NOTE — LETTER
August 8, 2024      Lilian Craft  [address] [address] [address]        To Whom It May Concern:    Lilian Craft is seen in our clinic. Has temperature intolerance due to menopausal symptoms.       Sincerely,        Austin Tejeda MD

## 2024-08-07 NOTE — TELEPHONE ENCOUNTER
Patient moved into a new location and she has an emotional support cat. She needs a note for the owners. She is also going through menopause and they keep the air at 72 degrees. She is wanting a letter stating that she needs it lower. Please advise.     The office number is Lesvia Mattson [phone]    Cheyenne Juarez on 8/7/2024 at 10:17 AM

## 2024-08-08 ENCOUNTER — TELEPHONE (OUTPATIENT)
Dept: OBGYN | Facility: OTHER | Age: 59
End: 2024-08-08
Payer: COMMERCIAL

## 2024-08-08 NOTE — TELEPHONE ENCOUNTER
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 Pt was told that we could put in a referral if she wanted but we would not write her a letter for her pet. Pt was given information regarding what we saw her for 2 years ago does not support her needing a letter for a support animal. Pt states \"I know, my father was a , I guess I'll just pay the $300 to keep my cat\". Nurse asked if there was anything else we could help her with, pt declines.     Peyton Garcia RN on 8/8/2024 at 2:43 PM    "

## 2024-08-08 NOTE — TELEPHONE ENCOUNTER
Left message for patient to return call to clinic for response below per provider.  Printed letter and placed in outgoing mail.    Kami Weiss LPN............8/8/2024 3:45 PM

## 2024-08-08 NOTE — TELEPHONE ENCOUNTER
Patient notified of response per provider and verbally understood.  She would like the two letters offered stating her mental health would benefit from a support animal.  Also, her menopause status making her personal temperature uncomfortable.      Kami Weiss LPN............8/8/2024 1:57 PM

## 2024-08-08 NOTE — TELEPHONE ENCOUNTER
Patient requests a letter for a support animal. Patient stated this is the first time she has lived on her own and the cat has given her great comfort. Patient stated she would need this letter by September 1st 2024.    Please call patient to notify her of the decision.    Patient has recently moved and does not have a primary in her area and her old PCP was unable to oblige.     : Mary Gutiérrez [phone]            Emi Falcon on 8/8/2024 at 2:20 PM

## 2024-08-08 NOTE — TELEPHONE ENCOUNTER
There are no mental health diagnosis I can find in the chart so I am unable to provide any letter that can state any diagnosis for mental health. She can find a therapist who writes these letters but I will not be.   I will have letter in mychart stating she has menopausal symptoms

## 2024-08-09 ENCOUNTER — TELEPHONE (OUTPATIENT)
Dept: FAMILY MEDICINE | Facility: OTHER | Age: 59
End: 2024-08-09
Payer: COMMERCIAL

## 2024-08-09 NOTE — TELEPHONE ENCOUNTER
Notified patient of response and recommendation per provider.  She verbally understood and will call back with any other questions or concerns.    Kami Weiss LPN............8/9/2024 10:03 AM

## 2024-08-09 NOTE — TELEPHONE ENCOUNTER
Notified patient letter was mailed yesterday and she should receive it soon.    Kami Weiss LPN............8/9/2024 2:42 PM

## 2024-08-09 NOTE — TELEPHONE ENCOUNTER
Patient  calling with email [email or web address] to send that letter to.     Cheyenne Juarez on 8/9/2024 at 1:49 PM

## 2024-08-21 DIAGNOSIS — M79.7 FIBROMYALGIA: ICD-10-CM

## 2024-08-26 RX ORDER — PREGABALIN 150 MG/1
150 CAPSULE ORAL 2 TIMES DAILY
Qty: 60 CAPSULE | Refills: 5 | OUTPATIENT
Start: 2024-08-26

## 2024-08-26 NOTE — TELEPHONE ENCOUNTER
Refused, with note to pharmacy- needs appointment. Kami Varela RN .............. 8/26/2024  3:05 PM

## 2024-08-26 NOTE — TELEPHONE ENCOUNTER
[facility] sent Rx request for the following:      Requested Prescriptions   Pending Prescriptions Disp Refills    pregabalin (LYRICA) 150 MG capsule [Pharmacy Med Name: pregabalin 150 mg capsule] 60 capsule 5     Sig: Take 1 capsule (150 mg) by mouth 2 times daily       There is no refill protocol information for this order     Last Prescription Date:   2/14/24  Last Fill Qty/Refills:         60, R-5    Last Office Visit:              2/14/24 (Physical)   Future Office visit:           None    Associated Diagnoses  Fibromyalgia [M79.7]        Per LOV note:  Return in about 53 weeks (around 2/19/2025) for Annual Wellness Visit.     Routing to covering provider for refill consideration, as PCP/provider is out of clinic >48 hours or Pt is completely out of medication and provider is out of the clinic today.    Unable to complete prescription refill per RN Medication Refill Policy. Kami Varela RN .............. 8/26/2024  2:45 PM

## 2024-08-31 ENCOUNTER — APPOINTMENT (OUTPATIENT)
Dept: CT IMAGING | Facility: CLINIC | Age: 59
End: 2024-08-31
Attending: PHYSICIAN ASSISTANT
Payer: COMMERCIAL

## 2024-08-31 ENCOUNTER — HOSPITAL ENCOUNTER (EMERGENCY)
Facility: CLINIC | Age: 59
Discharge: HOME | End: 2024-08-31
Attending: PHYSICIAN ASSISTANT | Admitting: PHYSICIAN ASSISTANT
Payer: COMMERCIAL

## 2024-08-31 VITALS
RESPIRATION RATE: 16 BRPM | DIASTOLIC BLOOD PRESSURE: 95 MMHG | HEART RATE: 76 BPM | HEIGHT: 69 IN | OXYGEN SATURATION: 97 % | TEMPERATURE: 98 F | WEIGHT: 165 LBS | BODY MASS INDEX: 24.44 KG/M2 | SYSTOLIC BLOOD PRESSURE: 143 MMHG

## 2024-08-31 DIAGNOSIS — K13.79 MOUTH PAIN: ICD-10-CM

## 2024-08-31 DIAGNOSIS — K04.7 DENTAL INFECTION: ICD-10-CM

## 2024-08-31 LAB
ALBUMIN SERPL BCG-MCNC: 4.5 G/DL (ref 3.5–5.2)
ALP SERPL-CCNC: 140 U/L (ref 40–150)
ALT SERPL W P-5'-P-CCNC: 29 U/L (ref 0–50)
ANION GAP SERPL CALCULATED.3IONS-SCNC: 17 MMOL/L (ref 7–15)
AST SERPL W P-5'-P-CCNC: 34 U/L (ref 0–45)
BASOPHILS # BLD AUTO: 0.1 10E3/UL (ref 0–0.2)
BASOPHILS NFR BLD AUTO: 1 %
BILIRUB SERPL-MCNC: 0.4 MG/DL
BUN SERPL-MCNC: 12.9 MG/DL (ref 8–23)
CALCIUM SERPL-MCNC: 9.4 MG/DL (ref 8.8–10.4)
CHLORIDE SERPL-SCNC: 100 MMOL/L (ref 98–107)
CREAT SERPL-MCNC: 0.89 MG/DL (ref 0.51–0.95)
EGFRCR SERPLBLD CKD-EPI 2021: 74 ML/MIN/1.73M2
EOSINOPHIL # BLD AUTO: 0 10E3/UL (ref 0–0.7)
EOSINOPHIL NFR BLD AUTO: 0 %
ERYTHROCYTE [DISTWIDTH] IN BLOOD BY AUTOMATED COUNT: 13.9 % (ref 10–15)
GLUCOSE SERPL-MCNC: 70 MG/DL (ref 70–99)
HCO3 SERPL-SCNC: 23 MMOL/L (ref 22–29)
HCT VFR BLD AUTO: 38.3 % (ref 35–47)
HGB BLD-MCNC: 13.1 G/DL (ref 11.7–15.7)
IMM GRANULOCYTES # BLD: 0 10E3/UL
IMM GRANULOCYTES NFR BLD: 0 %
LYMPHOCYTES # BLD AUTO: 2.7 10E3/UL (ref 0.8–5.3)
LYMPHOCYTES NFR BLD AUTO: 34 %
MCH RBC QN AUTO: 33 PG (ref 26.5–33)
MCHC RBC AUTO-ENTMCNC: 34.2 G/DL (ref 31.5–36.5)
MCV RBC AUTO: 97 FL (ref 78–100)
MONOCYTES # BLD AUTO: 0.4 10E3/UL (ref 0–1.3)
MONOCYTES NFR BLD AUTO: 6 %
NEUTROPHILS # BLD AUTO: 4.8 10E3/UL (ref 1.6–8.3)
NEUTROPHILS NFR BLD AUTO: 60 %
NRBC # BLD AUTO: 0 10E3/UL
NRBC BLD AUTO-RTO: 0 /100
PLATELET # BLD AUTO: 299 10E3/UL (ref 150–450)
POTASSIUM SERPL-SCNC: 3.5 MMOL/L (ref 3.4–5.3)
PROT SERPL-MCNC: 7.6 G/DL (ref 6.4–8.3)
RBC # BLD AUTO: 3.97 10E6/UL (ref 3.8–5.2)
SODIUM SERPL-SCNC: 140 MMOL/L (ref 135–145)
WBC # BLD AUTO: 8 10E3/UL (ref 4–11)

## 2024-08-31 PROCEDURE — 80053 COMPREHEN METABOLIC PANEL: CPT | Performed by: PHYSICIAN ASSISTANT

## 2024-08-31 PROCEDURE — 96361 HYDRATE IV INFUSION ADD-ON: CPT | Performed by: PHYSICIAN ASSISTANT

## 2024-08-31 PROCEDURE — 250N000009 HC RX 250: Performed by: PHYSICIAN ASSISTANT

## 2024-08-31 PROCEDURE — 85025 COMPLETE CBC W/AUTO DIFF WBC: CPT | Performed by: PHYSICIAN ASSISTANT

## 2024-08-31 PROCEDURE — 258N000003 HC RX IP 258 OP 636: Performed by: PHYSICIAN ASSISTANT

## 2024-08-31 PROCEDURE — 36415 COLL VENOUS BLD VENIPUNCTURE: CPT | Performed by: PHYSICIAN ASSISTANT

## 2024-08-31 PROCEDURE — 250N000011 HC RX IP 250 OP 636: Performed by: PHYSICIAN ASSISTANT

## 2024-08-31 PROCEDURE — 70491 CT SOFT TISSUE NECK W/DYE: CPT

## 2024-08-31 PROCEDURE — 250N000013 HC RX MED GY IP 250 OP 250 PS 637: Performed by: PHYSICIAN ASSISTANT

## 2024-08-31 PROCEDURE — 96365 THER/PROPH/DIAG IV INF INIT: CPT | Mod: 59 | Performed by: PHYSICIAN ASSISTANT

## 2024-08-31 PROCEDURE — 99285 EMERGENCY DEPT VISIT HI MDM: CPT | Mod: 25 | Performed by: PHYSICIAN ASSISTANT

## 2024-08-31 RX ORDER — CEFAZOLIN SODIUM 1 G/3ML
1 INJECTION, POWDER, FOR SOLUTION INTRAMUSCULAR; INTRAVENOUS ONCE
Status: COMPLETED | OUTPATIENT
Start: 2024-08-31 | End: 2024-08-31

## 2024-08-31 RX ORDER — BUPIVACAINE HYDROCHLORIDE AND EPINEPHRINE 5; 5 MG/ML; UG/ML
1.8 INJECTION, SOLUTION PERINEURAL ONCE
Status: COMPLETED | OUTPATIENT
Start: 2024-08-31 | End: 2024-08-31

## 2024-08-31 RX ORDER — ACETAMINOPHEN 325 MG/1
975 TABLET ORAL ONCE
Status: COMPLETED | OUTPATIENT
Start: 2024-08-31 | End: 2024-08-31

## 2024-08-31 RX ORDER — IOPAMIDOL 755 MG/ML
90 INJECTION, SOLUTION INTRAVASCULAR ONCE
Status: COMPLETED | OUTPATIENT
Start: 2024-08-31 | End: 2024-08-31

## 2024-08-31 RX ADMIN — CEFAZOLIN 1 G: 1 INJECTION, POWDER, FOR SOLUTION INTRAMUSCULAR; INTRAVENOUS at 19:09

## 2024-08-31 RX ADMIN — IOPAMIDOL 90 ML: 755 INJECTION, SOLUTION INTRAVENOUS at 18:11

## 2024-08-31 RX ADMIN — BUPIVACAINE HYDROCHLORIDE AND EPINEPHRINE BITARTRATE 1.8 ML: 5; .005 INJECTION, SOLUTION SUBCUTANEOUS at 17:30

## 2024-08-31 RX ADMIN — SODIUM CHLORIDE 60 ML: 9 INJECTION, SOLUTION INTRAVENOUS at 18:17

## 2024-08-31 RX ADMIN — SODIUM CHLORIDE 1000 ML: 9 INJECTION, SOLUTION INTRAVENOUS at 17:00

## 2024-08-31 RX ADMIN — ACETAMINOPHEN 975 MG: 325 TABLET ORAL at 17:15

## 2024-08-31 ASSESSMENT — ACTIVITIES OF DAILY LIVING (ADL)
ADLS_ACUITY_SCORE: 35

## 2024-08-31 ASSESSMENT — COLUMBIA-SUICIDE SEVERITY RATING SCALE - C-SSRS
2. HAVE YOU ACTUALLY HAD ANY THOUGHTS OF KILLING YOURSELF IN THE PAST MONTH?: NO
1. IN THE PAST MONTH, HAVE YOU WISHED YOU WERE DEAD OR WISHED YOU COULD GO TO SLEEP AND NOT WAKE UP?: NO
6. HAVE YOU EVER DONE ANYTHING, STARTED TO DO ANYTHING, OR PREPARED TO DO ANYTHING TO END YOUR LIFE?: NO

## 2024-08-31 NOTE — ED PROVIDER NOTES
"  Emergency Department Note      History of Present Illness     Chief Complaint   Dental Pain      HPI   Lilian Craft is a 59 year old female who presents to the ED for evaluation of facial pain and swelling.  Patient notes onset of left lower mouth pain that began approximately 1 week ago.  She is notes that this has subsequently developed into left-sided facial swelling and pain.  She states she has been pushing at the region around a tooth that hurts and expressing out what appears to be skin like material per patient.  No fevers or chills, however she does endorse feeling unwell overall.  She notes having difficulty swallowing and difficulty breathing secondary to her pain.  She does state that she has poor dentition.  She states that she has taken ibuprofen for her symptoms as well as a \"few\" drinks of wilman. She has not seen a dentist for this.     Independent Historian   None    Review of External Notes   ED visit on 4/13/24 for alcohol intoxication, no transaminitis at that time.    Past Medical History     Medical History and Problem List   Past Medical History:   Diagnosis Date    Borderline hypertension     H/O bee sting allergy     Osteoarthritis     PUD (peptic ulcer disease)     Seasonal allergies        Medications   amoxicillin-clavulanate (AUGMENTIN) 875-125 MG tablet  butalbital-acetaminophen-caffeine (ESGIC) -40 MG tablet  cyclobenzaprine (FLEXERIL) 5 MG tablet  DULoxetine (CYMBALTA) 60 MG capsule  EPINEPHrine (EPIPEN/ADRENACLICK/OR ANY BX GENERIC EQUIV) 0.3 MG/0.3ML injection 2-pack  gabapentin (NEURONTIN) 600 MG tablet  lisinopril-hydrochlorothiazide (ZESTORETIC) 20-25 MG tablet  oxyCODONE-acetaminophen (PERCOCET) 5-325 MG tablet  pregabalin (LYRICA) 150 MG capsule  PROGESTERONE 100MG/G CREAM  topiramate (TOPAMAX) 50 MG tablet        Surgical History   Past Surgical History:   Procedure Laterality Date    COLONOSCOPY N/A 12/15/2020    1 hyperplastic, 1 tubular adenoma, follow up 5 years, " "12/15/2025    RECONSTRUCT FOREFOOT WITH METATARSOPHALANGEAL (MTP) FUSION Left 12/18/2020    Procedure: RECONSTRUCTION, FOREFOOT, WITH MTP JOINT FUSION, 2nd metatarsal head resection;  Surgeon: Conrad Parish DPM;  Location:  OR       Physical Exam     Patient Vitals for the past 24 hrs:   BP Temp Temp src Pulse Resp SpO2 Height Weight   08/31/24 1843 (!) 143/95 -- -- 76 -- 97 % -- --   08/31/24 1546 (!) 175/101 98  F (36.7  C) Tympanic 77 16 100 % 1.753 m (5' 9\") 74.8 kg (165 lb)     Physical Exam  General: Patient is alert, awake and interactive when I enter the room  Head: The scalp, face, and head appear normal  Eyes: Conjunctivae are normal  ENT: The nose is normal, Pinnae are normal, External acoustic canals are normal. No oropharyngeal erythema or exudate.   Dental inspection shows dental caries, no periapical abscess. There is swelling noted around gumline of left lower molar, no purulence noted. Floor of mouth is soft. Uvula is midline.   Neck: Trachea midline  CV: Pulses are normal.   Resp: No respiratory distress   Musc: Normal muscular tone, moving all extremities.  Skin: No rash or lesions noted. Mild left sided lower facial swelling, no erythema or warmth.  Neuro:  Speech is normal and fluent. Face is symmetric.   Psych: Normal affect.  Appropriate interactions.  Nursing notes and vital signs reviewed.      Diagnostics     Lab Results   Labs Ordered and Resulted from Time of ED Arrival to Time of ED Departure   COMPREHENSIVE METABOLIC PANEL - Abnormal       Result Value    Sodium 140      Potassium 3.5      Carbon Dioxide (CO2) 23      Anion Gap 17 (*)     Urea Nitrogen 12.9      Creatinine 0.89      GFR Estimate 74      Calcium 9.4      Chloride 100      Glucose 70      Alkaline Phosphatase 140      AST 34      ALT 29      Protein Total 7.6      Albumin 4.5      Bilirubin Total 0.4     CBC WITH PLATELETS AND DIFFERENTIAL    WBC Count 8.0      RBC Count 3.97      Hemoglobin 13.1      Hematocrit 38.3  "     MCV 97      MCH 33.0      MCHC 34.2      RDW 13.9      Platelet Count 299      % Neutrophils 60      % Lymphocytes 34      % Monocytes 6      % Eosinophils 0      % Basophils 1      % Immature Granulocytes 0      NRBCs per 100 WBC 0      Absolute Neutrophils 4.8      Absolute Lymphocytes 2.7      Absolute Monocytes 0.4      Absolute Eosinophils 0.0      Absolute Basophils 0.1      Absolute Immature Granulocytes 0.0      Absolute NRBCs 0.0         Imaging   Soft tissue neck CT w contrast   Final Result   IMPRESSION:    1.  Infiltration of the subcutaneous fat and swelling overlying the buccal cortex of the left mandibular body consistent with cellulitis. No well-defined abscess or collection.    2.  Dental caries involving the left first mandibular molar.   3.  No evidence of cervical lymphadenopathy by size or morphologic criteria.        Independent Interpretation   None    ED Course      Medications Administered   Medications   ceFAZolin (ANCEF) 1 g vial to attach to  ml bag for ADULT or 50 ml bag for PEDS (1 g Intravenous $New Bag 8/31/24 1909)   sodium chloride 0.9% BOLUS 1,000 mL (0 mLs Intravenous Stopped 8/31/24 1822)   acetaminophen (TYLENOL) tablet 975 mg (975 mg Oral $Given 8/31/24 1715)   BUPivacaine 0.5 % EPINEPHrine 1:200,000 dental injection SOLN 1.8 mL (1.8 mLs Other $Given by Other Clinician 8/31/24 1730)   iopamidol (ISOVUE-370) solution 90 mL (90 mLs Intravenous $Given 8/31/24 1811)   sodium chloride 0.9 % CT scan flush use (60 mLs Intravenous $Given 8/31/24 1817)       Procedures   Procedures     Discussion of Management   None    ED Course        Additional Documentation  None    Medical Decision Making / Diagnosis     CMS Diagnoses: None    MIPS       None    MDM   Lilian Craft is a 59 year old female who presents to the ED for evaluation of facial swelling and pain that patient is concerned is dental in etiology. See HPI as above for additional details. Vitals and physical exam as  above. DDx was broad and included periapical abscess, facial cellulitis, pulpitis, PTA, RPA, other nefarious etiologies such as Lemierre's syndrome, Nino's angina. Work up obtained as above suggestive for infiltration of the subcutaneous fat and swelling overlying the buccal cortex of the left mandibular body consistent with cellulitis. No evidence for alternative nefarious etiology at this time. Will start patient on abx as below. Provided dose of IV abx here as above. Do feel patient is safe for discharge to home. Discussed reasons to return. All questions answered. Patient discharged to home in stable condition.    Disposition   The patient was discharged.     Diagnosis     ICD-10-CM    1. Mouth pain  K13.79       2. Dental infection  K04.7            Discharge Medications   New Prescriptions    AMOXICILLIN-CLAVULANATE (AUGMENTIN) 875-125 MG TABLET    Take 1 tablet by mouth 2 times daily for 10 days.       This record was created at least in part using electronic voice recognition software, so please excuse any typographical errors.         Conrad Peralta PA-C  08/31/24 1937

## 2024-08-31 NOTE — DISCHARGE INSTRUCTIONS
Take full course of antibiotics as prescribed.  Use Tylenol and ibuprofen for pain.  Stay hydrated.  Follow up with dentistry ASAP for definitive management.

## 2024-08-31 NOTE — ED TRIAGE NOTES
Patient has had dental abscess for a few days. Patient didn't have a ride to the hospital so they called 911. Patient just moved here and doesn't have a dentist.     Triage Assessment (Adult)       Row Name 08/31/24 1542          Triage Assessment    Airway WDL WDL        Respiratory WDL    Respiratory WDL WDL        Skin Circulation/Temperature WDL    Skin Circulation/Temperature WDL WDL        Cardiac WDL    Cardiac WDL WDL        Peripheral/Neurovascular WDL    Peripheral Neurovascular WDL WDL        Cognitive/Neuro/Behavioral WDL    Cognitive/Neuro/Behavioral WDL WDL

## (undated) DEVICE — DRSG KERLIX 4 1/2"X4YDS ROLL 6715

## (undated) DEVICE — SOL WATER 1500ML

## (undated) DEVICE — ENDO BRUSH CHANNEL MASTER CLEANING 2-4.2MM BW-412T

## (undated) DEVICE — SU VICRYL 2-0 SH 27" UND J417H

## (undated) DEVICE — GLOVE BIOGEL INDICATOR 7.5 LF 41675

## (undated) DEVICE — DRAPE C-ARM PACK 9"

## (undated) DEVICE — TUBING SUCTION 10'X3/16" N510

## (undated) DEVICE — DRSG ABDOMINAL PAD UNSTERILE 5X9" 9190

## (undated) DEVICE — DRAPE STERI TOWEL LG 1010

## (undated) DEVICE — DRSG GAUZE 4X4" TRAY 6939

## (undated) DEVICE — SUCTION MANIFOLD NEPTUNE 2 SYS 4 PORT 0702-020-000

## (undated) DEVICE — ESU GROUND PAD ADULT W/CORD E7507

## (undated) DEVICE — DRILL BIT ARTHREX 3.0MM AR-8950-05

## (undated) DEVICE — DRSG JUMPSTART ANTIMICROBIAL 1.5X8" ABS-4005

## (undated) DEVICE — PACK MAJOR EXTREMITY SOP15MEFCA

## (undated) DEVICE — GLOVE BIOGEL PI INDICATOR 8.0 LF 41680

## (undated) DEVICE — TOURNIQUET SGL BLADDER 18"X4" RED 5921-218-135

## (undated) DEVICE — GLOVE PROTEXIS PI ORTHO PF 8.5 2D73HT76

## (undated) DEVICE — GUIDEWIRE THRD TROCAR TIP .045" W/LASER LINE AR-8737-05

## (undated) DEVICE — SUTURE 3-0 PROLENE FS-1 631G

## (undated) DEVICE — PREP CHLORAPREP 26ML TINTED ORANGE  260815

## (undated) DEVICE — BLADE KNIFE SURG 15 371115

## (undated) DEVICE — COVER LIGHT HANDLE LT-F02

## (undated) DEVICE — SU PROLENE 3-0 FS-1 18" 8684G

## (undated) DEVICE — DRAPE EXTREMITY W/ARMBOARD 29405

## (undated) DEVICE — BNDG ELASTIC 4"X5YDS UNSTERILE 6611-40

## (undated) DEVICE — ENDO SNARE POLYPECTOMY OVAL 10MM LOOP SD-240U-10

## (undated) DEVICE — BLADE SAW SAGITTAL STRK MICRO 9.0X25X0.38MM 2296-003-511

## (undated) DEVICE — ENDO KIT COMPLIANCE DYKENDOCMPLY

## (undated) DEVICE — GEL ULTRASOUND AQUASONIC 20GM 01-01

## (undated) DEVICE — DRILL BIT ARTHREX MTP 2.0MM AR-8944-22

## (undated) RX ORDER — LIDOCAINE HYDROCHLORIDE 20 MG/ML
SOLUTION OROPHARYNGEAL
Status: DISPENSED
Start: 2020-03-09

## (undated) RX ORDER — LIDOCAINE HYDROCHLORIDE 10 MG/ML
INJECTION, SOLUTION INFILTRATION; PERINEURAL
Status: DISPENSED
Start: 2020-11-05

## (undated) RX ORDER — PROPOFOL 10 MG/ML
INJECTION, EMULSION INTRAVENOUS
Status: DISPENSED
Start: 2020-12-18

## (undated) RX ORDER — FENTANYL CITRATE 50 UG/ML
INJECTION, SOLUTION INTRAMUSCULAR; INTRAVENOUS
Status: DISPENSED
Start: 2020-12-18

## (undated) RX ORDER — ONDANSETRON 2 MG/ML
INJECTION INTRAMUSCULAR; INTRAVENOUS
Status: DISPENSED
Start: 2020-12-18

## (undated) RX ORDER — OXYCODONE HYDROCHLORIDE 5 MG/1
TABLET ORAL
Status: DISPENSED
Start: 2020-12-18

## (undated) RX ORDER — LIDOCAINE HYDROCHLORIDE 20 MG/ML
INJECTION, SOLUTION EPIDURAL; INFILTRATION; INTRACAUDAL; PERINEURAL
Status: DISPENSED
Start: 2020-12-18

## (undated) RX ORDER — PROPOFOL 10 MG/ML
INJECTION, EMULSION INTRAVENOUS
Status: DISPENSED
Start: 2020-12-15

## (undated) RX ORDER — SODIUM CHLORIDE 9 MG/ML
INJECTION, SOLUTION INTRAVENOUS
Status: DISPENSED
Start: 2020-03-09

## (undated) RX ORDER — CEFAZOLIN SODIUM 2 G/100ML
INJECTION, SOLUTION INTRAVENOUS
Status: DISPENSED
Start: 2020-12-18

## (undated) RX ORDER — ALUMINA, MAGNESIA, AND SIMETHICONE 2400; 2400; 240 MG/30ML; MG/30ML; MG/30ML
SUSPENSION ORAL
Status: DISPENSED
Start: 2020-03-09

## (undated) RX ORDER — DEXAMETHASONE SODIUM PHOSPHATE 4 MG/ML
INJECTION, SOLUTION INTRA-ARTICULAR; INTRALESIONAL; INTRAMUSCULAR; INTRAVENOUS; SOFT TISSUE
Status: DISPENSED
Start: 2020-12-18

## (undated) RX ORDER — CLONIDINE HYDROCHLORIDE 0.1 MG/1
TABLET ORAL
Status: DISPENSED
Start: 2020-03-09

## (undated) RX ORDER — LIDOCAINE HYDROCHLORIDE 10 MG/ML
INJECTION, SOLUTION EPIDURAL; INFILTRATION; INTRACAUDAL; PERINEURAL
Status: DISPENSED
Start: 2020-12-15

## (undated) RX ORDER — LIDOCAINE HYDROCHLORIDE 10 MG/ML
INJECTION, SOLUTION INFILTRATION; PERINEURAL
Status: DISPENSED
Start: 2020-12-18

## (undated) RX ORDER — TRIAMCINOLONE ACETONIDE 40 MG/ML
INJECTION, SUSPENSION INTRA-ARTICULAR; INTRAMUSCULAR
Status: DISPENSED
Start: 2020-11-05

## (undated) RX ORDER — BUPIVACAINE HYDROCHLORIDE 5 MG/ML
INJECTION, SOLUTION EPIDURAL; INTRACAUDAL
Status: DISPENSED
Start: 2020-12-18